# Patient Record
Sex: FEMALE | Race: WHITE | NOT HISPANIC OR LATINO | Employment: OTHER | URBAN - METROPOLITAN AREA
[De-identification: names, ages, dates, MRNs, and addresses within clinical notes are randomized per-mention and may not be internally consistent; named-entity substitution may affect disease eponyms.]

---

## 2018-10-22 ENCOUNTER — EVALUATION (OUTPATIENT)
Dept: PHYSICAL THERAPY | Facility: CLINIC | Age: 73
End: 2018-10-22
Payer: MEDICARE

## 2018-10-22 DIAGNOSIS — R26.9 GAIT ABNORMALITY: Primary | ICD-10-CM

## 2018-10-22 PROCEDURE — 97163 PT EVAL HIGH COMPLEX 45 MIN: CPT | Performed by: PHYSICAL THERAPIST

## 2018-10-22 PROCEDURE — G8979 MOBILITY GOAL STATUS: HCPCS | Performed by: PHYSICAL THERAPIST

## 2018-10-22 PROCEDURE — G8978 MOBILITY CURRENT STATUS: HCPCS | Performed by: PHYSICAL THERAPIST

## 2018-10-22 NOTE — LETTER
2018    Danica Hardy MD  [address] [place] [address]    Patient: Holly Hernandez   YOB: 1945   Date of Visit: 10/22/2018     Encounter Diagnosis     ICD-10-CM    1  Gait abnormality R26 9        Dear Dr Foy Body:    Please review the attached Plan of Care from [facility] recent visit  Please verify that you agree therapy should continue by signing the attached document and sending it back to our office  If you have any questions or concerns, please don't hesitate to call  Sincerely,    Celeste Saenz, PT      Referring Provider:      I certify that I have read the below Plan of Care and certify the need for these services furnished under this plan of treatment while under my care  Danica Hardy MD  [address] [place] [address]: [phone]          PT Evaluation     Today's date: 10/22/2018  Patient name: Holly Hernandez  :   MRN: [id number]  Referring provider: Jemal Duque MD  Dx:   Encounter Diagnosis     ICD-10-CM    1  Gait abnormality R26 9        Start Time: 1400  Stop Time: 1500   Total time in clinic (min): 60 minutes    Assessment  Impairments: abnormal gait, activity intolerance, impaired balance, impaired physical strength and lacks appropriate home exercise program    Assessment details: Pt is 76 y/o female presenting to skilled PT with gait and balance impairments  Pt reports having ongoing issues with gait and balance since C-spine surgery in   Pt has been to inpatient rehab as well as outpatient PT over the past few years and reports having "little hope" that things will improve but wants to give it another try  Pt presents with proximal hip weakness, impaired balance, and decreased endurance  Pt able to perform mCTSIB positions except for FTEC on foam (4 seconds) indicating impairments in vestibular component of balance  Pt scored below age norms for outcome measures   Based on TUG score (20 sec) and gait speed (0 59 m/s) pt is at increased risk for falls  Based on 5x STS (18 sec) and 6 MWT (560 ft) pt scores below age norms and indicates overall decreased endurance  Pt scored 36/56 on Tadeo indicating impaired balance and considered moderate fall risk  Based on above findings pt will benefit from skilled PT to improve strength and balance, reduce fall risk, and maximize function  (Pt to bring in list of complete PMH and medications upon next visit)  Understanding of Dx/Px/POC: good   Prognosis: good    Goals  Goals  STG 30 days    1  Patient will improve static balance with feet together eyes closed on foam surface to 30 seconds indicating reduction in fall risk  2  Patient will achieve 190 feet improvement with overall distance achieved of 750 feet with 6 minute walk test which is Minimal Detectable Change pre current research standards with endurance to demonstrate enhance functional capacity   3  Patient will display 2 3  second improvement with overall score of 17 seconds  with TUG test or lower with noted improvement being Minimal Detectable Change pre current research standards with fall risk     4  Patient will achieve 42/56 TADEO score with minimal improve by 6 points or more demonstrating Minimal Detectable change per current research standards for this objective test which assess fall risk  5  Patient will achieve   59 ft/sec improvement with score of  ft/sec with 10 Meter Walk test, demonstrating Minimal Detectable change per current research standards for this objective test which assess fall risk  6  Patient will perform  5 x sit to stand test to 15 seconds score indicating improvement with functional endurance      LT days   1  Patient will score low risk for falls with 3/4 fall risk measures  2  Patient will be able to ambulate 940 feet with [facility] during 6 minute walk test    3  Patient will be able to ambulate outdoors with [facility] without any loss of balance      Cut off score All date taken from [employer] Neuro Section or Rehab Measures    TADEO test: 46/56                                              5 x STS Test:  MDC: 6 points                                                  MDC: 2 3 seconds   age norms                                                                 Age Norms   61-76 year old = M: 54, F: 55                        62-78 year old: 11 4 seconds   66-77 year old = M 47,  F: 50                       71-76 year old: 12 6 seconds    80-80 year old = M46,   F: 53                       80-80 year old: 14 8 seconds     TUG test:                                                                     10 Meter Walk Test:  MDC: 4 14 seconds       MDC:  59 ft/sec  Cut off score for Falls                                                  Age Norms  > 13 5 seconds community dwelling adults                20-29; M: 4 56 ft/sec F: 4 62 ft/sec  > 32 2 Frail Elderly                                                     30-39: M 4 76 ft/sec  F: 4 68 ft/sec          40-49: M: 4 79 ft/sec  F: 4 62 ft/sec  6 Minute Walk Test      50-59: M: 4 76 ft/sec  F: 4 56 ft/sec  MDC: 190 feet       60-69: M: 4 56 ft/sec  F: 4 26 ft/sec  Age Norms       70-+    M: 4 36 ft/sec  F: 4 16 ft/sec  60-69:    M: 1876 F: 1765  70-79:    M: 1729 F: 1545  80-89 +: M: 0544 F; 1286     Plan  Patient would benefit from: PT eval  Planned modality interventions: biofeedback, thermotherapy: hydrocollator packs and cryotherapy  Planned therapy interventions: activity modification, balance, balance/weight bearing training, community reintegration, flexibility, functional ROM exercises, gait training, graded activity, graded exercise, home exercise program, graded motor, transfer training, therapeutic training, therapeutic exercise, therapeutic activities, stretching, strengthening, postural training, patient education, neuromuscular re-education and manual therapy  Frequency: 2x week  Duration in weeks: 12  Plan of Care beginning date: 10/22/2018  Plan of Care expiration date: 2019  Treatment plan discussed with: patient        Subjective Evaluation    History of Present Illness  Mechanism of injury: In  pt had cervical surgery (C2-C7), [facility] and continued with outpatient PT services  Pt had TKR's a few years ago; got C diff in the hospital and went to rehab after that also  Pt was using SPC but started using RW about 6 months ago  Pt's last fall was 2018 but she is fearful of falling and does not feel confident with her gait and balance  Pt presents to PT to improve her gait     Quality of life: good    Pain  Current pain ratin  At best pain ratin  At worst pain ratin    Social Support  Steps to enter house: yes (1)  Stairs in house: yes (FF stairs)   Lives in: multiple-level home  Lives with: spouse    Hand dominance: ambidextrous    Treatments  Previous treatment: physical therapy  Patient Goals  Patient goals for therapy: increased strength, independence with ADLs/IADLs, return to sport/leisure activities and improved balance  Patient goal: be able to improve her walking and walk with SPC         Objective     Strength/Myotome Testing     Left Hip   Planes of Motion   Flexion: 3+  Abduction: 3+  Adduction: 3+    Right Hip   Planes of Motion   Flexion: 3+  Abduction: 3+  Adduction: 3+    Left Knee   Flexion: 4-  Extension: 4-    Right Knee   Flexion: 4-  Extension: 4-    Left Ankle/Foot   Dorsiflexion: 4-  Plantar flexion: 4-    Right Ankle/Foot   Dorsiflexion: 4-  Plantar flexion: 4-    Ambulation     Comments   TUG= 20 sec with RW  Gait speed= 10m / 17 sec = 0 59 m/sec  6 MWT= 560 ft with RW      Functional Assessment     Comments  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  Coordination intact- finger to nose, heel to shin     5x STS= 18 seconds    Contreras= 36/56     Feet Together Eyes Open= 30 seconds  Feet Together Eyes Closed= 30 seconds  Feet Together Eyes Open (Foam)= 30 seconds  Feet Together Eyes Closed (Foam)= 4 seconds    PT/OT Neuro Exam  Neurologic Exam     Gait, Coordination, and Reflexes TUG= 20 sec with RW  Gait speed= 10m / 17 sec = 0 59 m/sec  6 MWT= 560 ft with RW         Precautions: falls    Daily Treatment Diary     Manual                                                                                   Exercise Diary                                                                                                                                                                                                                                                                                      Modalities                                                               Flowsheet Rows      Most Recent Value   PT/OT G-Codes   Current Score  50   Projected Score  66   FOTO information reviewed  Yes   Assessment Type  Evaluation   G code set  Mobility: Walking & Moving Around   Mobility: Walking and Moving Around Current Status ()  CK   Mobility: Walking and Moving Around Goal Status ()  CI

## 2018-10-22 NOTE — PROGRESS NOTES
PT Evaluation     Today's date: 10/22/2018  Patient name: Magui Avila  :   MRN: [id number]  Referring provider: Sanjay Toro MD  Dx:   Encounter Diagnosis     ICD-10-CM    1  Gait abnormality R26 9        Start Time: 1400  Stop Time: 1500   Total time in clinic (min): 60 minutes    Assessment  Impairments: abnormal gait, activity intolerance, impaired balance, impaired physical strength and lacks appropriate home exercise program    Assessment details: Pt is 74 y/o female presenting to skilled PT with gait and balance impairments  Pt reports having ongoing issues with gait and balance since C-spine surgery in   Pt has been to inpatient rehab as well as outpatient PT over the past few years and reports having "little hope" that things will improve but wants to give it another try  Pt presents with proximal hip weakness, impaired balance, and decreased endurance  Pt able to perform mCTSIB positions except for FTEC on foam (4 seconds) indicating impairments in vestibular component of balance  Pt scored below age norms for outcome measures  Based on TUG score (20 sec) and gait speed (0 59 m/s) pt is at increased risk for falls  Based on 5x STS (18 sec) and 6 MWT (560 ft) pt scores below age norms and indicates overall decreased endurance  Pt scored 36/56 on Contreras indicating impaired balance and considered moderate fall risk  Based on above findings pt will benefit from skilled PT to improve strength and balance, reduce fall risk, and maximize function  (Pt to bring in list of complete PMH and medications upon next visit)  Understanding of Dx/Px/POC: good   Prognosis: good    Goals  Goals  STG 30 days    1  Patient will improve static balance with feet together eyes closed on foam surface to 30 seconds indicating reduction in fall risk     2  Patient will achieve 190 feet improvement with overall distance achieved of 750 feet with 6 minute walk test which is Minimal Detectable Change pre current research standards with endurance to demonstrate enhance functional capacity   3  Patient will display 2 3  second improvement with overall score of 17 seconds  with TUG test or lower with noted improvement being Minimal Detectable Change pre current research standards with fall risk     4  Patient will achieve 42/56 TADEO score with minimal improve by 6 points or more demonstrating Minimal Detectable change per current research standards for this objective test which assess fall risk  5  Patient will achieve   59 ft/sec improvement with score of  ft/sec with 10 Meter Walk test, demonstrating Minimal Detectable change per current research standards for this objective test which assess fall risk  6  Patient will perform  5 x sit to stand test to 15 seconds score indicating improvement with functional endurance      LT days   1  Patient will score low risk for falls with 3/4 fall risk measures  2  Patient will be able to ambulate 940 feet with [facility] during 6 minute walk test    3  Patient will be able to ambulate outdoors with [facility] without any loss of balance      Cut off score   All date taken from [employer] Neuro Section or Rehab Measures    TADEO test: 46/56                                              5 x STS Test:  MDC: 6 points                                                  MDC: 2 3 seconds   age norms                                                                 Age Norms   61-76 year old = M: 54, F: 55                        62-78 year old: 11 4 seconds   66-77 year old = M 47,  F: 50                       71-76 year old: 12 6 seconds    80-80 year old = M46,   F: 53                       80-80 year old: 14 8 seconds     TUG test:                                                                     10 Meter Walk Test:  MDC: 4 14 seconds       MDC:  59 ft/sec  Cut off score for Falls                                                  Age Norms  > 13 5 seconds community dwelling adults                20-29; M: 4 56 ft/sec F: 4 62 ft/sec  > 32 2 Frail Elderly                                                     30-39: M 4 76 ft/sec  F: 4 68 ft/sec          40-49: M: 4 79 ft/sec  F: 4 62 ft/sec  6 Minute Walk Test      50-59: M: 4 76 ft/sec  F: 4 56 ft/sec  MDC: 190 feet       60-69: M: 4 56 ft/sec  F: 4 26 ft/sec  Age Norms       70-+    M: 4 36 ft/sec  F: 4 16 ft/sec  60-69:    M: 1876 F: 3623  39-40:    M: 1729 F: 3519  12-28 +: M: 1044 F; 1286     Plan  Patient would benefit from: PT eval  Planned modality interventions: biofeedback, thermotherapy: hydrocollator packs and cryotherapy  Planned therapy interventions: activity modification, balance, balance/weight bearing training, community reintegration, flexibility, functional ROM exercises, gait training, graded activity, graded exercise, home exercise program, graded motor, transfer training, therapeutic training, therapeutic exercise, therapeutic activities, stretching, strengthening, postural training, patient education, neuromuscular re-education and manual therapy  Frequency: 2x week  Duration in weeks: 12  Plan of Care beginning date: 10/22/2018  Plan of Care expiration date: 2019  Treatment plan discussed with: patient        Subjective Evaluation    History of Present Illness  Mechanism of injury: In  pt had cervical surgery (C2-C7), went to [facility] for rehabilitation for 3 weeks and continued with outpatient PT services  Pt had TKR's a few years ago; got C diff in the hospital and went to rehab after that also  Pt was using SPC but started using RW about 6 months ago  Pt's last fall was 2018 but she is fearful of falling and does not feel confident with her gait and balance  Pt presents to PT to improve her gait     Quality of life: good    Pain  Current pain ratin  At best pain ratin  At worst pain ratin    Social Support  Steps to enter house: yes (1)  Stairs in house: yes (FF stairs)   Lives in: multiple-level home  Lives with: spouse    Hand dominance: ambidextrous    Treatments  Previous treatment: physical therapy  Patient Goals  Patient goals for therapy: increased strength, independence with ADLs/IADLs, return to sport/leisure activities and improved balance  Patient goal: be able to improve her walking and walk with [facility]         Objective     Strength/Myotome Testing     Left Hip   Planes of Motion   Flexion: 3+  Abduction: 3+  Adduction: 3+    Right Hip   Planes of Motion   Flexion: 3+  Abduction: 3+  Adduction: 3+    Left Knee   Flexion: 4-  Extension: 4-    Right Knee   Flexion: 4-  Extension: 4-    Left Ankle/Foot   Dorsiflexion: 4-  Plantar flexion: 4-    Right Ankle/Foot   Dorsiflexion: 4-  Plantar flexion: 4-    Ambulation     Comments   TUG= 20 sec with RW  Gait speed= 10m / 17 sec = 0 59 m/sec  6 MWT= 560 ft with RW      Functional Assessment     Comments  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  Coordination intact- finger to nose, heel to shin     5x STS= 18 seconds    Contreras= 36/56     Feet Together Eyes Open= 30 seconds  Feet Together Eyes Closed= 30 seconds  Feet Together Eyes Open (Foam)= 30 seconds  Feet Together Eyes Closed (Foam)= 4 seconds    PT/OT Neuro Exam  Neurologic Exam     Gait, Coordination, and Reflexes TUG= 20 sec with RW  Gait speed= 10m / 17 sec = 0 59 m/sec  6 MWT= 560 ft with RW         Precautions: falls    Daily Treatment Diary     Manual                                                                                   Exercise Diary                                                                                                                                                                                                                                                                                      Modalities                                                               Flowsheet Rows      Most Recent Value   PT/OT G-Codes   Current Score  50   Projected Score  66   FOTO information reviewed  Yes   Assessment Type  Evaluation   G code set  Mobility: Walking & Moving Around   Mobility: Walking and Moving Around Current Status ()  CK   Mobility: Walking and Moving Around Goal Status ()  CI

## 2018-10-23 ENCOUNTER — TRANSCRIBE ORDERS (OUTPATIENT)
Dept: PHYSICAL THERAPY | Facility: CLINIC | Age: 73
End: 2018-10-23

## 2018-10-23 DIAGNOSIS — R26.9 GAIT ABNORMALITY: Primary | ICD-10-CM

## 2018-10-29 ENCOUNTER — OFFICE VISIT (OUTPATIENT)
Dept: PHYSICAL THERAPY | Facility: CLINIC | Age: 73
End: 2018-10-29
Payer: MEDICARE

## 2018-10-29 DIAGNOSIS — R26.9 GAIT ABNORMALITY: Primary | ICD-10-CM

## 2018-10-29 PROCEDURE — 97112 NEUROMUSCULAR REEDUCATION: CPT

## 2018-10-29 PROCEDURE — 97530 THERAPEUTIC ACTIVITIES: CPT

## 2018-10-29 PROCEDURE — 97110 THERAPEUTIC EXERCISES: CPT

## 2018-10-29 NOTE — PROGRESS NOTES
Daily Note     Today's date: 10/29/2018  Patient name: Tricia Raines  :   MRN: [id number]  Referring provider: Barrett Motta MD  Dx:   Encounter Diagnosis     ICD-10-CM    1  Gait abnormality R26 9        Start Time:   Stop Time:   Total time in clinic (min): 45 minutes    Subjective: Patient reported that she is "feeling good today", stated that she had a 7/10 knee pain today  Presented with hyperextension stability brace today  Objective: See treatment diary below:  Precautions: Fall Risk, No past medical history on file  Daily Treatment Diary     Manual                                                                                   Exercise Diary  10/29            Mini-Squats with Knee flexion hold 20 reps, 3 seconds asecdning and 3 seconds descending            Side Stepping with mini-squat 2 UE, 10 cycles             Hip Flexion with mini-Squat 20 reps, 3 seconds holds            Hip Extension with mini-squat 20 reps, 3 seconds holds            Hip Abduction 20 reps, 3 seconds holds            Seated Adduction ball squeezes 20 reps, 3 second holds            Hurdles- Forwards, Laterally, Backwards 2 UE holds, 3 consecutive hurdles, 10 cycles each direction            Sit to stands No UE, 2 airex pads on seat, 20 reps, ball between knees            FTEO on Foam, no UE 30 seconds, 2 sets                                                                                                                                                               Modalities                                                         Assessment: Patient tolerated session well today, able to initiate her POC well today  Patient noted difficulties with maintaining her ability to keep her L knee from hyper-extending today during treatment, patient asked to engage quads today during activities to improve her quadriceps strength and reduce hyper-extension   Patient shows decreased L LE weight shift today, indicated with R foot underneath seat due to decreased L LE strength  Patient improving with LE strength during session with 2 airex pads on seat  Patient educated on brace management to prevent hyper-extension at home, told to not wear brace during sleep  Patient requires skilled PT to improve fall risk and maximize function  Plan: Continue per plan of care  Progress treatment as tolerated

## 2018-10-31 ENCOUNTER — OFFICE VISIT (OUTPATIENT)
Dept: PHYSICAL THERAPY | Facility: CLINIC | Age: 73
End: 2018-10-31
Payer: MEDICARE

## 2018-10-31 DIAGNOSIS — R26.9 GAIT ABNORMALITY: Primary | ICD-10-CM

## 2018-10-31 PROCEDURE — 97110 THERAPEUTIC EXERCISES: CPT

## 2018-10-31 NOTE — PROGRESS NOTES
Daily Note     Today's date: 10/31/2018  Patient name: Magui Avila  :   MRN: [id number]  Referring provider: Sanjay Toro MD  Dx:   Encounter Diagnosis     ICD-10-CM    1  Gait abnormality R26 9        Start Time:   Stop Time:   Total time in clinic (min): 46 minutes    Subjective: Patient reported that she is "feeling good today", stated that she had a 7/10 knee pain today  Presented with hyperextension stability brace today  Treating therapist suggested speaking to a prosthetist for a brace to prevent her knee hyperextension  Objective: See treatment diary below:  Precautions: Fall Risk, No past medical history on file        Daily Treatment Diary     Manual                                                                                   Exercise Diary  10/29 10/31           Mini-Squats with Knee flexion hold 20 reps, 3 seconds asecdning and 3 seconds descending 20 reps, 3 seconds asecdning and 3 seconds descending           Side Stepping with mini-squat 2 UE, 10 cycles  2 UE, 10 cycles            Hip Flexion with mini-Squat 20 reps, 3 seconds holds 20 reps, 3 seconds holds           Hip Extension with mini-squat 20 reps, 3 seconds holds 20 reps, 3 seconds holds           Hip Abduction 20 reps, 3 seconds holds 20 reps, 3 seconds holds           Seated Adduction ball squeezes 20 reps, 3 second holds 25 reps, 3 second holds           Hurdles- Forwards, Laterally, Backwards 2 UE holds, 3 consecutive hurdles, 10 cycles each direction 2 UE holds, 3 consecutive hurdles, 10 cycles each direction           Sit to stands No UE, 2 airex pads on seat, 20 reps, ball between knees No UE, 2 airex pads on seat, 20 reps, ball between knees           FTEO on Foam, no UE 30 seconds, 2 sets 30 seconds, 2 sets           Standing Hamstring Curls  20 reps, 3 second holds bilaterally Modalities                                                         Assessment: Patient tolerated session well today, able to progress POC well today  Addition of hamstring curls today to provide joint stability in the posterior region of the L knee joint  Patient improving in awareness with her abilities to improve her squatting to engage her quadriceps bilaterally  Patient improving with hurdles today as well, continues to show circumduction over the hurdles in all directions due to instability as well as weakness in the bilateral knees  Inquired on prosthetist preventing L LE hyper-extension  Patient requires skilled PT to improve fall risk and maximize function  Plan: Continue per plan of care  Progress treatment as tolerated

## 2018-11-05 ENCOUNTER — OFFICE VISIT (OUTPATIENT)
Dept: PHYSICAL THERAPY | Facility: CLINIC | Age: 73
End: 2018-11-05
Payer: MEDICARE

## 2018-11-05 DIAGNOSIS — R26.9 GAIT ABNORMALITY: Primary | ICD-10-CM

## 2018-11-05 PROCEDURE — 97110 THERAPEUTIC EXERCISES: CPT

## 2018-11-05 NOTE — PROGRESS NOTES
Daily Note     Today's date: 2018  Patient name: Rosa Cisneros  :   MRN: [id number]  Referring provider: Torrie Richter MD  Dx:   Encounter Diagnosis     ICD-10-CM    1  Gait abnormality R26 9        Start Time: 110  Stop Time:   Total time in clinic (min): 40 minutes    Subjective: Patient wants to be measured for possibility of knee brace to prevent her knees from extending too much  Objective: See treatment diary below    Precautions: Fall Risk, No past medical history on file        Daily Treatment Diary     Manual                                                                                   Exercise Diary  10/29 10/31 11/5          Mini-Squats with Knee flexion hold 20 reps, 3 seconds asecdning and 3 seconds descending 20 reps, 3 seconds asecdning and 3 seconds descending 20 reps, 3 seconds asecdning and 3 seconds descending          Side Stepping with mini-squat 2 UE, 10 cycles  2 UE, 10 cycles  2 UE, 10 cycles          Hip Flexion with mini-Squat 20 reps, 3 seconds holds 20 reps, 3 seconds holds 20 reps, 3 seconds holds          Hip Extension with mini-squat 20 reps, 3 seconds holds 20 reps, 3 seconds holds 20 reps, 3 seconds holds          Hip Abduction 20 reps, 3 seconds holds 20 reps, 3 seconds holds 20 reps, 3 seconds holds          Seated Adduction ball squeezes 20 reps, 3 second holds 25 reps, 3 second holds           Hurdles- Forwards, Laterally, Backwards 2 UE holds, 3 consecutive hurdles, 10 cycles each direction 2 UE holds, 3 consecutive hurdles, 10 cycles each direction 2 UE holds, 3 consecutive hurdles, 10 cycles each direction          Sit to stands No UE, 2 airex pads on seat, 20 reps, ball between knees No UE, 2 airex pads on seat, 20 reps, ball between knees No UE, 2 airex pads on seat, 20 reps, ball between knees          FTEO on Foam, no UE 30 seconds, 2 sets 30 seconds, 2 sets 30 seconds, 2 sets          Standing Hamstring Curls  20 reps, 3 second holds bilaterally                                                                                                                                                  Modalities                                                           Assessment: Tolerated treatment well  She would benefit from being measured for BL knee braces to prevent BL knee hyperextension while standing and with ambulatoin  Patient would benefit from continued PT in order to continue strengthening BL LEs and address balance impairments  Plan: Continue per plan of care

## 2018-11-07 ENCOUNTER — OFFICE VISIT (OUTPATIENT)
Dept: PHYSICAL THERAPY | Facility: CLINIC | Age: 73
End: 2018-11-07
Payer: MEDICARE

## 2018-11-07 DIAGNOSIS — R26.9 GAIT ABNORMALITY: Primary | ICD-10-CM

## 2018-11-07 PROCEDURE — 97112 NEUROMUSCULAR REEDUCATION: CPT

## 2018-11-07 PROCEDURE — 97110 THERAPEUTIC EXERCISES: CPT

## 2018-11-07 NOTE — PROGRESS NOTES
Daily Note     Today's date: 2018  Patient name: Albaro Eduardo  :   MRN: [id number]  Referring provider: Kalpana Chavarria MD  Dx:   Encounter Diagnosis     ICD-10-CM    1  Gait abnormality R26 9        Start Time: 1300  Stop Time: 1340  Total time in clinic (min): 40 minutes    Subjective: Patient reports that her knees continue to feel unstable and would like to be measured for braces, which PT has called to schedule an appointment to have her measured for knee braces  Objective: See treatment diary below    Precautions: Fall Risk, No past medical history on file        Daily Treatment Diary     Manual                                                                                   Exercise Diary  10/29 10/31 11/5 11/7         Mini-Squats with Knee flexion hold 20 reps, 3 seconds asecdning and 3 seconds descending 20 reps, 3 seconds asecdning and 3 seconds descending 20 reps, 3 seconds asecdning and 3 seconds descending 20 reps, 3 seconds asecdning and 3 seconds descending         Side Stepping with mini-squat 2 UE, 10 cycles  2 UE, 10 cycles  2 UE, 10 cycles 2 UE, 10 cycles         Hip Flexion with mini-Squat 20 reps, 3 seconds holds 20 reps, 3 seconds holds 20 reps, 3 seconds holds 20 reps, 3 seconds holds  1# ankle weight         Hip Extension with mini-squat 20 reps, 3 seconds holds 20 reps, 3 seconds holds 20 reps, 3 seconds holds 20 reps, 3 seconds holds  1#         Hip Abduction 20 reps, 3 seconds holds 20 reps, 3 seconds holds 20 reps, 3 seconds holds 20 reps, 3 seconds holds  1# ankle weight         Seated Adduction ball squeezes 20 reps, 3 second holds 25 reps, 3 second holds  25 reps, 3 second holds         Hurdles- Forwards, Laterally, Backwards 2 UE holds, 3 consecutive hurdles, 10 cycles each direction 2 UE holds, 3 consecutive hurdles, 10 cycles each direction 2 UE holds, 3 consecutive hurdles, 10 cycles each direction          Sit to stands No UE, 2 airex pads on seat, 20 reps, ball between knees No UE, 2 airex pads on seat, 20 reps, ball between knees No UE, 2 airex pads on seat, 20 reps, ball between knees No UE, 2 airex pads on seat, 20 reps         FTEO on Foam, no UE 30 seconds, 2 sets 30 seconds, 2 sets 30 seconds, 2 sets 30 seconds, 2         Standing Hamstring Curls  20 reps, 3 second holds bilaterally   20 reps, 3 second holds bilaterally   1#         HR's/TR's    1#  2 x 10                                                                                                                                  Modalities                                                           Assessment: Tolerated treatment well   No complaints of knee pain but required frequent PT cueing verbally to prevent BL knee hyperextension through out session  Patient would benefit from continued PT      Plan: Continue per plan of care  Set up time for knee brace fitting

## 2018-11-12 ENCOUNTER — OFFICE VISIT (OUTPATIENT)
Dept: PHYSICAL THERAPY | Facility: CLINIC | Age: 73
End: 2018-11-12
Payer: MEDICARE

## 2018-11-12 DIAGNOSIS — R26.9 GAIT ABNORMALITY: Primary | ICD-10-CM

## 2018-11-12 PROCEDURE — 97112 NEUROMUSCULAR REEDUCATION: CPT

## 2018-11-12 PROCEDURE — 97110 THERAPEUTIC EXERCISES: CPT

## 2018-11-12 NOTE — PROGRESS NOTES
Daily Note     Today's date: 2018  Patient name: eGni Dockery  :   MRN: [id number]  Referring provider: Melanie Boogie MD  Dx:   Encounter Diagnosis     ICD-10-CM    1  Gait abnormality R26 9        Start Time:   Stop Time:   Total time in clinic (min): 45 minutes    Subjective: Patient reports no changes since last visit but would like to move onto more challenging exercises  Objective: See treatment diary below    Precautions: Fall Risk, No past medical history on file        Daily Treatment Diary     Manual                                                                                   Exercise Diary  10/29 10/31 11/5 11/7 11/12        Mini-Squats with Knee flexion hold 20 reps, 3 seconds asecdning and 3 seconds descending 20 reps, 3 seconds asecdning and 3 seconds descending 20 reps, 3 seconds asecdning and 3 seconds descending 20 reps, 3 seconds asecdning and 3 seconds descending 20 reps, 3 seconds asecdning and 3 seconds descending        Side Stepping with mini-squat 2 UE, 10 cycles  2 UE, 10 cycles  2 UE, 10 cycles 2 UE, 10 cycles         Hip Flexion with mini-Squat 20 reps, 3 seconds holds 20 reps, 3 seconds holds 20 reps, 3 seconds holds 20 reps, 3 seconds holds  1# ankle weight         Hip Extension with mini-squat 20 reps, 3 seconds holds 20 reps, 3 seconds holds 20 reps, 3 seconds holds 20 reps, 3 seconds holds  1#         Hip Abduction 20 reps, 3 seconds holds 20 reps, 3 seconds holds 20 reps, 3 seconds holds 20 reps, 3 seconds holds  1# ankle weight         Seated Adduction ball squeezes 20 reps, 3 second holds 25 reps, 3 second holds  25 reps, 3 second holds         Hurdles- Forwards, Laterally, Backwards 2 UE holds, 3 consecutive hurdles, 10 cycles each direction 2 UE holds, 3 consecutive hurdles, 10 cycles each direction 2 UE holds, 3 consecutive hurdles, 10 cycles each direction          Sit to stands No UE, 2 airex pads on seat, 20 reps, ball between knees No UE, 2 airex pads on seat, 20 reps, ball between knees No UE, 2 airex pads on seat, 20 reps, ball between knees No UE, 2 airex pads on seat, 20 reps No UE, 2 airex pads on seat, 20 reps        FTEO on Foam, no UE 30 seconds, 2 sets 30 seconds, 2 sets 30 seconds, 2 sets 30 seconds, 2         Standing Hamstring Curls  20 reps, 3 second holds bilaterally   20 reps, 3 second holds bilaterally   1#         HR's/TR's    1#  2 x 10         BOSU Lunges     2 x 10    3" hold BL UE support        Toe Taps      2 x 10   8" block        Heel Toe Walking     6 cycles forward and backward         Step Ups             Tandem Stances     3 x 30" Firm BL UE tactile cues                                                                Modalities                                                         Assessment: Tolerated treatment well  She tolerated exercise progressions well she was able to complete all reps and sets with minimal signs of fatigue until end of sets  Required frequent PT verbal cues to prevent knee hyperextension BL during exercises  Patient would benefit from continued PT in order to strengthen BL LEs and prevent knee hyperextension with exercises  Plan: Continue per plan of care

## 2018-11-15 ENCOUNTER — OFFICE VISIT (OUTPATIENT)
Dept: PHYSICAL THERAPY | Facility: CLINIC | Age: 73
End: 2018-11-15
Payer: MEDICARE

## 2018-11-15 DIAGNOSIS — R26.9 GAIT ABNORMALITY: Primary | ICD-10-CM

## 2018-11-15 PROCEDURE — 97112 NEUROMUSCULAR REEDUCATION: CPT | Performed by: PHYSICAL THERAPIST

## 2018-11-15 PROCEDURE — 97110 THERAPEUTIC EXERCISES: CPT | Performed by: PHYSICAL THERAPIST

## 2018-11-15 NOTE — PROGRESS NOTES
Daily Note     Today's date: 11/15/2018  Patient name: Denisse Lima  :   MRN: [id number]  Referring provider: Edison Frederick MD  Dx:   Encounter Diagnosis     ICD-10-CM    1  Gait abnormality R26 9        Start Time: 1200  Stop Time:   Total time in clinic (min): 48 minutes    Subjective: Pt reports L knee pain due to excessive hyperextension; orthotist coming  to assess for swedish knee cage brace  Reports 7/10 knee pain with standing exercises WBing through LLE (medial/posterior knee pain); 0/10 when sitting down resting  Objective: See treatment diary below    Precautions: Fall Risk, No past medical history on file        Daily Treatment Diary     Manual                                                                                   Exercise Diary  10/29 10/31 11/5 11/7 11/12 11/15       Mini-Squats with Knee flexion hold 20 reps, 3 seconds asecdning and 3 seconds descending 20 reps, 3 seconds asecdning and 3 seconds descending 20 reps, 3 seconds asecdning and 3 seconds descending 20 reps, 3 seconds asecdning and 3 seconds descending 20 reps, 3 seconds asecdning and 3 seconds descending 20 reps, 3 seconds asecdning and 3 seconds descending       Side Stepping with mini-squat 2 UE, 10 cycles  2 UE, 10 cycles  2 UE, 10 cycles 2 UE, 10 cycles  Side-stepping with green TB       Hip Flexion with mini-Squat 20 reps, 3 seconds holds 20 reps, 3 seconds holds 20 reps, 3 seconds holds 20 reps, 3 seconds holds  1# ankle weight         Hip Extension with mini-squat 20 reps, 3 seconds holds 20 reps, 3 seconds holds 20 reps, 3 seconds holds 20 reps, 3 seconds holds  1#         Hip Abduction 20 reps, 3 seconds holds 20 reps, 3 seconds holds 20 reps, 3 seconds holds 20 reps, 3 seconds holds  1# ankle weight  20 reps, 3 seconds holds  2# ankle weight       Seated Adduction ball squeezes 20 reps, 3 second holds 25 reps, 3 second holds  25 reps, 3 second holds         Hurdles- Forwards, Laterally, Backwards 2 UE holds, 3 consecutive hurdles, 10 cycles each direction 2 UE holds, 3 consecutive hurdles, 10 cycles each direction 2 UE holds, 3 consecutive hurdles, 10 cycles each direction          Sit to stands No UE, 2 airex pads on seat, 20 reps, ball between knees No UE, 2 airex pads on seat, 20 reps, ball between knees No UE, 2 airex pads on seat, 20 reps, ball between knees No UE, 2 airex pads on seat, 20 reps No UE, 2 airex pads on seat, 20 reps No UE, 2 airex pads on seat, 20 reps       FTEO on Foam, no UE 30 seconds, 2 sets 30 seconds, 2 sets 30 seconds, 2 sets 30 seconds, 2         Standing Hamstring Curls  20 reps, 3 second holds bilaterally   20 reps, 3 second holds bilaterally   1#  20 reps     2# ankle weights       HR's/TR's    1#  2 x 10         BOSU Lunges     2 x 10    3" hold BL UE support 2 x 10    3" hold BL UE support       Toe Taps      2 x 10   8" block        Heel Toe Walking     6 cycles forward and backward         Step Ups      6" step  BUE's  20 reps       Tandem Stances     3 x 30" Firm BL UE tactile cues        TKE's      20 reps  Green TB       SLR      2# ankle weights    2x10                                     Modalities                                                       11/15/18 given HEP for SLR and seated hip abd with TB    Assessment:   Pt tolerated treatment session well  Pt continues to present with L knee hyperextension and dec L knee stability  Pt c/o medial L knee pain and presents with increased valgus  Focused on hip abduction strengthening exercises as well as maintaining L knee in neutral position during standing TE with cues from therapist  Pt given HEP today  Patient would benefit from continued PT in order to strengthen BLE's, improve balance, and maximize function  Plan: Continue per plan of care

## 2018-11-19 ENCOUNTER — OFFICE VISIT (OUTPATIENT)
Dept: PHYSICAL THERAPY | Facility: CLINIC | Age: 73
End: 2018-11-19
Payer: MEDICARE

## 2018-11-19 DIAGNOSIS — R26.9 GAIT ABNORMALITY: Primary | ICD-10-CM

## 2018-11-19 PROCEDURE — 97110 THERAPEUTIC EXERCISES: CPT | Performed by: PHYSICAL THERAPIST

## 2018-11-19 PROCEDURE — 97112 NEUROMUSCULAR REEDUCATION: CPT | Performed by: PHYSICAL THERAPIST

## 2018-11-19 NOTE — PROGRESS NOTES
Daily Note     Today's date: 2018  Patient name: Rosa Cisneros  :   MRN: [id number]  Referring provider: Torrie Richter MD  Dx:   Encounter Diagnosis     ICD-10-CM    1  Gait abnormality R26 9        Start Time: 1300  Stop Time: 8371  Total time in clinic (min): 45 minutes    Subjective: Pt reports she did a lot of cleaning over the weekend and had increased R knee pain afterwards  However reports today it "doesn't feel too bad " Mario from [address] [place] [address] is coming Wednesday to assess for swedish knee cage brace  Objective: See treatment diary below    Precautions: Fall Risk, No past medical history on file        Daily Treatment Diary     Manual                                                                                   Exercise Diary  10/29 10/31 11/5 11/7 11/12 11/15 11/19      Mini-Squats with Knee flexion hold 20 reps, 3 seconds asecdning and 3 seconds descending 20 reps, 3 seconds asecdning and 3 seconds descending 20 reps, 3 seconds asecdning and 3 seconds descending 20 reps, 3 seconds asecdning and 3 seconds descending 20 reps, 3 seconds asecdning and 3 seconds descending 20 reps, 3 seconds asecdning and 3 seconds descending 20 reps, 3 seconds asecdning and 3 seconds descending     With green TB       Side Stepping with mini-squat 2 UE, 10 cycles  2 UE, 10 cycles  2 UE, 10 cycles 2 UE, 10 cycles  Side-stepping with green TB Side-steping with green TB      Hip Flexion with mini-Squat 20 reps, 3 seconds holds 20 reps, 3 seconds holds 20 reps, 3 seconds holds 20 reps, 3 seconds holds  1# ankle weight         Hip Extension with mini-squat 20 reps, 3 seconds holds 20 reps, 3 seconds holds 20 reps, 3 seconds holds 20 reps, 3 seconds holds  1#         Hip Abduction 20 reps, 3 seconds holds 20 reps, 3 seconds holds 20 reps, 3 seconds holds 20 reps, 3 seconds holds  1# ankle weight  20 reps, 3 seconds holds  2# ankle weight 20 reps, 3 second holds  2# ankle weight      Seated Adduction ball squeezes 20 reps, 3 second holds 25 reps, 3 second holds  25 reps, 3 second holds         Hurdles- Forwards, Laterally, Backwards 2 UE holds, 3 consecutive hurdles, 10 cycles each direction 2 UE holds, 3 consecutive hurdles, 10 cycles each direction 2 UE holds, 3 consecutive hurdles, 10 cycles each direction          Sit to stands No UE, 2 airex pads on seat, 20 reps, ball between knees No UE, 2 airex pads on seat, 20 reps, ball between knees No UE, 2 airex pads on seat, 20 reps, ball between knees No UE, 2 airex pads on seat, 20 reps No UE, 2 airex pads on seat, 20 reps No UE, 2 airex pads on seat, 20 reps No UE, 2 airex pads on seat, 20 reps    Green TB       FTEO on Foam, no UE 30 seconds, 2 sets 30 seconds, 2 sets 30 seconds, 2 sets 30 seconds, 2         Standing Hamstring Curls  20 reps, 3 second holds bilaterally   20 reps, 3 second holds bilaterally   1#  20 reps     2# ankle weights 20 reps     2# ankle weights      HR's/TR's    1#  2 x 10         BOSU Lunges     2 x 10    3" hold BL UE support 2 x 10    3" hold BL UE support 2 x 10    3" hold  BUE support    therapist maintaining L knee in neutral      Toe Taps      2 x 10   8" block        Heel Toe Walking     6 cycles forward and backward         Step Ups      6" step  BUE's  20 reps       Tandem Stances     3 x 30" Firm BL UE tactile cues        TKE's      20 reps  Green TB 20 reps  GreenTB      SLR      2# ankle weights    2x10 2# ankle weights    2x10                                    Modalities                                                       11/15/18 given HEP for SLR and seated hip abd with TB    Assessment:   Pt tolerated treatment session well  Pt continues to present with L knee hyperextension and dec L knee stability  Added green TB around thighs for mini squats and sit>stands for further hip abduction strengthening  Provided tc's to keep L knee in neutral during any SLS and pt reported increased comfort, decreased pain   Patient would benefit from continued PT in order to strengthen BLE's, improve balance, and maximize function  Plan: Continue per plan of care

## 2018-11-21 ENCOUNTER — OFFICE VISIT (OUTPATIENT)
Dept: PHYSICAL THERAPY | Facility: CLINIC | Age: 73
End: 2018-11-21
Payer: MEDICARE

## 2018-11-21 DIAGNOSIS — R26.9 GAIT ABNORMALITY: Primary | ICD-10-CM

## 2018-11-21 PROCEDURE — G8979 MOBILITY GOAL STATUS: HCPCS

## 2018-11-21 PROCEDURE — 97112 NEUROMUSCULAR REEDUCATION: CPT

## 2018-11-21 PROCEDURE — G8978 MOBILITY CURRENT STATUS: HCPCS

## 2018-11-21 NOTE — PROGRESS NOTES
PT Re-Evaluation     Today's date: 2018  Patient name: Maryellen Prader  : 2097  MRN: [id number]  Referring provider: Dede Santos MD  Dx:   Encounter Diagnosis     ICD-10-CM    1  Gait abnormality R26 9        Start Time: 1300  Stop Time: 4104   Total time in clinic (min): 45 minutes    Assessment  Assessment details: Pt is 76 y/o female presenting to skilled PT with gait and balance impairments  Pt reports having ongoing issues with gait and balance since C-spine surgery in   Pt has been to inpatient rehab as well as outpatient PT over the past few years and reports having "little hope" that things will improve but wants to give it another try  Pt presents with proximal hip weakness, impaired balance, and decreased endurance  Pt able to perform mCTSIB positions except for FTEC on foam (4 seconds) indicating impairments in vestibular component of balance  Pt scored below age norms for outcome measures  Based on TUG score (20 sec) and gait speed (0 59 m/s) pt is at increased risk for falls  Based on 5x STS (18 sec) and 6 MWT (560 ft) pt scores below age norms and indicates overall decreased endurance  Pt scored 36/56 on Contreras indicating impaired balance and considered moderate fall risk  Based on above findings pt will benefit from skilled PT to improve strength and balance, reduce fall risk, and maximize function  18  Since beginning PT patient continues to have L knee pain which she feels limits her balancing abilities  Improvements made with strength testing, but scores still indicating significant weakness present in BL LEs  Improvements made in 5x STS indicating slight reduction in falls risk  Improvement in 6 MW test by 140 ft indicating improvements in strength and endurance  No changes made with TUG time and only 2 second improvement with FTEC on foam, indicating she is still at high falls risk   Patient would benefit from skilled PT in order to reduce L knee pain, improve BL LE strength, improve gait and balance abnormalities, and have patient fitted for knee braces so she can ambulate and stand safely at home and in the community, complete ADLs and household chores requiring standing and walking and to exercise at the gym to maintain her health safely and without risk of falls  (Pt to bring in list of complete PMH and medications upon next visit)    11/21/18  Pt is 76 y/o female presenting to skilled PT with gait and balance impairments  Pt reports having ongoing issues with gait and balance since C-spine surgery in 2011  Pt has been to inpatient rehab as well as outpatient PT over the past few years and reports having "little hope" that things will improve but wants to give it another try  Pt presents with proximal hip weakness, impaired balance, and decreased endurance  Pt able to perform mCTSIB positions except for FTEC on foam (4 seconds) indicating impairments in vestibular component of balance  Pt scored below age norms for outcome measures  Based on TUG score (20 sec) and gait speed (0 59 m/s) pt is at increased risk for falls  Based on 5x STS (18 sec) and 6 MWT (560 ft) pt scores below age norms and indicates overall decreased endurance  Pt scored 36/56 on Contreras indicating impaired balance and considered moderate fall risk  Based on above findings pt will benefit from skilled PT to improve strength and balance, reduce fall risk, and maximize function  Impairments: abnormal gait, activity intolerance, impaired balance, impaired physical strength and lacks appropriate home exercise program  Understanding of Dx/Px/POC: good   Prognosis: good    Goals  Goals  STG 30 days    1  Patient will improve static balance with feet together eyes closed on foam surface to 30 seconds indicating reduction in fall risk  - NOT MET  2   Patient will achieve 190 feet improvement with overall distance achieved of 750 feet with 6 minute walk test which is Minimal Detectable Change pre current research standards with endurance to demonstrate enhance functional capacity -NOT MET  3  Patient will display 2 3  second improvement with overall score of 17 seconds  with TUG test or lower with noted improvement being Minimal Detectable Change pre current research standards with fall risk  -NOT MET    4  Patient will achieve 42/56 TADEO score with minimal improve by 6 points or more demonstrating Minimal Detectable change per current research standards for this objective test which assess fall risk  -NOT MET   5  Patient will achieve   59 ft/sec improvement with score of  ft/sec with 10 Meter Walk test, demonstrating Minimal Detectable change per current research standards for this objective test which assess fall risk  -NOT MET  6  Patient will perform  5 x sit to stand test to 15 seconds score indicating improvement with functional endurance- NOT MET      LT days   1  Patient will score low risk for falls with 3/4 fall risk measures  -NOT MET  2  Patient will be able to ambulate 940 feet with [facility] during 6 minute walk test  -NOT MET   3  Patient will be able to ambulate outdoors with [facility] without any loss of balance  -NOT MET    Cut off score   All date taken from [facility] Neuro Section or Rehab Measures    TADEO test: 46/56                                              5 x STS Test:  MDC: 6 points                                                  MDC: 2 3 seconds   age norms                                                                 Age Norms   61-76 year old = M: 54, F: 55                        62-78 year old: 11 4 seconds   66-77 year old = M 47,  F: 50                       71-76 year old: 12 6 seconds    80-80 year old = M46,   F: 48                       80-80 year old: 14 8 seconds     TUG test:                                                                     10 Meter Walk Test:  MDC: 4 14 seconds       MDC:  59 ft/sec  Cut off score for Falls                                                  Age Norms  > 13 5 seconds community dwelling adults                20-29; M: 4 56 ft/sec F: 4 62 ft/sec  > 32 2 [facility]                                                     30-39: M 4 76 ft/sec  F: 4 68 ft/sec          40-49: M: 4 79 ft/sec  F: 4 62 ft/sec  6 Minute Walk Test      50-59: M: 4 76 ft/sec  F: 4 56 ft/sec  MDC: 190 feet       60-69: M: 4 56 ft/sec  F: 4 26 ft/sec  Age Norms       70-+    M: 4 36 ft/sec  F: 4 16 ft/sec  60-69:    M: 1876 F: 3497  37-40:    M: 1729 F: 0013  85-31 +: M: 3854 F; 1286     Plan  Patient would benefit from: PT eval  Planned modality interventions: biofeedback, thermotherapy: hydrocollator packs and cryotherapy  Planned therapy interventions: activity modification, balance, balance/weight bearing training, community reintegration, flexibility, functional ROM exercises, gait training, graded activity, graded exercise, home exercise program, graded motor, transfer training, therapeutic training, therapeutic exercise, therapeutic activities, stretching, strengthening, postural training, patient education, neuromuscular re-education and manual therapy  Frequency: 2x week  Duration in weeks: 12  Plan of Care beginning date: 10/22/2018  Plan of Care expiration date: 1/14/2019  Treatment plan discussed with: patient        Subjective Evaluation    History of Present Illness  Mechanism of injury: In 2011 pt had cervical surgery (C2-C7), went to [facility] for rehabilitation for 3 weeks and continued with outpatient PT services  Pt had TKR's a few years ago; got C diff in the hospital and went to rehab after that also  Pt was using SPC but started using RW about 6 months ago  Pt's last fall was January 2018 but she is fearful of falling and does not feel confident with her gait and balance  Pt presents to PT to improve her gait  11/21/18  Patient reports that she feels she has only improved by 10% and has not made much improvements   Feels she is limited due to knee pain on the L when trying to keep knee bent and while doing exercises or activities requiring standing or walking     Quality of life: good    Pain  Current pain ratin  At best pain ratin  At worst pain ratin  Location: L knee on the inside of the knee  Quality: sharp    Social Support  Steps to enter house: yes (1)  Stairs in house: yes (FF stairs)   Lives in: multiple-level home  Lives with: spouse    Hand dominance: ambidextrous    Treatments  Previous treatment: physical therapy  Patient Goals  Patient goals for therapy: increased strength, independence with ADLs/IADLs, return to sport/leisure activities and improved balance  Patient goal: be able to improve her walking and walk with SPC         Objective     Strength/Myotome Testing     Left Hip   Planes of Motion   Flexion: 4-  Abduction: 3+  Adduction: 4-    Right Hip   Planes of Motion   Flexion: 4-  Abduction: 3+  Adduction: 4-    Left Knee   Flexion: 4  Extension: 4    Right Knee   Flexion: 4  Extension: 4    Left Ankle/Foot   Dorsiflexion: 4  Plantar flexion: 4-    Right Ankle/Foot   Dorsiflexion: 4  Plantar flexion: 4-    Ambulation     Comments   TUG= 20 sec with RW  Gait speed= 10m / 17 sec = 0 59 m/sec  6 MWT= 560 ft with RW      18  TUG= 20 55 sec with RW  Gait speed= 0 62 m/sec  6 MWT= 700 ft with RW          Functional Assessment     Comments  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  Coordination intact- finger to nose, heel to shin     5x STS= 18 seconds    Contreras= 39/56     Feet Together Eyes Open= 30 seconds  Feet Together Eyes Closed= 30 seconds    18  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  Coordination intact- finger to nose, heel to shin     5x STS= 16 seconds  30 sec STS: 9 times  Contreras= 36/56     Feet Together Eyes Open= 30 seconds  Feet Together Eyes Closed= 30 seconds  Feet Together Eyes Open (Foam)= 30 seconds  Feet Together Eyes Closed (Foam)=6 seconds      PT/OT Neuro Exam  Neurologic Exam     Gait, Coordination, and Reflexes TUG= 20 sec with RW  Gait speed= 10m / 17 sec = 0 59 m/sec  6 MWT= 560 ft with RW      11/21/18  TUG= 20 55 sec with RW  Gait speed= 0 62 m/sec  6 MWT= 700 ft with RW             Precautions: falls    Daily Treatment Diary     Manual                                                                                   Exercise Diary  11/21            Neuro Testing 40 min                                                                                                                                                                                                                                                                        Modalities                                                               Flowsheet Rows      Most Recent Value   PT/OT G-Codes   Current Score  45   Projected Score  66   Assessment Type  Re-evaluation   G code set  Mobility: Walking & Moving Around   Mobility: Walking and Moving Around Current Status ()  CK   Mobility: Walking and Moving Around Goal Status ()  CJ

## 2018-11-26 ENCOUNTER — OFFICE VISIT (OUTPATIENT)
Dept: PHYSICAL THERAPY | Facility: CLINIC | Age: 73
End: 2018-11-26
Payer: MEDICARE

## 2018-11-26 DIAGNOSIS — R26.9 GAIT ABNORMALITY: Primary | ICD-10-CM

## 2018-11-26 PROCEDURE — 97112 NEUROMUSCULAR REEDUCATION: CPT

## 2018-11-26 PROCEDURE — 97110 THERAPEUTIC EXERCISES: CPT

## 2018-11-26 NOTE — PROGRESS NOTES
Daily Note     Today's date: 2018  Patient name: Jeanne Miguel  :   MRN: [id number]  Referring provider: Shyam Don MD  Dx:   Encounter Diagnosis     ICD-10-CM    1  Gait abnormality R26 9        Start Time: 1300  Stop Time: 0659  Total time in clinic (min): 45 minutes    Subjective: Patient reports that her knee pain BL has improved since last visit  She states that having PT support for her L knee to prevent hyperextension helps reduce her pain  Patient reports that she wants to begin using a 3 wheel rollator and states she already has a 4 wheel rollator at home  But she feels it will be easier to move around in stores and at home with a 3 wheel rollator  Objective: See treatment diary below    Precautions: Fall Risk, No past medical history on file        Daily Treatment Diary     Manual                                                                                   Exercise Diary  10/29 10/31 11/5 11/7 11/12 11/15 11/19 11/26/18     Mini-Squats with Knee flexion hold 20 reps, 3 seconds asecdning and 3 seconds descending 20 reps, 3 seconds asecdning and 3 seconds descending 20 reps, 3 seconds asecdning and 3 seconds descending 20 reps, 3 seconds asecdning and 3 seconds descending 20 reps, 3 seconds asecdning and 3 seconds descending 20 reps, 3 seconds asecdning and 3 seconds descending 20 reps, 3 seconds asecdning and 3 seconds descending     With green TB  20 reps, 3 seconds asecdning and 3 seconds descending     With green TB      Side Stepping with mini-squat 2 UE, 10 cycles  2 UE, 10 cycles  2 UE, 10 cycles 2 UE, 10 cycles  Side-stepping with green TB Side-steping with green TB Side-steping      Hip Flexion with mini-Squat 20 reps, 3 seconds holds 20 reps, 3 seconds holds 20 reps, 3 seconds holds 20 reps, 3 seconds holds  1# ankle weight    20 reps, 3 seconds holds  2# ankle weigh     Hip Extension with mini-squat 20 reps, 3 seconds holds 20 reps, 3 seconds holds 20 reps, 3 seconds holds 20 reps, 3 seconds holds  1#         Hip Abduction 20 reps, 3 seconds holds 20 reps, 3 seconds holds 20 reps, 3 seconds holds 20 reps, 3 seconds holds  1# ankle weight  20 reps, 3 seconds holds  2# ankle weight 20 reps, 3 second holds  2# ankle weight      Seated Adduction ball squeezes 20 reps, 3 second holds 25 reps, 3 second holds  25 reps, 3 second holds         Hurdles- Forwards, Laterally, Backwards 2 UE holds, 3 consecutive hurdles, 10 cycles each direction 2 UE holds, 3 consecutive hurdles, 10 cycles each direction 2 UE holds, 3 consecutive hurdles, 10 cycles each direction          Sit to stands No UE, 2 airex pads on seat, 20 reps, ball between knees No UE, 2 airex pads on seat, 20 reps, ball between knees No UE, 2 airex pads on seat, 20 reps, ball between knees No UE, 2 airex pads on seat, 20 reps No UE, 2 airex pads on seat, 20 reps No UE, 2 airex pads on seat, 20 reps No UE, 2 airex pads on seat, 20 reps    Green TB       FTEO on Foam, no UE 30 seconds, 2 sets 30 seconds, 2 sets 30 seconds, 2 sets 30 seconds, 2         Standing Hamstring Curls  20 reps, 3 second holds bilaterally   20 reps, 3 second holds bilaterally   1#  20 reps     2# ankle weights 20 reps     2# ankle weights      HR's/TR's    1#  2 x 10         BOSU Lunges     2 x 10    3" hold BL UE support 2 x 10    3" hold BL UE support 2 x 10    3" hold  BUE support    therapist maintaining L knee in neutral 2 x 10    3" hold  BUE support    therapist maintaining L knee in neutral     Toe Taps      2 x 10   8" block        Heel Toe Walking     6 cycles forward and backward         Step Ups      6" step  BUE's  20 reps  laterall 9"  2 x 10 3" hold each   PT tactile cue for hyperextension on the L      Tandem Stances     3 x 30" Firm BL UE tactile cues        TKE's      20 reps  Green TB 20 reps  GreenTB      SLR      2# ankle weights    2x10 2# ankle weights    2x10      Rollator ambulation        6'                      Modalities Assessment: Tolerated treatment well  Unable to complete side stepping with GTB due to medial L knee pain which reduced after removing TB  Continued to complete mini squats, lunges,  and side stepping with PT tactile cues to prevent hyperextension of L knee  Began training with rollator, as she wants to learn how to with a 3 wheel rollator for home and in the community  Plan to have patient fitted for brace next visit  Plan: Continue per plan of care

## 2018-11-29 ENCOUNTER — APPOINTMENT (OUTPATIENT)
Dept: PHYSICAL THERAPY | Facility: CLINIC | Age: 73
End: 2018-11-29
Payer: MEDICARE

## 2018-12-03 NOTE — PROGRESS NOTES
Self-discharge    Pt called facility on Friday 11/30 and spoke with PT requesting to cancel all future appts  Pt reports she has decided to get a swedish knee cage brace from her orthopedist instead of through [facility]  Educated pt that she will still benefit from continuing PT once she receives the brace but pt reports she still wishes to cancel appts at this time and she will call if she decides to start up again in the future  Pt is considered self-discharge at this time

## 2019-06-17 ENCOUNTER — EVALUATION (OUTPATIENT)
Dept: PHYSICAL THERAPY | Facility: CLINIC | Age: 74
End: 2019-06-17
Payer: MEDICARE

## 2019-06-17 ENCOUNTER — TRANSCRIBE ORDERS (OUTPATIENT)
Dept: PHYSICAL THERAPY | Facility: CLINIC | Age: 74
End: 2019-06-17

## 2019-06-17 DIAGNOSIS — Z96.652 STATUS POST TOTAL LEFT KNEE REPLACEMENT: Primary | ICD-10-CM

## 2019-06-17 PROCEDURE — 97162 PT EVAL MOD COMPLEX 30 MIN: CPT | Performed by: PHYSICAL THERAPIST

## 2019-06-17 RX ORDER — LOPERAMIDE HYDROCHLORIDE 2 MG/1
2 CAPSULE ORAL DAILY PRN
COMMUNITY
End: 2022-02-02 | Stop reason: SDUPTHER

## 2019-06-17 RX ORDER — DULOXETIN HYDROCHLORIDE 60 MG/1
20 CAPSULE, DELAYED RELEASE ORAL DAILY
COMMUNITY
End: 2022-02-02 | Stop reason: SDUPTHER

## 2019-06-17 RX ORDER — ATORVASTATIN CALCIUM 20 MG/1
20 TABLET, FILM COATED ORAL DAILY
COMMUNITY
End: 2022-02-02 | Stop reason: ALTCHOICE

## 2019-06-17 RX ORDER — TRAMADOL HYDROCHLORIDE 50 MG/1
50 TABLET ORAL EVERY 6 HOURS PRN
COMMUNITY
End: 2022-03-22 | Stop reason: SDUPTHER

## 2019-06-19 ENCOUNTER — OFFICE VISIT (OUTPATIENT)
Dept: PHYSICAL THERAPY | Facility: CLINIC | Age: 74
End: 2019-06-19
Payer: MEDICARE

## 2019-06-19 DIAGNOSIS — Z96.652 STATUS POST TOTAL LEFT KNEE REPLACEMENT: Primary | ICD-10-CM

## 2019-06-19 PROCEDURE — 97140 MANUAL THERAPY 1/> REGIONS: CPT

## 2019-06-19 PROCEDURE — 97112 NEUROMUSCULAR REEDUCATION: CPT

## 2019-06-21 ENCOUNTER — OFFICE VISIT (OUTPATIENT)
Dept: PHYSICAL THERAPY | Facility: CLINIC | Age: 74
End: 2019-06-21
Payer: MEDICARE

## 2019-06-21 DIAGNOSIS — Z96.652 STATUS POST TOTAL LEFT KNEE REPLACEMENT: Primary | ICD-10-CM

## 2019-06-21 PROCEDURE — 97112 NEUROMUSCULAR REEDUCATION: CPT

## 2019-06-21 PROCEDURE — 97140 MANUAL THERAPY 1/> REGIONS: CPT

## 2019-06-24 ENCOUNTER — OFFICE VISIT (OUTPATIENT)
Dept: PHYSICAL THERAPY | Facility: CLINIC | Age: 74
End: 2019-06-24
Payer: MEDICARE

## 2019-06-24 DIAGNOSIS — Z96.652 STATUS POST TOTAL LEFT KNEE REPLACEMENT: Primary | ICD-10-CM

## 2019-06-24 PROCEDURE — 97112 NEUROMUSCULAR REEDUCATION: CPT

## 2019-06-24 PROCEDURE — 97140 MANUAL THERAPY 1/> REGIONS: CPT

## 2019-06-26 ENCOUNTER — OFFICE VISIT (OUTPATIENT)
Dept: PHYSICAL THERAPY | Facility: CLINIC | Age: 74
End: 2019-06-26
Payer: MEDICARE

## 2019-06-26 DIAGNOSIS — Z96.652 STATUS POST TOTAL LEFT KNEE REPLACEMENT: Primary | ICD-10-CM

## 2019-06-26 PROCEDURE — 97140 MANUAL THERAPY 1/> REGIONS: CPT

## 2019-06-26 PROCEDURE — 97112 NEUROMUSCULAR REEDUCATION: CPT

## 2019-06-28 ENCOUNTER — APPOINTMENT (OUTPATIENT)
Dept: PHYSICAL THERAPY | Facility: CLINIC | Age: 74
End: 2019-06-28
Payer: MEDICARE

## 2019-07-01 ENCOUNTER — OFFICE VISIT (OUTPATIENT)
Dept: PHYSICAL THERAPY | Facility: CLINIC | Age: 74
End: 2019-07-01
Payer: MEDICARE

## 2019-07-01 DIAGNOSIS — Z96.652 STATUS POST TOTAL LEFT KNEE REPLACEMENT: Primary | ICD-10-CM

## 2019-07-01 PROCEDURE — 97140 MANUAL THERAPY 1/> REGIONS: CPT

## 2019-07-01 PROCEDURE — 97112 NEUROMUSCULAR REEDUCATION: CPT

## 2019-07-01 NOTE — PROGRESS NOTES
Daily Note     Today's date: 2019  Patient name: Santiago Leija  :   MRN: [id number]  Referring provider: Magda Oconnor MD  Dx:   Encounter Diagnosis     ICD-10-CM    1  Status post total left knee replacement X64 019                   Subjective: Patient states she has been ambulating with her cane around the house as she is starting to feel more comfortable  She has taken a few steps with out her cane, but is still nervous  She states she was able to do more yesterday, grocery shop, run the sweeper and make dinner as this is more than she has done in one day in a while  Objective: See treatment diary below  Diagnosis: Revision L TKA   Precautions: -   Manuals    PROM - no stretching for ROM KLS   TJH  KLS KLS                                          Exercise Diary            Bike    5 min 5 min     Alter-G 5 min 77% 1 2 mph      5 min 75% 1 0 mph   TM            Gait with  feet CGA   40 feet x 2   feet  feet CGA                 SLR  0# 3x12   0# 2x12 0# 2x12 0# 3x10   SLR abd 0# 2x12   0# 2x10 0# 2x12 0# 2x12    SL ER RTB 2x10   RTB 2x10 RTB 3x10 RTB    Step-ups 6in 2x10 one hand    6 in 2x10 CGA 6 in 2x10 6 in 2x10   Heel raises 3x10   2x10 2x10 2x10   CKC TKE 10# 3x10    10# 2x10     Bridges  :03x20    :30x20 :03x20  :03x20    Side stepping GTB 15'x4    RTB 15'x4 RTB 15'x4                                                       Modalities            CP PRN                                              Assessment: Continued with outlined program  Increased gait speed on Alter-G with cues for proper gait sequence  She is progressing well with gait, just remains fearful and requires encouragement  Patient demonstrates hyperextension with gait using SPC, difficulty initiating stride, but able to maintain  She was able to perform step ups with use of one UE  Plan: Progress treatment as tolerated

## 2019-07-03 ENCOUNTER — OFFICE VISIT (OUTPATIENT)
Dept: PHYSICAL THERAPY | Facility: CLINIC | Age: 74
End: 2019-07-03
Payer: MEDICARE

## 2019-07-03 DIAGNOSIS — Z96.652 STATUS POST TOTAL LEFT KNEE REPLACEMENT: Primary | ICD-10-CM

## 2019-07-03 PROCEDURE — 97112 NEUROMUSCULAR REEDUCATION: CPT

## 2019-07-03 PROCEDURE — 97140 MANUAL THERAPY 1/> REGIONS: CPT

## 2019-07-03 NOTE — PROGRESS NOTES
Daily Note     Today's date: 7/3/2019  Patient name: Maurice May  :   MRN: [id number]  Referring provider: Juliette Darden MD  Dx:   Encounter Diagnosis     ICD-10-CM    1  Status post total left knee replacement [id number]                   Subjective:  Patient states she feels pretty good today  She was sore following last visit  Objective: See treatment diary below  Diagnosis: Revision L TKA   Precautions: -   Manuals 7/1 7/3  6/24 6/26   PROM - no stretching for ROM KLS KLS   KLS KLS                                       Exercise Diary           Bike    5 min     Alter-G 5 min 77% 1 2 mph 5 min 90% 1 3 mph     5 min 75% 1 0 mph   TM           Gait with  feet  feet CGA  120 feet  feet CGA                SLR  0# 3x12 0# 3x12  0# 2x12 0# 3x10   SLR abd 0# 2x12 0# 2x12  0# 2x12 0# 2x12    SL ER RTB 2x10 GTB 2x10  RTB 3x10 RTB    Step-ups 6in 2x10 one hand  8in one hand 2x10  6 in 2x10 6 in 2x10   Heel raises 3x10 3x10  2x10 2x10   CKC TKE 10# 3x10 11# 3x10  10# 2x10     Bridges  :03x20  :53G20  :03x20  :03x20    Side stepping GTB 15'x4 GTB 15'x4  RTB 15'x4 RTB 15'x4                                                   Modalities           CP PRN                                           Assessment: Continued with outlined program  Increased gait speed on Alter-G with cues for proper gait sequence  She was able to increase distance with SPC, hyperextension on RLE limiting stability  She was able to increase step height with step ups, using one hand (occasionally two hands)  Plan: Progress treatment as tolerated

## 2019-07-05 ENCOUNTER — APPOINTMENT (OUTPATIENT)
Dept: PHYSICAL THERAPY | Facility: CLINIC | Age: 74
End: 2019-07-05
Payer: MEDICARE

## 2019-07-08 ENCOUNTER — OFFICE VISIT (OUTPATIENT)
Dept: PHYSICAL THERAPY | Facility: CLINIC | Age: 74
End: 2019-07-08
Payer: MEDICARE

## 2019-07-08 DIAGNOSIS — Z96.652 STATUS POST TOTAL LEFT KNEE REPLACEMENT: Primary | ICD-10-CM

## 2019-07-08 PROCEDURE — 97140 MANUAL THERAPY 1/> REGIONS: CPT

## 2019-07-08 PROCEDURE — 97116 GAIT TRAINING THERAPY: CPT

## 2019-07-08 PROCEDURE — 97112 NEUROMUSCULAR REEDUCATION: CPT

## 2019-07-08 NOTE — PROGRESS NOTES
Daily Note     Today's date: 2019  Patient name: Maurice May  :   MRN: [id number]  Referring provider: Juliette Darden MD  Dx:   Encounter Diagnosis     ICD-10-CM    1  Status post total left knee replacement [id number] [id number]                   Subjective:  Patient states she feels a little sore today following a lot of walking yesterday  She states she was walking with her RW  Objective: See treatment diary below  Diagnosis: Revision L TKA   Precautions: -   Manuals 7/1 7/3 7/8  6/26   PROM - no stretching for ROM KLS KLS KLS  KLS                                    Exercise Diary          Bike         Alter-G 5 min 77% 1 2 mph 5 min 90% 1 3 mph  5 min 91% 1 3 mph   5 min 75% 1 0 mph   TM          Gait with  feet  feet  feet CGA   120 feet CGA               SLR  0# 3x12 0# 3x12 0# 3x12  0# 3x10   SLR abd 0# 2x12 0# 2x12 0# 2x12   0# 2x12    SL ER RTB 2x10 GTB 2x10 GTB 2x10   RTB    Step-ups 6in 2x10 one hand  8in one hand 2x10 8in one hand 2x10  6 in 2x10   Heel raises 3x10 3x10 3x10  2x10   CKC TKE 10# 3x10 11# 3x10 11# 3x10       Bridges  :03x20  :03x20 :03x30   :03x20    Side stepping GTB 15'x4 GTB 15'x4 GTB 15'x4   RTB 15'x4                                               Modalities           CP PRN                                           Assessment: Continued with outlined program  Patient demonstrates improved gait pattern while on Alter-G, cued for proper sequence  She remains unstable with SPC gait, CGA  LE strength slowly improving  Plan: Progress treatment as tolerated

## 2019-07-10 ENCOUNTER — OFFICE VISIT (OUTPATIENT)
Dept: PHYSICAL THERAPY | Facility: CLINIC | Age: 74
End: 2019-07-10
Payer: MEDICARE

## 2019-07-10 DIAGNOSIS — Z96.652 STATUS POST TOTAL LEFT KNEE REPLACEMENT: Primary | ICD-10-CM

## 2019-07-10 PROCEDURE — 97112 NEUROMUSCULAR REEDUCATION: CPT

## 2019-07-10 PROCEDURE — 97140 MANUAL THERAPY 1/> REGIONS: CPT

## 2019-07-10 PROCEDURE — 97116 GAIT TRAINING THERAPY: CPT

## 2019-07-10 NOTE — PROGRESS NOTES
Daily Note     Today's date: 7/10/2019  Patient name: Amna Henley  : [birth year]  MRN: [id number]  Referring provider: Roseann Miranda MD  Dx:   Encounter Diagnosis     ICD-10-CM    1  Status post total left knee replacement [id number]                   Subjective: Patient states she was weeding her garden with a lot of up and down motion and has soreness in her quad muscles  Objective: See treatment diary below  Diagnosis: Revision L TKA   Precautions: -   Manuals 7/1 7/3 7/8 7/10    PROM - no stretching for ROM KLS KLS KLS KLS                                           Exercise Diary            Bike            Alter-G 5 min 77% 1 2 mph 5 min 90% 1 3 mph  5 min 91% 1 3 mph  5 min 92% 1 3 mph for gait    TM            Gait with  feet  feet  feet CGA  200 ft CGA                 SLR  0# 3x12 0# 3x12 0# 3x12 1# 3x12    SLR abd 0# 2x12 0# 2x12 0# 2x12  0# 2x10    SL ER RTB 2x10 GTB 2x10 GTB 2x10  GTB 2x10    Step-ups 6in 2x10 one hand  8in one hand 2x10 8in one hand 2x10 8in one hand 3x10    Heel raises 3x10 3x10 3x10 3x10    CKC TKE 10# 3x10 11# 3x10 11# 3x10  12# 3x10    Bridges  :03x20  :03x20 :03x30  :03x30    Side stepping GTB 15'x4 GTB 15'x4 GTB 15'x4  GTB 15'x4    Sit to stands       One hand 15x                                           Modalities            CP PRN                                          Assessment: Continued with outlined program  Trialed sit to stands; able to perform with one UE with moderate muscle fatigue  Cued for proper technique with sidelying abduction  Plan: Progress treatment as tolerated

## 2019-07-12 ENCOUNTER — APPOINTMENT (OUTPATIENT)
Dept: PHYSICAL THERAPY | Facility: CLINIC | Age: 74
End: 2019-07-12
Payer: MEDICARE

## 2019-07-15 ENCOUNTER — EVALUATION (OUTPATIENT)
Dept: PHYSICAL THERAPY | Facility: CLINIC | Age: 74
End: 2019-07-15
Payer: MEDICARE

## 2019-07-15 ENCOUNTER — TRANSCRIBE ORDERS (OUTPATIENT)
Dept: PHYSICAL THERAPY | Facility: CLINIC | Age: 74
End: 2019-07-15

## 2019-07-15 DIAGNOSIS — Z96.652 STATUS POST TOTAL LEFT KNEE REPLACEMENT: Primary | ICD-10-CM

## 2019-07-15 PROCEDURE — 97140 MANUAL THERAPY 1/> REGIONS: CPT | Performed by: PHYSICAL THERAPIST

## 2019-07-15 PROCEDURE — 97110 THERAPEUTIC EXERCISES: CPT | Performed by: PHYSICAL THERAPIST

## 2019-07-15 PROCEDURE — 97112 NEUROMUSCULAR REEDUCATION: CPT | Performed by: PHYSICAL THERAPIST

## 2019-07-15 NOTE — PROGRESS NOTES
PT Re-evaluation    Today's date: 7/15/2019  Patient name: France Starr  :   MRN: [id number]  Referring provider: Leo Delaney MD  Dx:   Encounter Diagnosis     ICD-10-CM    1  Status post total left knee replacement Z96 652                   Assessment  Assessment details: Patient is a 75 y o  female who presents with the above listed impairments  Patient would continue to benefit from skilled PT services to address these impairments and to maximize function  Thank you for the referral     Impairments: abnormal coordination, abnormal gait, abnormal muscle firing, abnormal movement, activity intolerance, impaired balance, impaired physical strength and pain with function  Understanding of Dx/Px/POC: good   Prognosis: good    Goals  Impairment Goals  - Decrease pain by 50% in 4 weeks  - progressing  - Increase left lower extremity strength [employer]antrotter to 4+/5 in 6 weeks  - progressing    Functional Goals  - Will be able to ambulate without assistive device in 6 weeks  - progressing  - will be able to swing a golf club in 8 weeks  - progressing  - Patient will be independent with HEP in 8 weeks  - progressing    Plan  Patient would benefit from: skilled PT  Planned modality interventions: cryotherapy  Planned therapy interventions: joint mobilization, manual therapy, neuromuscular re-education, patient education, strengthening, stretching, therapeutic activities, therapeutic exercise, home exercise program, functional ROM exercises, Talavera taping and postural training  Frequency: 3x week (2-3x week)  Duration in weeks: 6  Treatment plan discussed with: patient, PTA and referring physician        Subjective Evaluation    History of Present Illness  Mechanism of injury: Pt notes a 70% improvement to date  She notes an overall improvement with gait, pain, ROM, and strength  She does however state she feels very uncomfortable with walking with a SPC    She states she is using a RW outside of PT   Pain  Current pain ratin  At best pain ratin  At worst pain ratin  Location: L knee    Patient Goals  Patient goal: play golf and walk without SPC        Objective     Tenderness     Additional Tenderness Details  Medial and lateral knee grossly on the L  Active Range of Motion   Left Knee   Flexion: 120 degrees   Extension: 0 degrees     Strength/Myotome Testing     Additional Strength Details  4/5 strength noted grossly LLE  A quad lag noted with SLR  Tests     Additional Tests Details        Swelling     Left Knee Girth Measurement (cm)   10 cm above joint line: 38 cm    Right Knee Girth Measurement (cm)   10 cm above joint line: 35 cm    General Comments:      Knee Comments  Neurovascular intact  Gait was antalgic in nature  Pt does have a hx of c/s surgery with possible LE residual deficits as per patient  This may affect gait and balance during her progression of PT  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 and does affect her progression of gait  Possible use ot functional knee brace discussed and to be considered  Will discuss further with referring physician        Flowsheet Rows      Most Recent Value   PT/OT G-Codes   Current Score  55   Projected Score  49   FOTO information reviewed  Yes             Diagnosis: Revision L TKA   Precautions: -   Manuals 7/1 7/3 7/8 7/10 7/15   PROM - no stretching for ROM KLS KLS KLS KLS CMF                              Re-eval         CMF   Exercise Diary            Bike            Alter-G 5 min 77% 1 2 mph 5 min 90% 1 3 mph  5 min 91% 1 3 mph  5 min 92% 1 3 mph for gait 5 min 92% 1 3 mph for gait   TM            Gait with  feet  feet  feet CGA  200 ft  ft CGA                SLR  0# 3x12 0# 3x12 0# 3x12 1# 3x12 1# 3x12   SLR abd 0# 2x12 0# 2x12 0# 2x12  0# 2x10 0# 2x10   SL ER RTB 2x10 GTB 2x10 GTB 2x10  GTB 2x10 GTB 2x10   Step-ups 6in 2x10 one hand  8in one hand 2x10 8in one hand 2x10 8in one hand 3x10 8'' 3x10 Heel raises 3x10 3x10 3x10 3x10 3x10   CKC TKE 10# 3x10 11# 3x10 11# 3x10  12# 3x10 12# 3x10   Bridges  :03x20  :03x20 :03x30  :03x30 :03 x30   Side stepping GTB 15'x4 GTB 15'x4 GTB 15'x4  GTB 15'x4 GTB 15'x4   Sit to stands       One hand 15x                                           Modalities            CP PRN

## 2019-07-15 NOTE — LETTER
July 15, 2019    Farhad Leon MD  [address] [place], [place] [address] [address] [address]    Patient: Rafael Kan   YOB: 1945   Date of Visit: 7/15/2019     Encounter Diagnosis     ICD-10-CM    1  Status post total left knee replacement [id number] 652        Dear Dr Julianna Olivier:    Please review the attached Plan of Care from [facility] recent visit  Please verify that you agree therapy should continue by signing the attached document and sending it back to our office  If you have any questions or concerns, please don't hesitate to call  Sincerely,    Luisa Post, PT      Referring Provider:      I certify that I have read the below Plan of Care and certify the need for these services furnished under this plan of treatment while under my care  Farhad Leon MD  [address] [place], [place] [address] [address] [place] [address] [place]: [phone]          PT Re-evaluation    Today's date: 7/15/2019  Patient name: Rafael Kan  :   MRN: [id number]  Referring provider: Army Oppenheim , MD  Dx:   Encounter Diagnosis     ICD-10-CM    1  Status post total left knee replacement Z96 652                   Assessment  Assessment details: Patient is a 75 y o  female who presents with the above listed impairments  Patient would continue to benefit from skilled PT services to address these impairments and to maximize function  Thank you for the referral     Impairments: abnormal coordination, abnormal gait, abnormal muscle firing, abnormal movement, activity intolerance, impaired balance, impaired physical strength and pain with function  Understanding of Dx/Px/POC: good   Prognosis: good    Goals  Impairment Goals  - Decrease pain by 50% in 4 weeks  - progressing  - Increase left lower extremity strength [employer] to 4+/5 in 6 weeks  - progressing    Functional Goals  - Will be able to ambulate without assistive device in 6 weeks   - progressing  - will be able to swing a golf club in 8 weeks  - progressing  - Patient will be independent with HEP in 8 weeks  - progressing    Plan  Patient would benefit from: skilled PT  Planned modality interventions: cryotherapy  Planned therapy interventions: joint mobilization, manual therapy, neuromuscular re-education, patient education, strengthening, stretching, therapeutic activities, therapeutic exercise, home exercise program, functional ROM exercises, Talavera taping and postural training  Frequency: 3x week (2-3x week)  Duration in weeks: 6  Treatment plan discussed with: patient, PTA and referring physician        Subjective Evaluation    History of Present Illness  Mechanism of injury: Pt notes a 70% improvement to date  She notes an overall improvement with gait, pain, ROM, and strength  She does however state she feels very uncomfortable with walking with a SPC  She states she is using a RW outside of PT  Pain  Current pain ratin  At best pain ratin  At worst pain ratin  Location: L knee    Patient Goals  Patient goal: play golf and walk without SPC        Objective     Tenderness     Additional Tenderness Details  Medial and lateral knee grossly on the L  Active Range of Motion   Left Knee   Flexion: 120 degrees   Extension: 0 degrees     Strength/Myotome Testing     Additional Strength Details  4/5 strength noted grossly LLE  A quad lag noted with SLR  Tests     Additional Tests Details        Swelling     Left Knee Girth Measurement (cm)   10 cm above joint line: 38 cm    Right Knee Girth Measurement (cm)   10 cm above joint line: 35 cm    General Comments:      Knee Comments  Neurovascular intact  Gait was antalgic in nature  Pt does have a hx of c/s surgery with possible LE residual deficits as per patient  This may affect gait and balance during her progression of PT  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 and does affect her progression of gait    Possible use ot functional knee brace discussed and to be considered  Will discuss further with referring physician        Flowsheet Rows      Most Recent Value   PT/OT G-Codes   Current Score  55   Projected Score  49   FOTO information reviewed  Yes             Diagnosis: Revision L TKA   Precautions: -   Manuals 7/1 7/3 7/8 7/10 7/15   PROM - no stretching for ROM KLS KLS KLS KLS CMF                              Re-eval         CMF   Exercise Diary            Bike            Alter-G 5 min 77% 1 2 mph 5 min 90% 1 3 mph  5 min 91% 1 3 mph  5 min 92% 1 3 mph for gait 5 min 92% 1 3 mph for gait   TM            Gait with  feet  feet  feet CGA  200 ft  ft CGA                SLR  0# 3x12 0# 3x12 0# 3x12 1# 3x12 1# 3x12   SLR abd 0# 2x12 0# 2x12 0# 2x12  0# 2x10 0# 2x10   SL ER RTB 2x10 GTB 2x10 GTB 2x10  GTB 2x10 GTB 2x10   Step-ups 6in 2x10 one hand  8in one hand 2x10 8in one hand 2x10 8in one hand 3x10 8'' 3x10   Heel raises 3x10 3x10 3x10 3x10 3x10   CKC TKE 10# 3x10 11# 3x10 11# 3x10  12# 3x10 12# 3x10   Bridges  :03x20  :03x20 :03x30  :03x30 :03 x30   Side stepping GTB 15'x4 GTB 15'x4 GTB 15'x4  GTB 15'x4 GTB 15'x4   Sit to stands       One hand 15x                                           Modalities            CP PRN

## 2019-07-17 ENCOUNTER — OFFICE VISIT (OUTPATIENT)
Dept: PHYSICAL THERAPY | Facility: CLINIC | Age: 74
End: 2019-07-17
Payer: MEDICARE

## 2019-07-17 DIAGNOSIS — Z96.652 STATUS POST TOTAL LEFT KNEE REPLACEMENT: Primary | ICD-10-CM

## 2019-07-17 PROCEDURE — 97112 NEUROMUSCULAR REEDUCATION: CPT

## 2019-07-17 PROCEDURE — 97110 THERAPEUTIC EXERCISES: CPT

## 2019-07-17 NOTE — PROGRESS NOTES
Daily Note     Today's date: 2019  Patient name: Darek Healy  : [id number]  MRN: [id number]  Referring provider: Eleanor Babb MD  Dx:   Encounter Diagnosis     ICD-10-CM    1  Status post total left knee replacement [id number] [id number]                   Subjective: Patient states she continues to notice an improvement in her strength  She remains challenged with balance and stability while ambulating  Objective: See treatment diary below      Assessment: Continued with outlined program  Trialed balance and proprioception; requires UE support intermittently due to decrease stability  She was able to progress with overall strengthening with moderate muscle fatigue  Plan: Progress treatment as tolerated         Diagnosis: Revision L TKA   Precautions: -   Manuals 7/17  7/8 7/10 7/15   PROM - no stretching for ROM KLS  KLS KLS CMF                            Re-eval        CMF   Exercise Diary           Bike           Alter-G 5 min 94% 1 4 mph  5 min 91% 1 3 mph  5 min 92% 1 3 mph for gait 5 min 92% 1 3 mph for gait   TM           Gait with  feet CGA  200 feet CGA  200 ft  ft CGA               SLR  1# 3x12  0# 3x12 1# 3x12 1# 3x12   SLR abd 0# 2x10   0# 2x12  0# 2x10 0# 2x10   SL ER GTB 3x10  GTB 2x10  GTB 2x10 GTB 2x10   Step-ups 8in 3x10   8in one hand 2x10 8in one hand 3x10 8'' 3x10   Heel raises 3x10   3x10 3x10 3x10   CKC TKE   11# 3x10  12# 3x10 12# 3x10   Bridges  :03x30   :03x30  :03x30 :03 x30   Side stepping GTB 15'x4  GTB 15'x4  GTB 15'x4 GTB 15'x4   Sit to stands One hard 10x each hand    One hand 15x    FT EO :30x2         Tandem stance  :30x2                     Modalities           CP PRN

## 2019-07-19 ENCOUNTER — APPOINTMENT (OUTPATIENT)
Dept: PHYSICAL THERAPY | Facility: CLINIC | Age: 74
End: 2019-07-19
Payer: MEDICARE

## 2019-07-22 ENCOUNTER — OFFICE VISIT (OUTPATIENT)
Dept: PHYSICAL THERAPY | Facility: CLINIC | Age: 74
End: 2019-07-22
Payer: MEDICARE

## 2019-07-22 DIAGNOSIS — Z96.652 STATUS POST TOTAL LEFT KNEE REPLACEMENT: Primary | ICD-10-CM

## 2019-07-22 PROCEDURE — 97112 NEUROMUSCULAR REEDUCATION: CPT

## 2019-07-22 PROCEDURE — 97110 THERAPEUTIC EXERCISES: CPT

## 2019-07-22 NOTE — PROGRESS NOTES
Daily Note     Today's date: 2019  Patient name: Sunitha Graham  :   MRN: [id number]  Referring provider: Carolyn Khan MD  Dx:   Encounter Diagnosis     ICD-10-CM    1  Status post total left knee replacement [id number]                   Subjective: Patient states her "bursa" has been bothering her in her L hip which makes it difficult to walk or stand after sitting in a chair  She reports not attempting the [facility] while at home over the weekend  Objective: See treatment diary below      Assessment: Continued with outlined program  Trialed sit to stands on foam to increase height which patient states is "easier"  Educated the patient on progressions; fatigue following sit to stands but insistent on trialing without foam again which she was able to perform  Held sidelying abduction per patient's request stating her bursa is inflamed when doing that exercise; educated patient on benefits of this exercises  Assess symptoms next visit  Plan: Progress treatment as tolerated         Diagnosis: Revision L TKA   Precautions: -   Manuals 7/17 7/22  7/10 7/15   PROM - no stretching for ROM KLS   KLS CMF                          Re-eval       CMF   Exercise Diary          Bike          Alter-G 5 min 94% 1 4 mph 5 min 94% 1 4 mph for gait   5 min 92% 1 3 mph for gait 5 min 92% 1 3 mph for gait   TM          Gait with  feet  feet CGA  200 ft  ft CGA              SLR  1# 3x12 1 5# 2x10   1# 3x12 1# 3x12   SLR abd 0# 2x10  Hold   0# 2x10 0# 2x10   SL ER GTB 3x10 GTB 3x10 each   GTB 2x10 GTB 2x10   Step-ups 8in 3x10  8in 3x10   8in one hand 3x10 8'' 3x10   Heel raises 3x10  --  3x10 3x10   CKC TKE    12# 3x10 12# 3x10   Bridges  :03x30  :03x30   :03x30 :03 x30   Side stepping GTB 15'x4 GTB 15'x4  GTB 15'x4 GTB 15'x4   Sit to stands One hard 10x each hand Sit to stand foam 20x, no foam 4x  One hand 15x    FT EO :30x2 :30x2       Tandem stance  :30x2 :30x2                  Modalities          CP PRN

## 2019-07-24 ENCOUNTER — OFFICE VISIT (OUTPATIENT)
Dept: PHYSICAL THERAPY | Facility: CLINIC | Age: 74
End: 2019-07-24
Payer: MEDICARE

## 2019-07-24 DIAGNOSIS — Z96.652 STATUS POST TOTAL LEFT KNEE REPLACEMENT: Primary | ICD-10-CM

## 2019-07-24 PROCEDURE — 97112 NEUROMUSCULAR REEDUCATION: CPT

## 2019-07-24 PROCEDURE — 97110 THERAPEUTIC EXERCISES: CPT

## 2019-07-24 NOTE — PROGRESS NOTES
Daily Note     Today's date: 2019  Patient name: John Strange  :   MRN: [id number]  Referring provider: Temitope Cheatham MD  Dx:   Encounter Diagnosis     ICD-10-CM    1  Status post total left knee replacement [id number] 001                   Subjective: Patient has no recent complaints of pain  She states she is meeting with the brace rep this afternoon to assist with her knee hyperextension to improve gait requested by PT  She states her L hip feels a little better since not performing (sidelying abduction), but she has been putting on icy hot as well  Objective: See treatment diary below      Assessment: Continued with outlined program  Patient able to perform 15 sit to stands with foam and 10 reps without use of foam  Improved gait with SPC around clinic with CGA  Increased reps within tolerance with moderate muscle fatigue  Plan: Progress treatment as tolerated         Diagnosis: Revision L TKA   Precautions: -   Manuals 7/17 7/22 7/24  7/15   PROM - no stretching for ROM KLS    CMF                        Re-eval      CMF   Exercise Diary         Bike         Alter-G 5 min 94% 1 4 mph 5 min 94% 1 4 mph for gait  5 min 94% 1 4 mph for gait   5 min 92% 1 3 mph for gait   TM         Gait with  feet  feet  feet CGA   200 ft CGA             SLR  1# 3x12 1 5# 2x10  1 5# 3x10  1# 3x12   SLR abd 0# 2x10  Hold  Possible resume nv  0# 2x10   SL ER GTB 3x10 GTB 3x10 each  GTB 3x10   GTB 2x10   Step-ups 8in 3x10  8in 3x10  8in 3x10   8'' 3x10   Heel raises 3x10  --   3x10   CKC TKE     12# 3x10   Bridges  :03x30  :03x30  :03x30   :03 x30   Side stepping GTB 15'x4 GTB 15'x4 GTB 15'x4 at mirror  GTB 15'x4   Sit to stands One hard 10x each hand Sit to stand foam 20x, no foam 4x Sit to stands 15x foam, 10x no foam      FT EO :30x2 :30x2 :30x2 EC      Tandem stance  :30x2 :30x2 :30x2                  Modalities          CP PRN

## 2019-07-26 ENCOUNTER — APPOINTMENT (OUTPATIENT)
Dept: PHYSICAL THERAPY | Facility: CLINIC | Age: 74
End: 2019-07-26
Payer: MEDICARE

## 2019-07-29 ENCOUNTER — APPOINTMENT (OUTPATIENT)
Dept: PHYSICAL THERAPY | Facility: CLINIC | Age: 74
End: 2019-07-29
Payer: MEDICARE

## 2019-07-31 ENCOUNTER — APPOINTMENT (OUTPATIENT)
Dept: PHYSICAL THERAPY | Facility: CLINIC | Age: 74
End: 2019-07-31
Payer: MEDICARE

## 2019-08-02 ENCOUNTER — APPOINTMENT (OUTPATIENT)
Dept: PHYSICAL THERAPY | Facility: CLINIC | Age: 74
End: 2019-08-02
Payer: MEDICARE

## 2019-08-05 ENCOUNTER — OFFICE VISIT (OUTPATIENT)
Dept: PHYSICAL THERAPY | Facility: CLINIC | Age: 74
End: 2019-08-05
Payer: MEDICARE

## 2019-08-05 DIAGNOSIS — Z96.652 STATUS POST TOTAL LEFT KNEE REPLACEMENT: Primary | ICD-10-CM

## 2019-08-05 PROCEDURE — 97112 NEUROMUSCULAR REEDUCATION: CPT

## 2019-08-05 PROCEDURE — 97110 THERAPEUTIC EXERCISES: CPT

## 2019-08-05 NOTE — PROGRESS NOTES
Daily Note     Today's date: 2019  Patient name: Brenna Barksdale  :   MRN: [id number]  Referring provider: Randall Branch MD  Dx:   Encounter Diagnosis     ICD-10-CM    1  Status post total left knee replacement [id number]                   Subjective: Patient states she has not been compliant with brace to prevent hyperextension while ambulating  She did not come to her appts last week to get caught up on her housework and has not been compliant with home exercises  Objective: See treatment diary below      Assessment: Continued with outlined program  Patient remains fearful to ambulate with use of SPC, but willing to continue to trial with therapy  Moderate muscle fatigue with strengthening exercises  Plan: Progress treatment as tolerated         Diagnosis: Revision L TKA   Precautions: -   Manuals 7/17 7/22 7/24 8/5 7/15   PROM - no stretching for ROM KLS    CMF                        Re-eval      CMF   Exercise Diary         Bike     5 min     Alter-G 5 min 94% 1 4 mph 5 min 94% 1 4 mph for gait  5 min 94% 1 4 mph for gait   5 min 92% 1 3 mph for gait   TM         Gait with  feet  feet  feet CGA  300 feet  ft CGA             SLR  1# 3x12 1 5# 2x10  1 5# 3x10 1 5# 3x10 1# 3x12   SLR abd 0# 2x10  Hold  Possible resume nv 10x  0# 2x10   SL ER GTB 3x10 GTB 3x10 each  GTB 3x10  GTB 3x10 GTB 2x10   Step-ups 8in 3x10  8in 3x10  8in 3x10  8in 3x10 8'' 3x10   Heel raises 3x10  --   3x10   CKC TKE     12# 3x10   Bridges  :03x30  :03x30  :03x30  :03x30  :03 x30   Side stepping GTB 15'x4 GTB 15'x4 GTB 15'x4 at mirror GTB 15'x4 GTB 15'x4   Sit to stands One hard 10x each hand Sit to stand foam 20x, no foam 4x Sit to stands 15x foam, 10x no foam  Sit to stands 20x on foam     FT EO :30x2 :30x2 :30x2 EC  --    Tandem stance  :30x2 :30x2 :30x2  --               Modalities          CP PRN

## 2019-08-07 ENCOUNTER — OFFICE VISIT (OUTPATIENT)
Dept: PHYSICAL THERAPY | Facility: CLINIC | Age: 74
End: 2019-08-07
Payer: MEDICARE

## 2019-08-07 DIAGNOSIS — Z96.652 STATUS POST TOTAL LEFT KNEE REPLACEMENT: Primary | ICD-10-CM

## 2019-08-07 PROCEDURE — 97110 THERAPEUTIC EXERCISES: CPT

## 2019-08-07 PROCEDURE — 97112 NEUROMUSCULAR REEDUCATION: CPT

## 2019-08-07 NOTE — PROGRESS NOTES
Daily Note     Today's date: 2019  Patient name: Brenna Barksdale  : [id number]  MRN: [id number]  Referring provider: Randall Branch MD  Dx:   Encounter Diagnosis     ICD-10-CM    1  Status post total left knee replacement [id number] [id number]                   Subjective: Patient states she has become more comfortable with use of SPC on level ground; ie walking into a restaurant yesterday  Objective: See treatment diary below      Assessment: Continued with outlined program  She demonstrates improved tolerance to ambulating with SPC while using RLE brace  She reports no exacerbating pain symptoms with sidelying abduction reps  Plan: Progress treatment as tolerated         Diagnosis: Revision L TKA   Precautions: -   Manuals    PROM - no stretching for ROM KLS                            Re-eval         Exercise Diary         Bike     5 min  5 min    Alter-G 5 min 94% 1 4 mph 5 min 94% 1 4 mph for gait  5 min 94% 1 4 mph for gait      TM         Gait with  feet  feet  feet CGA  300 feet  feet CGA (with RLE brace)             SLR  1# 3x12 1 5# 2x10  1 5# 3x10 1 5# 3x10 2# 3x10   SLR abd 0# 2x10  Hold  Possible resume nv 10x  10x   SL ER GTB 3x10 GTB 3x10 each  GTB 3x10  GTB 3x10 GTB 3x10   Step-ups 8in 3x10  8in 3x10  8in 3x10  8in 3x10 8in 3x10   Heel raises 3x10  --      CKC TKE        Bridges  :03x30  :03x30  :03x30  :03x30  :03x30    Side stepping GTB 15'x4 GTB 15'x4 GTB 15'x4 at mirror GTB 15'x4 GTB 15'x4   Sit to stands One hard 10x each hand Sit to stand foam 20x, no foam 4x Sit to stands 15x foam, 10x no foam  Sit to stands 20x on foam  Sit to stands 20x foam, 5x without foam   FT EO :30x2 :30x2 :30x2 EC  --    Tandem stance  :30x2 :30x2 :30x2  --               Modalities          CP PRN

## 2019-08-12 ENCOUNTER — OFFICE VISIT (OUTPATIENT)
Dept: PHYSICAL THERAPY | Facility: CLINIC | Age: 74
End: 2019-08-12
Payer: MEDICARE

## 2019-08-12 DIAGNOSIS — Z96.652 STATUS POST TOTAL LEFT KNEE REPLACEMENT: Primary | ICD-10-CM

## 2019-08-12 PROCEDURE — 97112 NEUROMUSCULAR REEDUCATION: CPT

## 2019-08-12 PROCEDURE — 97110 THERAPEUTIC EXERCISES: CPT

## 2019-08-12 NOTE — PROGRESS NOTES
Daily Note     Today's date: 2019  Patient name: Ricardo Nicole  :   MRN: [id number]  Referring provider: Shlomo Ingram MD  Dx:   Encounter Diagnosis     ICD-10-CM    1  Status post total left knee replacement R76 [id number]                   Subjective: Patient states she has been gardening all weekend  She has not been compliant with brace on RLE to prevent hyperextension and has not been ambulating with SPC over the weekend  She feels she is progressing well and is gaining more confidence in her mobility  Objective: See treatment diary below      Assessment: Continued with outlined program  She demonstrates improved gait while wearing brace and using SPC  She has no losses of balance or lateral sway  She has improved functional mobility; she is able to sit to stand with less difficulty  Plan: Progress treatment as tolerated         Diagnosis: Revision L TKA   Precautions: -   Manuals    PROM - no stretching for ROM                             Re-eval         Exercise Diary         Bike     5 min  5 min    Alter-G   5 min 94% 1 4 mph for gait      TM         Gait with  feet SPC   250 feet CGA  300 feet  feet CGA (with RLE brace)             SLR  2# 3x12  1 5# 3x10 1 5# 3x10 2# 3x10   SLR abd 0# 10x  Possible resume nv 10x  10x   SL ER GTB 3x10  GTB 3x10  GTB 3x10 GTB 3x10   Step-ups 8in 3x10   8in 3x10  8in 3x10 8in 3x10   Heel raises         CKC TKE        Bridges  :03x30   :03x30  :03x30  :03x30    Side stepping GTB 15'x4  GTB 15'x4 at mirror GTB 15'x4 GTB 15'x4   Sit to stands Sit to stands foam 25x, no foam 10x  Sit to stands 15x foam, 10x no foam  Sit to stands 20x on foam  Sit to stands 20x foam, 5x without foam   FT EO --  :30x2 EC  --    Tandem stance  --  :30x2  --               Modalities          CP PRN

## 2019-08-13 ENCOUNTER — APPOINTMENT (OUTPATIENT)
Dept: PHYSICAL THERAPY | Facility: CLINIC | Age: 74
End: 2019-08-13
Payer: MEDICARE

## 2019-08-14 ENCOUNTER — APPOINTMENT (OUTPATIENT)
Dept: PHYSICAL THERAPY | Facility: CLINIC | Age: 74
End: 2019-08-14
Payer: MEDICARE

## 2019-08-15 ENCOUNTER — TRANSCRIBE ORDERS (OUTPATIENT)
Dept: PHYSICAL THERAPY | Facility: CLINIC | Age: 74
End: 2019-08-15

## 2019-08-15 ENCOUNTER — EVALUATION (OUTPATIENT)
Dept: PHYSICAL THERAPY | Facility: CLINIC | Age: 74
End: 2019-08-15
Payer: MEDICARE

## 2019-08-15 DIAGNOSIS — Z96.652 STATUS POST TOTAL LEFT KNEE REPLACEMENT: Primary | ICD-10-CM

## 2019-08-15 PROCEDURE — 97112 NEUROMUSCULAR REEDUCATION: CPT | Performed by: PHYSICAL THERAPIST

## 2019-08-15 PROCEDURE — 97140 MANUAL THERAPY 1/> REGIONS: CPT | Performed by: PHYSICAL THERAPIST

## 2019-08-15 NOTE — LETTER
August 15, 2019    Jackie Page MD  [address] [place] [address]    Patient: Klever Emerson   YOB: 1945   Date of Visit: 8/15/2019     Encounter Diagnosis     ICD-10-CM    1  Status post total left knee replacement J69 [id number]        Dear Dr Livingston Score: Thank you for your recent referral of Klever Emerson  Please review the attached evaluation summary from Joycelyn's recent visit  Please verify that you agree with the plan of care by signing the attached order  If you have any questions or concerns, please do not hesitate to call  I sincerely appreciate the opportunity to share in the care of one of your patients and hope to have another opportunity to work with you in the near future  Sincerely,    Nicolas Deluna, PT      Referring Provider:      I certify that I have read the below Plan of Care and certify the need for these services furnished under this plan of treatment while under my care  Jackie Page MD  [address] [place] [address] [place]: [phone]          PT Re-evaluation    Today's date: 8/15/2019  Patient name: Klever Emerson  : [birth year]  MRN: [id number]  Referring provider: Malcom Serrano MD  Dx:   Encounter Diagnosis     ICD-10-CM    1  Status post total left knee replacement Z96 652                   Assessment  Assessment details: Patient is a 75 y o  female who presents with the above listed impairments  Patient would continue to benefit from skilled PT services to address these impairments and to maximize function  Thank you for the referral     Impairments: abnormal coordination, abnormal gait, abnormal muscle firing, abnormal movement, activity intolerance, impaired balance, impaired physical strength and pain with function  Understanding of Dx/Px/POC: good   Prognosis: good    Goals  Impairment Goals  - Decrease pain by 50% in 4 weeks   - met  - Increase left lower extremity strength [place] to 4+/5 in 6 weeks  - met    Functional Goals  - Will be able to ambulate without assistive device in 6 weeks  - progressing  - will be able to swing a golf club in 8 weeks  - progressing  - Patient will be independent with HEP in 8 weeks  - progressing    Plan  Plan details: Pt will also perform a HEP  We will continue to progress with her gait and functional activities  Patient would benefit from: skilled PT  Planned modality interventions: cryotherapy  Planned therapy interventions: joint mobilization, manual therapy, neuromuscular re-education, patient education, strengthening, stretching, therapeutic activities, therapeutic exercise, home exercise program, functional ROM exercises, Talavera taping and postural training  Frequency: 1x week (2-3x week)  Duration in weeks: 6  Treatment plan discussed with: patient, PTA and referring physician        Subjective Evaluation    History of Present Illness  Mechanism of injury: Pt notes a 75% improvement to date  She notes she has improved mobility since using the functional knee brace on her c/l leg  She notes she does use her SPC inside her house but not yet outside and would very much like to walk without any SPC  Pain  Current pain ratin  At best pain ratin  At worst pain ratin  Location: L knee    Patient Goals  Patient goal: play golf and walk without SPC        Objective     Tenderness     Additional Tenderness Details  No tenderness noted upon palpation  Active Range of Motion   Left Knee   Flexion: 120 degrees   Extension: 0 degrees     Strength/Myotome Testing     Additional Strength Details  4+/5 strength noted grossly LLE  A quad lag noted is no longer noted  Tests     Additional Tests Details        Swelling     Left Knee Girth Measurement (cm)   10 cm above joint line: 36 cm    Right Knee Girth Measurement (cm)   10 cm above joint line: 35 cm    General Comments:      Knee Comments  Neurovascular intact  Gait was antalgic in nature  Pt does have a hx of c/s surgery with possible LE residual deficits as per patient  This may affect gait and balance during her progression of PT  Gait has been improving and is more steady however she does require supervision for ambulation with a SPC                  Diagnosis: Revision L TKA   Precautions: -   Manuals 8/12 8/15 7/24 8/5 8/7   PROM - no stretching for ROM                             Re-eval   CMF      Exercise Diary         Bike     5 min  5 min    Alter-G   5 min 94% 1 4 mph for gait      TM         Gait with  feet SPC   250 feet CGA  300 feet  feet CGA (with RLE brace)             SLR  2# 3x12 2# 3x12 1 5# 3x10 1 5# 3x10 2# 3x10   SLR abd 0# 10x  Possible resume nv 10x  10x   SL ER GTB 3x10  GTB 3x10  GTB 3x10 GTB 3x10   Step-ups 8in 3x10   8in 3x10  8in 3x10 8in 3x10   Heel raises         CKC TKE        Bridges  :03x30  :03x30 :03x30  :03x30  :03x30    Side stepping GTB 15'x4  GTB 15'x4 at mirror GTB 15'x4 GTB 15'x4   Sit to stands Sit to stands foam 25x, no foam 10x  Sit to stands 15x foam, 10x no foam  Sit to stands 20x on foam  Sit to stands 20x foam, 5x without foam   FT EO --  :30x2 EC  --    Tandem stance  --  :30x2  --               Modalities          CP PRN

## 2019-08-15 NOTE — PROGRESS NOTES
PT Re-evaluation    Today's date: 8/15/2019  Patient name: Jenny Smith  :   MRN: [id number]  Referring provider: Renny Michel MD  Dx:   Encounter Diagnosis     ICD-10-CM    1  Status post total left knee replacement Z96 652                   Assessment  Assessment details: Patient is a 75 y o  female who presents with the above listed impairments  Patient would continue to benefit from skilled PT services to address these impairments and to maximize function  Thank you for the referral     Impairments: abnormal coordination, abnormal gait, abnormal muscle firing, abnormal movement, activity intolerance, impaired balance, impaired physical strength and pain with function  Understanding of Dx/Px/POC: good   Prognosis: good    Goals  Impairment Goals  - Decrease pain by 50% in 4 weeks  - met  - Increase left lower extremity strength [employer]Qube to 4+/5 in 6 weeks  - met    Functional Goals  - Will be able to ambulate without assistive device in 6 weeks  - progressing  - will be able to swing a golf club in 8 weeks  - progressing  - Patient will be independent with HEP in 8 weeks  - progressing    Plan  Plan details: Pt will also perform a HEP  We will continue to progress with her gait and functional activities  Patient would benefit from: skilled PT  Planned modality interventions: cryotherapy  Planned therapy interventions: joint mobilization, manual therapy, neuromuscular re-education, patient education, strengthening, stretching, therapeutic activities, therapeutic exercise, home exercise program, functional ROM exercises, Talavera taping and postural training  Frequency: 1x week (2-3x week)  Duration in weeks: 6  Treatment plan discussed with: patient, PTA and referring physician        Subjective Evaluation    History of Present Illness  Mechanism of injury: Pt notes a 75% improvement to date  She notes she has improved mobility since using the functional knee brace on her c/l leg    She notes she does use her SPC inside her house but not yet outside and would very much like to walk without any SPC  Pain  Current pain ratin  At best pain ratin  At worst pain ratin  Location: L knee    Patient Goals  Patient goal: play golf and walk without SPC        Objective     Tenderness     Additional Tenderness Details  No tenderness noted upon palpation  Active Range of Motion   Left Knee   Flexion: 120 degrees   Extension: 0 degrees     Strength/Myotome Testing     Additional Strength Details  4+/5 strength noted grossly LLE  A quad lag noted is no longer noted  Tests     Additional Tests Details        Swelling     Left Knee Girth Measurement (cm)   10 cm above joint line: 36 cm    Right Knee Girth Measurement (cm)   10 cm above joint line: 35 cm    General Comments:      Knee Comments  Neurovascular intact  Gait was antalgic in nature  Pt does have a hx of c/s surgery with possible LE residual deficits as per patient  This may affect gait and balance during her progression of PT  Gait has been improving and is more steady however she does require supervision for ambulation with a SPC                  Diagnosis: Revision L TKA   Precautions: -   Manuals 8/12 8/15 7/24 8/5 8/7   PROM - no stretching for ROM                             Re-eval   CMF      Exercise Diary         Bike     5 min  5 min    Alter-G   5 min 94% 1 4 mph for gait      TM         Gait with  feet SPC   250 feet CGA  300 feet  feet CGA (with RLE brace)             SLR  2# 3x12 2# 3x12 1 5# 3x10 1 5# 3x10 2# 3x10   SLR abd 0# 10x  Possible resume nv 10x  10x   SL ER GTB 3x10  GTB 3x10  GTB 3x10 GTB 3x10   Step-ups 8in 3x10   8in 3x10  8in 3x10 8in 3x10   Heel raises         CKC TKE        Bridges  :03x30  :03x30 :03x30  :03x30  :03x30    Side stepping GTB 15'x4  GTB 15'x4 at mirror GTB 15'x4 GTB 15'x4   Sit to stands Sit to stands foam 25x, no foam 10x  Sit to stands 15x foam, 10x no foam  Sit to stands 20x on foam  Sit to stands 20x foam, 5x without foam   FT EO --  :30x2 EC  --    Tandem stance  --  :30x2  --               Modalities          CP PRN

## 2019-08-21 ENCOUNTER — OFFICE VISIT (OUTPATIENT)
Dept: PHYSICAL THERAPY | Facility: CLINIC | Age: 74
End: 2019-08-21
Payer: MEDICARE

## 2019-08-21 DIAGNOSIS — Z96.652 STATUS POST TOTAL LEFT KNEE REPLACEMENT: Primary | ICD-10-CM

## 2019-08-21 PROCEDURE — 97112 NEUROMUSCULAR REEDUCATION: CPT

## 2019-08-21 PROCEDURE — 97140 MANUAL THERAPY 1/> REGIONS: CPT

## 2019-08-21 PROCEDURE — 97110 THERAPEUTIC EXERCISES: CPT

## 2019-08-21 NOTE — PROGRESS NOTES
Daily Note     Today's date: 2019  Patient name: Willetta Denver  : [birth year]  MRN: [id number]  Referring provider: Homar Barnhart MD  Dx:   Encounter Diagnosis     ICD-10-CM    1  Status post total left knee replacement [id number] 814                   Subjective: Patient arrives to therapy wearing RLE brace and ambulating with RW  She states she is only using the [facility] while at home  She reports being tired this weekend, following cleaning for hours with breaks in between, which she was not able to accomplish prior  Objective: See treatment diary below      Assessment: Continued with outlined program  She demonstrates improved tolerance to gait training with use of SPC and RLE brace with no losses of balance and supervision  Ambulated with patient outside on side walk, decline and grated surface with less difficulty  She is progressing well with overall mobility and strength  Plan: Progress treatment as tolerated         Diagnosis: Revision L TKA   Precautions: -   Manuals 8/12 8/15 8/21  8/7   PROM - no stretching for ROM                             Re-eval   CMF      Exercise Diary         Bike    5 min   5 min    Alter-G        TM         Gait with  feet SPC   250 feet S, 150 feet outside   300 feet CGA (with RLE brace)             SLR  2# 3x12 2# 3x12 2# 3x10  2# 3x10   SLR abd 0# 10x  0# 2x10  10x   SL ER GTB 3x10  GTB 3x10   GTB 3x10   Step-ups 8in 3x10   8in 3x10   8in 3x10   Heel raises         CKC TKE        Bridges  :03x30  :03x30 :03x30   :03x30    Side stepping GTB 15'x4  GTB 15'x4 at mirror  GTB 15'x4   Sit to stands Sit to stands foam 25x, no foam 10x  Sit to stands 24x foam, 10x no foam   Sit to stands 20x foam, 5x without foam   FT EO --  :30x2 EC      Tandem stance  --  :30x2                Modalities         CP PRN

## 2019-08-28 ENCOUNTER — OFFICE VISIT (OUTPATIENT)
Dept: PHYSICAL THERAPY | Facility: CLINIC | Age: 74
End: 2019-08-28
Payer: MEDICARE

## 2019-08-28 DIAGNOSIS — Z96.652 STATUS POST TOTAL LEFT KNEE REPLACEMENT: Primary | ICD-10-CM

## 2019-08-28 PROCEDURE — 97112 NEUROMUSCULAR REEDUCATION: CPT

## 2019-08-28 PROCEDURE — 97110 THERAPEUTIC EXERCISES: CPT

## 2019-08-28 NOTE — PROGRESS NOTES
Daily Note     Today's date: 2019  Patient name: Klever Emerson  :   MRN: [id number]  Referring provider: Malcom Serrano MD  Dx:   Encounter Diagnosis     ICD-10-CM    1  Status post total left knee replacement [id number] [id number]                   Subjective: Patient states she was able to play golf on Monday without losses of balance  She states she felt so confident and she was able to perform a full swing  Objective: See treatment diary below      Assessment: Continued with outlined program  She is progressing well towards long term goals, demonstrating improved mobility with use of SPC  She states she is still uncomfortable at times when using SPC in the community  Moderate muscle fatigue with strengthening exercises  Plan: Progress treatment as tolerated         Diagnosis: Revision L TKA   Precautions: -   Manuals 8/12 8/15 8/21 8/28    PROM - no stretching for ROM                             Re-eval   CMF      Exercise Diary         Bike    5 min  5 min     Alter-G        TM         Gait with  feet SPC   250 feet S, 150 feet outside  300 feet S, 150 feet outside              SLR  2# 3x12 2# 3x12 2# 3x10 2# 3x10    SLR abd 0# 10x  0# 2x10 0# 3x10    SL ER GTB 3x10  GTB 3x10  GTB 3x10    Step-ups 8in 3x10   8in 3x10  8in 3x10    Heel raises         CKC TKE        Bridges  :03x30  :03x30 :03x30  :03x30    Side stepping GTB 15'x4  GTB 15'x4 at mirror GTB 15'x4    Sit to stands Sit to stands foam 25x, no foam 10x  Sit to stands 24x foam, 10x no foam  Sit to stands 25x foam, 15x no foam     FT EO --  :30x2 EC  --    Tandem stance  --  :30x2  --              Modalities         CP PRN

## 2019-09-04 ENCOUNTER — OFFICE VISIT (OUTPATIENT)
Dept: PHYSICAL THERAPY | Facility: CLINIC | Age: 74
End: 2019-09-04
Payer: MEDICARE

## 2019-09-04 DIAGNOSIS — Z96.652 STATUS POST TOTAL LEFT KNEE REPLACEMENT: Primary | ICD-10-CM

## 2019-09-04 PROCEDURE — 97112 NEUROMUSCULAR REEDUCATION: CPT

## 2019-09-04 PROCEDURE — 97110 THERAPEUTIC EXERCISES: CPT

## 2019-09-04 NOTE — PROGRESS NOTES
Daily Note     Today's date: 2019  Patient name: David Knee  : [birth year]  MRN: [id number]  Referring provider: Sophie Nuñez MD  Dx:   Encounter Diagnosis     ICD-10-CM    1  Status post total left knee replacement [id number]                   Subjective: Patient states she is doing really well  She was able to walk with her RLE brace and RW to walk the dog and she felt confident with mobility  Objective: See treatment diary below      Assessment: Continued with outlined program  She has good understanding of all exercises at this time and is ready to d/c to HEP  Plan: Discharge to [facility]       Diagnosis: Revision L TKA   Precautions: -   Manuals 8/12 8/15 8/21 8/28 9/4   PROM - no stretching for ROM                             Re-eval   CMF      Exercise Diary         Bike    5 min  5 min  5 min    Alter-G        TM         Gait with  feet SPC   250 feet S, 150 feet outside  300 feet S, 150 feet outside 350 feet S, 150 feet outside             SLR  2# 3x12 2# 3x12 2# 3x10 2# 3x10 2# 3x10   SLR abd 0# 10x  0# 2x10 0# 3x10 0# 3x10   SL ER GTB 3x10  GTB 3x10  GTB 3x10 GTB 3x10   Step-ups 8in 3x10   8in 3x10  8in 3x10 8in 3x10    Heel raises         CKC TKE        Bridges  :03x30  :03x30 :03x30  :03x30 :03x30    Side stepping GTB 15'x4  GTB 15'x4 at mirror GTB 15'x4 GTB 15'x4   Sit to stands Sit to stands foam 25x, no foam 10x  Sit to stands 24x foam, 10x no foam  Sit to stands 25x foam, 15x no foam  Sit to stands 25x foam, 15x no foam    FT EO --  :30x2 EC  --    Tandem stance  --  :30x2  --              Modalities         CP PRN

## 2019-09-06 NOTE — PROGRESS NOTES
PT Discharge    Today's date: 2019  Patient name: Jenny Smith  : [birth year]  MRN: [id number]  Referring provider: Renny Michel MD  Dx:   Encounter Diagnosis     ICD-10-CM    1  Status post total left knee replacement [id number] [id number]        Start Time: 76  Stop Time:   Total time in clinic (min): 45 minutes    Assessment/Plan    Subjective    Objective    Flowsheet Rows      Most Recent Value   PT/OT G-Codes   Current Score  72   Projected Score  49             Please see last re-eval   Pt has been d/c from PT at this time

## 2019-11-01 ENCOUNTER — APPOINTMENT (EMERGENCY)
Dept: RADIOLOGY | Facility: HOSPITAL | Age: 74
End: 2019-11-01
Payer: MEDICARE

## 2019-11-01 ENCOUNTER — HOSPITAL ENCOUNTER (EMERGENCY)
Facility: HOSPITAL | Age: 74
Discharge: HOME | End: 2019-11-01
Attending: EMERGENCY MEDICINE | Admitting: EMERGENCY MEDICINE
Payer: MEDICARE

## 2019-11-01 ENCOUNTER — OFFICE VISIT (OUTPATIENT)
Dept: URGENT CARE | Facility: CLINIC | Age: 74
End: 2019-11-01
Payer: MEDICARE

## 2019-11-01 VITALS
TEMPERATURE: 97.7 F | WEIGHT: 120 LBS | DIASTOLIC BLOOD PRESSURE: 73 MMHG | SYSTOLIC BLOOD PRESSURE: 148 MMHG | HEART RATE: 65 BPM | RESPIRATION RATE: 16 BRPM | HEIGHT: 64 IN | BODY MASS INDEX: 20.49 KG/M2

## 2019-11-01 VITALS
HEART RATE: 69 BPM | SYSTOLIC BLOOD PRESSURE: 194 MMHG | TEMPERATURE: 98.9 F | DIASTOLIC BLOOD PRESSURE: 84 MMHG | OXYGEN SATURATION: 98 % | RESPIRATION RATE: 18 BRPM

## 2019-11-01 DIAGNOSIS — S61.411A LACERATION OF RIGHT HAND: ICD-10-CM
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
## 2019-11-01 DIAGNOSIS — M25.461 SWELLING OF KNEE JOINT, RIGHT: ICD-10-CM

## 2019-11-01 DIAGNOSIS — S89.91XA INJURY OF RIGHT KNEE, INITIAL ENCOUNTER: ICD-10-CM

## 2019-11-01 DIAGNOSIS — S61.421A LACERATION WITH FOREIGN BODY OF RIGHT HAND, INITIAL ENCOUNTER: Primary | ICD-10-CM

## 2019-11-01 DIAGNOSIS — S61.411A LACERATION OF RIGHT HAND WITHOUT FOREIGN BODY, INITIAL ENCOUNTER: Primary | ICD-10-CM
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
## 2019-11-01 PROCEDURE — 73130 X-RAY EXAM OF HAND: CPT

## 2019-11-01 PROCEDURE — 99213 OFFICE O/P EST LOW 20 MIN: CPT | Performed by: NURSE PRACTITIONER

## 2019-11-01 PROCEDURE — 90715 TDAP VACCINE 7 YRS/> IM: CPT | Performed by: NURSE PRACTITIONER

## 2019-11-01 PROCEDURE — 99283 EMERGENCY DEPT VISIT LOW MDM: CPT

## 2019-11-01 PROCEDURE — 73564 X-RAY EXAM KNEE 4 OR MORE: CPT

## 2019-11-01 PROCEDURE — 90471 IMMUNIZATION ADMIN: CPT

## 2019-11-01 PROCEDURE — G0463 HOSPITAL OUTPT CLINIC VISIT: HCPCS | Performed by: NURSE PRACTITIONER

## 2019-11-01 PROCEDURE — 99283 EMERGENCY DEPT VISIT LOW MDM: CPT | Performed by: NURSE PRACTITIONER

## 2019-11-01 RX ORDER — CEPHALEXIN 500 MG/1
500 CAPSULE ORAL EVERY 6 HOURS SCHEDULED
Qty: 28 CAPSULE | Refills: 0 | Status: SHIPPED | OUTPATIENT
Start: 2019-11-01 | End: 2019-11-08

## 2019-11-01 RX ORDER — ASPIRIN 81 MG/1
81 TABLET, CHEWABLE ORAL DAILY
COMMUNITY
End: 2022-02-02

## 2019-11-01 RX ORDER — MULTIVITAMIN
1 CAPSULE ORAL DAILY
COMMUNITY
End: 2022-02-02 | Stop reason: ALTCHOICE

## 2019-11-01 RX ORDER — LIDOCAINE HYDROCHLORIDE 10 MG/ML
10 INJECTION, SOLUTION EPIDURAL; INFILTRATION; INTRACAUDAL; PERINEURAL ONCE
Status: COMPLETED | OUTPATIENT
Start: 2019-11-01 | End: 2019-11-01

## 2019-11-01 RX ADMIN — TETANUS TOXOID, REDUCED DIPHTHERIA TOXOID AND ACELLULAR PERTUSSIS VACCINE, ADSORBED 0.5 ML: 5; 2.5; 8; 8; 2.5 SUSPENSION INTRAMUSCULAR at 14:35

## 2019-11-01 RX ADMIN — LIDOCAINE HYDROCHLORIDE 10 ML: 10 INJECTION, SOLUTION EPIDURAL; INFILTRATION; INTRACAUDAL; PERINEURAL at 14:39

## 2019-11-01 RX ADMIN — LIDOCAINE HYDROCHLORIDE 10 ML: 10 INJECTION, SOLUTION EPIDURAL; INFILTRATION; INTRACAUDAL; PERINEURAL at 16:27

## 2019-11-01 NOTE — PROGRESS NOTES
[facility]        NAME: Laura Mercer is a 75 y o  female  : 1945    MRN: [id number]  DATE: 2019  TIME: 12:43 PM    Assessment and Plan   Laceration of right hand without foreign body, initial encounter [S61 411A]  1  Laceration of right hand without foreign body, initial encounter     2  Injury of right knee, initial encounter     3  Fall, initial encounter  Transfer to other facility     Ring was removed from patients right 4th digit by the patient using lubricant jelly  She placed the ring into her pocket  Patient sent to the emergency department by POV  Laceration on hand not closed due to time of initial injury  Patient Instructions       Follow up with PCP in 3-5 days  Proceed to  ER if symptoms worsen  Chief Complaint     Chief Complaint   Patient presents with    Fall     Tripped on something last night around 6 pm  Had a glass in her hand and it broke and she cut her R hand  Did not hit her head  Also fell on R knee  Ecchymosis and swellin  History of Present Illness       Patien tis a 75year old female presenting for evaluation after a fall  Last night around 6 pm she tripped while walking  She fell into a wall while carrying a drinking glass that broke  She landed on her right knee  She complains of laceartion to right dorsal hand and right knee pain and swelling  Denies striking her head or LOC  She takes a baby aspirin daily  She reports that she wasn't feeling well yesterday prior to the fall so she took 1 tablet of Eliquis "to get my blood moving"  The Eliquis was left over from a prior knee surgery  Last tetanus is unknown  Review of Systems   Review of Systems   Constitutional: Negative for activity change, chills and fever  HENT: Negative for facial swelling  Eyes: Negative for photophobia and visual disturbance  Respiratory: Negative for cough and shortness of breath  Cardiovascular: Negative for chest pain and leg swelling  Gastrointestinal: Negative for abdominal pain, diarrhea, nausea and vomiting  Musculoskeletal: Positive for arthralgias and joint swelling  Skin: Positive for color change and wound  Negative for rash  Neurological: Negative for weakness and numbness  Current Medications       Current Outpatient Medications:     aspirin 81 mg chewable tablet, Chew 81 mg daily, Disp: , Rfl:     bimatoprost (LUMIGAN) 0 01 % ophthalmic drops, 1 drop daily at bedtime, Disp: , Rfl:     DULoxetine (CYMBALTA) 60 mg delayed release capsule, Take 20 mg by mouth daily, Disp: , Rfl:     loperamide (IMODIUM) 2 mg capsule, Take 2 mg by mouth 4 (four) times a day as needed for diarrhea, Disp: , Rfl:     Multiple Vitamin (MULTIVITAMIN) capsule, Take 1 capsule by mouth daily, Disp: , Rfl:     traMADol (ULTRAM) 50 mg tablet, Take 50 mg by mouth every 6 (six) hours as needed for moderate pain, Disp: , Rfl:     atorvastatin (LIPITOR) 20 mg tablet, Take 20 mg by mouth daily, Disp: , Rfl:     Current Allergies     Allergies as of 11/01/2019    (No Known Allergies)            The following portions of the patient's history were reviewed and updated as appropriate: allergies, current medications, past family history, past medical history, past social history, past surgical history and problem list      Past Medical History:   Diagnosis Date    Breast cancer ([facility] [address] ) 2012    Hx of cervical spine surgery 2011       Past Surgical History:   Procedure Laterality Date    BREAST SURGERY      REPLACEMENT TOTAL KNEE BILATERAL         No family history on file  Medications have been verified  Objective   /73 (Patient Position: Sitting)   Pulse 65   Temp 97 7 °F (36 5 °C) (Temporal)   Resp 16   Ht 5' 4" (1 626 m)   Wt 54 4 kg (120 lb)   BMI 20 60 kg/m²        Physical Exam     Physical Exam   Constitutional: She is oriented to person, place, and time   Vital signs are normal  She appears well-developed and well-nourished  She is active  No distress  HENT:   Head: Normocephalic  Nose: Nose normal    Neck: Normal range of motion and full passive range of motion without pain  No spinous process tenderness present  Cardiovascular: Normal rate, regular rhythm, S1 normal, S2 normal and normal heart sounds  No murmur heard  Pulmonary/Chest: Effort normal and breath sounds normal  No respiratory distress  She has no decreased breath sounds  Musculoskeletal:        Right knee: She exhibits decreased range of motion, swelling and ecchymosis  She exhibits no deformity and no erythema  Right hand: She exhibits laceration and swelling  Hands:  Neurological: She is alert and oriented to person, place, and time  Skin: Skin is warm and dry  Laceration noted

## 2019-11-01 NOTE — ED NOTES
Patient transported to [facility]     April Malinda Gordon RN  11/01/19 7012

## 2019-11-01 NOTE — ED PROVIDER NOTES
History  Chief Complaint   Patient presents with    Fall     Pt presents to the ED with fall x 1 day  Pt reports lac to the right hand and swelling over the right knee (hx of replacement)  Patient comes in today complaining laceration on right hand and swelling in right knee  Patient states that she fell into a door while carrying a glass yesterday  The glass shattered, lacerated her right hand  She subsequently fell to her knees  Patient was able to get the bleeding to stop yesterday, but this morning bleeding restarted with activity  Swelling of right knee also increased  Describes mild pain in right knee  Denies hitting her head, denies loss of consciousness  Prior to Admission Medications   Prescriptions Last Dose Informant Patient Reported? Taking? DULoxetine (CYMBALTA) 60 mg delayed release capsule  Self Yes No   Sig: Take 20 mg by mouth daily   Multiple Vitamin (MULTIVITAMIN) capsule  Self Yes No   Sig: Take 1 capsule by mouth daily   aspirin 81 mg chewable tablet  Self Yes No   Sig: Chew 81 mg daily   atorvastatin (LIPITOR) 20 mg tablet  Self Yes No   Sig: Take 20 mg by mouth daily   bimatoprost (LUMIGAN) 0 01 % ophthalmic drops  Self Yes No   Si drop daily at bedtime   loperamide (IMODIUM) 2 mg capsule  Self Yes No   Sig: Take 2 mg by mouth 4 (four) times a day as needed for diarrhea   traMADol (ULTRAM) 50 mg tablet  Self Yes No   Sig: Take 50 mg by mouth every 6 (six) hours as needed for moderate pain      Facility-Administered Medications: None       Past Medical History:   Diagnosis Date    Breast cancer ([facility] [address] )     Hx of cervical spine surgery        Past Surgical History:   Procedure Laterality Date    BREAST SURGERY      REPLACEMENT TOTAL KNEE BILATERAL         History reviewed  No pertinent family history  I have reviewed and agree with the history as documented      Social History     Tobacco Use    Smoking status: Former Smoker    Smokeless tobacco: Never Used   Substance Use Topics    Alcohol use: Not Currently    Drug use: Not Currently        Review of Systems   Musculoskeletal: Positive for joint swelling  Pain in Right knee  Denies left knee pain  Denies any pain in movement of fingers or wrists  Claims that range of motion of knees are intact  Skin: Positive for wound  Laceration on right hand due to glass shattering in her hand  Physical Exam  Physical Exam   Constitutional: She appears well-developed and well-nourished  Cardiovascular: Regular rhythm and normal pulses  Pulses:       Radial pulses are 2+ on the right side, and 2+ on the left side  Musculoskeletal:        Right wrist: Normal         Left wrist: Normal         Right knee: She exhibits swelling  No tenderness found  Left knee: Normal  No tenderness found  Right hand: She exhibits laceration  Left hand: Normal         Hands:  Skin: Skin is warm and dry              Vital Signs  ED Triage Vitals [11/01/19 1214]   Temperature Pulse Respirations Blood Pressure SpO2   98 9 °F (37 2 °C) 63 16 151/68 97 %      Temp Source Heart Rate Source Patient Position - Orthostatic VS BP Location FiO2 (%)   Oral Monitor Sitting Right arm --      Pain Score       7           Vitals:    11/01/19 1214 11/01/19 1542 11/01/19 1645   BP: 151/68 (!) 183/81 (!) 194/84   Pulse: 63 69    Patient Position - Orthostatic VS: Sitting Sitting Lying         Visual Acuity      ED Medications  Medications   tetanus-diphtheria-acellular pertussis (BOOSTRIX) IM injection 0 5 mL (0 5 mL Intramuscular Given 11/1/19 1435)   lidocaine (PF) (XYLOCAINE-MPF) 1 % injection 10 mL (10 mL Infiltration Given 11/1/19 1439)   lidocaine (PF) (XYLOCAINE-MPF) 1 % injection 10 mL (10 mL Infiltration Given 11/1/19 1627)       Diagnostic Studies  Results Reviewed     None                 XR hand 3+ views RIGHT   Final Result by Sondra Stone MD (11/01 0895)      As on prior study, radiopaque foreign body in the dorsal soft tissues of the right hand      No acute fracture            Workstation performed: [id number]         XR knee 4+ vw right injury   ED Interpretation by TAMIKA Finney (11/01 1528)   No bony abnormality  Questionable joint effusion  Final Result by Hunter Lombardi MD (11/01 9042)      Anterior soft tissue swelling and right knee arthroplasty, which remains in anatomic position      No acute fracture            Workstation performed: [id number]         XR hand 3+ views RIGHT   ED Interpretation by TAMIKA Finney (11/01 1545)   Abnormal   Foreign body noted on the dorsal side of 5th metacarpal      Final Result by Hunter Lombardi MD (11/01 6218)      No evidence of fracture or dislocation      6 x 4 mm radiopaque foreign body in the dorsal soft tissues of the hand overlying the 5th metacarpal       Findings concur with the referring clinician's preliminary interpretation already in the patient's electronic health record  Workstation performed: [id number]                    Procedures  Procedures       ED Course  ED Course as of Nov 01 1728 Fri Nov 01, 2019   1528 XR hand 3+ views RIGHT     1500- laceration was locally anesthetized with 1% lidocaine and cleaned with saline solution  Laceration closed with Steri-Strips  1600:  Foreign body found on x-ray  Steri-Strips were removed and foreign body was attempted to be removed, unsuccessfully    Patient agrees with the plan to be referred to hand specialist                           MDM  Number of Diagnoses or Management Options  Fall: new and requires workup  Laceration of right hand: new and requires workup  Laceration with foreign body of right hand, initial encounter: new and requires workup  Swelling of knee joint, right: new and requires workup     Amount and/or Complexity of Data Reviewed  Tests in the radiology section of CPT®: ordered and reviewed    Risk of Complications, Morbidity, and/or Mortality  Presenting problems: low  Diagnostic procedures: low  Management options: low  General comments: Patient came in today for swelling of right knee and laceration on right hand that she sustained yesterday during a fall  Denies hitting her head or loss of consciousness  X-rays are normal   Foreign body was found on x-ray but was unable to be found  Wound was irrigated and closed with Steri-Strips  Patient placed on antibiotic  Denies needing pain medication  States that she can ambulate fine with her walker that she has  Follow up with hand specialist regarding foreign body  Follow up with Ortho for right knee swelling  Discussed signs of infection and need for return to ER if any signs or symptoms occur  Patient follow-up with PCP regarding wound care  Patient states that she understands  Patient was hypertensive on discharge, likely due to attempt at foreign body removal   Patient was advised to monitor her blood pressure and follow up with PCP as needed  Patient Progress  Patient progress: improved      Disposition  Final diagnoses:   Laceration of right hand   Fall   Swelling of knee joint, right   Laceration with foreign body of right hand, initial encounter     Time reflects when diagnosis was documented in both MDM as applicable and the Disposition within this note     Time User Action Codes Description Comment    11/1/2019  3:14 PM Roise Holualoa Add [N56 865U] Laceration of right hand     11/1/2019  3:15 PM Jewel Avelino [address]  XXXA] Fall     11/1/2019  3:30 PM Roise Holualoa Add [M25 461] Swelling of knee joint, right     11/1/2019  4:12 PM Roise Holualoa Add [K60 596Q] Laceration with foreign body of right hand, initial encounter     11/1/2019  4:12 PM [address] Laceration of right hand     11/1/2019  4:12 PM Roise Holualoa Modify [P11 265Y] Laceration with foreign body of right hand, initial encounter       ED Disposition     ED Disposition Condition Date/Time Comment    Discharge Stable Fri Nov 1, 2019  4:55 PM Eileen Thompson discharge to home/self care  Follow-up Information     Follow up With Specialties Details Why Contact Info Additional Pedro Luis Keen [address] Internal Medicine In 1 week For wound re-check [address] Emergency Department Emergency Medicine  If symptoms worsen [address] [facility] ER [address]  [phone] AN ED, [address], [place], [place], [address] [facility] [place] [facility] Schedule an appointment as soon as possible for a visit in 3 days  [address] [facility] [address] [facility], [facility] [address], [facility] [address], [place], [place], [facility]    Jennifer Gallagher MD Orthopedic Surgery, Hand Surgery Schedule an appointment as soon as possible for a visit in 3 days Follow up with hand specialist regarding foreign body in right hand   [address]             Discharge Medication List as of 11/1/2019  5:02 PM      START taking these medications    Details   cephalexin (KEFLEX) 500 mg capsule Take 1 capsule (500 mg total) by mouth every 6 (six) hours for 7 days, Starting Fri 11/1/2019, Until Fri 11/8/2019, Normal         CONTINUE these medications which have NOT CHANGED    Details   aspirin 81 mg chewable tablet Chew 81 mg daily, Historical Med      atorvastatin (LIPITOR) 20 mg tablet Take 20 mg by mouth daily, Historical Med      bimatoprost (LUMIGAN) 0 01 % ophthalmic drops 1 drop daily at bedtime, Historical Med      DULoxetine (CYMBALTA) 60 mg delayed release capsule Take 20 mg by mouth daily, Historical Med      loperamide (IMODIUM) 2 mg capsule Take 2 mg by mouth 4 (four) times a day as needed for diarrhea, Historical Med      Multiple Vitamin (MULTIVITAMIN) capsule Take 1 capsule by mouth daily, Historical Med      traMADol (ULTRAM) 50 mg tablet Take 50 mg by mouth every 6 (six) hours as needed for moderate pain, Historical Med           No discharge procedures on file      ED Provider  Electronically Signed by           Michelle Adams  11/01/19 8153

## 2019-11-01 NOTE — ED NOTES
Upon providing D/C instructions ALAYNA came to room to advise another xray was needed and not to leave yet         April Axel Barton RN  11/01/19 9181

## 2019-11-02 NOTE — ED ATTENDING ATTESTATION
11/1/2019  IGwen DO, saw and evaluated the patient  I have discussed the patient with the resident/non-physician practitioner and agree with the resident's/non-physician practitioner's findings, Plan of Care, and MDM as documented in the resident's/non-physician practitioner's note, except where noted  All available labs and Radiology studies were reviewed  I was present for key portions of any procedure(s) performed by the resident/non-physician practitioner and I was immediately available to provide assistance  At this point I agree with the current assessment done in the Emergency Department  I have conducted an independent evaluation of this patient a history and physical is as follows:    ED Course    67-year-old female presents to the emergency department from urgent care for evaluation right hand laceration and right knee pain  Patient states that she was carrying a glass in her hand from her son room into her living room when she tripped and fell  The glass broke and she has a laceration to the dorsum of the right hand  The injury occurred well over 12 hours ago  Patient has noticed increased pain and swelling along the dorsum of the hand  She has a history of a previous knee replacement  She does have swelling and pain with weight-bearing in the right knee  She denies head injury or neck pain  She had no loss of consciousness  Patient does have tramadol at home that she did take with some pain relief  Patient's ring was removed from her right ring finger at the urgent care does not suspect that she has a foreign body in her laceration  He denies associated numbness or weakness of the hand  Past medical history:  Osteoarthritis    Physical exam:  Patient is hypertensive  She is alert and in no acute distress  There is a approximately 5 cm linear laceration to the dorsum of the right hand between the 4th and 5th metacarpals  No active bleeding    No foreign body noted on my initial assessment  Patient has some surrounding erythema warmth and tenderness  She is able to flex and extend the hand without difficulty  Sensation is intact  There is moderate swelling of the right knee  No gross deformity noted  Patient knee is stable no ligamentous laxity noted on exam     Plan: Will check x-ray hand and knee  Will irrigate wound  Evaluate for possible foreign body  Patient offered pain medication but declines  Patient's wound will be closed but not sutured surgically due to delay in treatment    Patient's x-ray does demonstrate foreign body at the base of the 5th metacarpal   This is not able to be localized with exploration of the wound  Will refer to hand surgery  Patient will be restarted on oral antibiotics  Patient will also need to follow up with her PCP for recheck of her blood pressure        Critical Care Time  Procedures

## 2019-11-04 ENCOUNTER — TELEPHONE (OUTPATIENT)
Dept: OBGYN CLINIC | Facility: MEDICAL CENTER | Age: 74
End: 2019-11-04

## 2020-03-04 ENCOUNTER — TELEPHONE (OUTPATIENT)
Dept: NEUROLOGY | Facility: CLINIC | Age: 75
End: 2020-03-04

## 2020-03-04 NOTE — TELEPHONE ENCOUNTER
Best contact number for VRBTW[facility]:[phone]    Emergency Contact name and number:    Referring provider and telephone Sheri Chase    Primary Care Provider Name and if affiliated with [facility]: Keren Galeano    Reason for Appointment/Dx:Gait    Neurology Location patient would like to be seen:    Order received? Yes                                                 Records Received? No    Have you ever seen another Neurologist?       No    Insurance [address]      ID/Policy #:    Secondary Insurance:    ID/Policy#: Workman's Comp/ Accident/ School  Information      Workman's Comp/Accident/School related?        No    If yes name of Insurance company:    Date of Injury:    Type of Injury:    [address] Name and Telephone Number:    Notes:Dr, Depadua/new pt pack sent /Ok to schedule per Jim Taylor                     Appointment date: 05/19/20 10:30am

## 2020-03-05 NOTE — TELEPHONE ENCOUNTER
Pt called to schedule appt with Bebeto Taylor as new patient pt stated that provider ok the appt I spoke with MA and she recalled the conversation in between the provider and the pt unfortunately provider can not see her because she needs to be seen by Movement team  I called pt lm message advising that the appt made for 05/19/20 with Bebeto Taylor had to be cancelled to please call back and we will be able to assist her in scheduling appt

## 2020-03-05 NOTE — TELEPHONE ENCOUNTER
Patient contacted us today asking if we can schedule her with Dr Michelle Bob I did see she had an appointment that was cancelled with Dr Michelle Bob so I  advised to her as per previous notes that Dr Michelle Bob unfortunately is not taking new patients at the moment and was not able to see her but I'd be more than happy to schedule her with one of our other Neurologist that will be glad to see her    She advised no that she spoke to Dr Michelle Bob herself at her husbands appointment yesterday and Dr Benita Verde her she can schedule with her on her way out even though she wasn't taking any new patients  I did advise other options  [facility] appointments for next month    Or our movement team [employer]    She denied said she wanted Dr Surendra Bautista and that she would have to do something to fix this because she had her referral sent to us to be able to see her only

## 2020-04-16 ENCOUNTER — TELEPHONE (OUTPATIENT)
Dept: NEUROLOGY | Facility: CLINIC | Age: 75
End: 2020-04-16

## 2020-04-20 ENCOUNTER — TELEPHONE (OUTPATIENT)
Dept: NEUROLOGY | Facility: CLINIC | Age: 75
End: 2020-04-20

## 2020-06-04 ENCOUNTER — TELEPHONE (OUTPATIENT)
Dept: NEUROLOGY | Facility: CLINIC | Age: 75
End: 2020-06-04

## 2020-06-25 ENCOUNTER — TELEPHONE (OUTPATIENT)
Dept: NEUROLOGY | Facility: CLINIC | Age: 75
End: 2020-06-25

## 2021-01-22 ENCOUNTER — TELEPHONE (OUTPATIENT)
Dept: NEUROLOGY | Facility: CLINIC | Age: 76
End: 2021-01-22

## 2021-02-14 ENCOUNTER — IMMUNIZATIONS (OUTPATIENT)
Dept: FAMILY MEDICINE CLINIC | Facility: HOSPITAL | Age: 76
End: 2021-02-14

## 2021-02-14 DIAGNOSIS — Z23 ENCOUNTER FOR IMMUNIZATION: Primary | ICD-10-CM

## 2021-02-14 PROCEDURE — 0001A SARS-COV-2 / COVID-19 MRNA VACCINE (PFIZER-BIONTECH) 30 MCG: CPT

## 2021-02-14 PROCEDURE — 91300 SARS-COV-2 / COVID-19 MRNA VACCINE (PFIZER-BIONTECH) 30 MCG: CPT

## 2021-02-16 ENCOUNTER — CONSULT (OUTPATIENT)
Dept: NEUROLOGY | Facility: CLINIC | Age: 76
End: 2021-02-16
Payer: MEDICARE

## 2021-02-16 VITALS
HEART RATE: 65 BPM | HEIGHT: 64 IN | DIASTOLIC BLOOD PRESSURE: 82 MMHG | BODY MASS INDEX: 21.68 KG/M2 | SYSTOLIC BLOOD PRESSURE: 138 MMHG | WEIGHT: 127 LBS

## 2021-02-16 DIAGNOSIS — R26.9 UNSPECIFIED ABNORMALITIES OF GAIT AND MOBILITY: Primary | ICD-10-CM

## 2021-02-16 PROCEDURE — 99204 OFFICE O/P NEW MOD 45 MIN: CPT | Performed by: PSYCHIATRY & NEUROLOGY

## 2021-02-16 RX ORDER — OXYBUTYNIN CHLORIDE 5 MG/5ML
5 SYRUP ORAL 2 TIMES DAILY
COMMUNITY
End: 2022-02-02 | Stop reason: ALTCHOICE

## 2021-02-16 NOTE — ASSESSMENT & PLAN NOTE
Rashi Escobar is a 67year-old woman with C T and L spine pathology and prior breast CA with chemotherapy induced neuropathy who presents for evaluation of a chronic multifactorial gait disorder which began around the time of her C-spine surgery in 2011  On examination the patient has mild bilateral weakness of FE, IP, TA and intrinsic hand muscles  Her reflexes are brisk at the knees  She has known myelomalacia which likely accounts for much of her gait dysfunction  She has been worked up for this in the past by Dr Denice Slaughter (prior notes reviewed) including serological workup for myelitis  She has no evidence of parkinsonism on examination  We discussed options regarding her follow up and agreed on an evaluation with PM&R

## 2021-02-16 NOTE — PROGRESS NOTES
Assessment/Plan:    Unspecified abnormalities of gait and mobility  Mikael Busch is a 67year-old woman with C T and L spine pathology and prior breast CA with chemotherapy induced neuropathy who presents for evaluation of a chronic multifactorial gait disorder which began around the time of her C-spine surgery in 2011  On examination the patient has mild bilateral weakness of FE, IP, TA and intrinsic hand muscles  Her reflexes are brisk at the knees  She has known myelomalacia which likely accounts for much of her gait dysfunction  She has been worked up for this in the past by Dr Prasanth Rivera (prior notes reviewed) including serological workup for myelitis  She has no evidence of parkinsonism on examination  We discussed options regarding her follow up and agreed on an evaluation with PM&R  Diagnoses and all orders for this visit:    Unspecified abnormalities of gait and mobility  -     Ambulatory referral to Physical Medicine Rehab; Future    Other orders  -     oxybutynin (DITROPAN) 5 MG/5ML syrup; Take 5 mg by mouth 2 (two) times a day        Subjective:     Patient ID: Moses Duggan 67 y o  female    I had the pleasure of seeing your patient, Moses Duggan in the Movement Disorders Clinic at the [facility]  The patient is a 67 y o  female who presents for evaluation of chronic gait dysfunction  The patient reports that her gait dysfunction began about 10 years ago prior to her cervical spine surgery  At that time she was developing progressive bilateral upper extremity weakness, which prompted her surgery  Follow-up imaging of her spine demonstrated multilevel degenerative changes and chronic myelomalacia of the cervical spine  The patient feels that she made significant progress following her C-spine surgery however much of that progress was lost after she was diagnosed with breast cancer in 2012 and developed neuropathy reportedly related to her chemotherapy    She reports that her gait declined significantly around this time but she again regained her ability to walk but has been dependent on her walker or cane since this time  I reviewed prior neurology notes from Dr Rachel Fontana  I reviewed imaging reports from [facility] most recent of which was in 2017 documenting her multilevel degenerative changes and myelomalacia likely secondary to compressive spondylosis  It appears that she underwent an EMG in 2019 but I do not have access to that report  Patient also notes she had complications from bilateral knee replacements  She notes that prior to the pandemic she was going to the gym regularly but has been much less active since the pandemic and feels that she has lost some strength because of her recent inactivity    Family History: no neurlogic disease  Worked in [employer] in the past      The following portions of the patient's history were reviewed and updated as appropriate: allergies, current medications, past family history, past medical history, past social history, past surgical history and problem list     Objective:  /82 (BP Location: Left arm, Patient Position: Sitting, Cuff Size: Standard)   Pulse 65   Ht 5' 4" (1 626 m)   Wt 57 6 kg (127 lb)   BMI 21 80 kg/m²     Physical Exam    Neurological Exam    GENERAL MEDICAL EXAMINATION:  General appearance: alert, in no apparent distress  Appropriately dressed and groomed  Conversing and interacting appropriately  Eyes: Sclera are non-injected  Ears, nose, Mouth, Throat: Mucous membranes are moist    Resp: Breathing comfortably on RA   Musculoskeletal: No evidence of deformities  No contractures  No Edema  Skin: No visible rashes  Warm and well perfused  Psych: normal and appropriate affect     Mental Status:  Alert and oriented to person place and time  Able to relate history without difficulty  Attentive to conversation   Language is fluent and appropriate with normal prosody  There were no paraphasic errors  Speech was not dysarthric  Able to follow both midline and appendicular commands  General Neurology examination:   PERRL, EOMI, Face activates symmetrically  Normal tone throughout  Patient moves all 4 extremities equally and antigravity  No pronator drift  There were no adventitious movements noted  Normal sensation to light touch and temperature in all 4 extremities  Finger to nose without dysmetria bilaterally  No intention tremor  MICHAEL were accurate and symmetric with regard to francisco javier and speed  Fine bl action tremor in the arms  Thenar atrophy bilaterally  Strength:   Delt Bi Tri We FE FF IO    C5 C6 C7 C6 C7 C8/T1 T1   R 5 5 5 5 4 5 4   L 5 5 5 5 4 5 4      IP Quad Hamst TA    L2 L3 L4-S1 L4   R 4 5 5 4   L 4 5 5 4     Reflexes:  Reduced in the arms, brisk at the knees with spread  Gait:   Gait is stable with her walker  msk appearing abnormalities bl  ? Leg length discrepancy     Review of Systems   Constitutional: Negative  Negative for appetite change and fever  HENT: Negative  Negative for hearing loss, tinnitus, trouble swallowing and voice change  Eyes: Negative  Negative for photophobia and pain  Respiratory: Negative  Negative for shortness of breath  Cardiovascular: Negative  Negative for palpitations  Gastrointestinal: Negative  Negative for nausea and vomiting  Endocrine: Negative  Negative for cold intolerance  Genitourinary: Positive for frequency  Negative for dysuria and urgency  Musculoskeletal: Positive for gait problem  Negative for myalgias and neck pain  Immobility or loss of function   Skin: Negative  Negative for rash  Neurological: Positive for numbness  Negative for dizziness, tremors, seizures, syncope, facial asymmetry, speech difficulty, weakness, light-headedness and headaches  Tingling, twiching and cramping   Hematological: Bruises/bleeds easily  Psychiatric/Behavioral: Negative  Negative for confusion, hallucinations and sleep disturbance  All other systems reviewed and are negative  The above ROS was reviewed and updated  Current Outpatient Medications on File Prior to Visit   Medication Sig Dispense Refill    aspirin 81 mg chewable tablet Chew 81 mg daily      atorvastatin (LIPITOR) 20 mg tablet Take 20 mg by mouth daily      DULoxetine (CYMBALTA) 60 mg delayed release capsule Take 20 mg by mouth daily      loperamide (IMODIUM) 2 mg capsule Take 2 mg by mouth 4 (four) times a day as needed for diarrhea      Multiple Vitamin (MULTIVITAMIN) capsule Take 1 capsule by mouth daily      oxybutynin (DITROPAN) 5 MG/5ML syrup Take 5 mg by mouth 2 (two) times a day      traMADol (ULTRAM) 50 mg tablet Take 50 mg by mouth every 6 (six) hours as needed for moderate pain      bimatoprost (LUMIGAN) 0 01 % ophthalmic drops 1 drop daily at bedtime       No current facility-administered medications on file prior to visit          Christelle Jade MD  Medical Director   Movement [address]  [facility]

## 2021-03-07 ENCOUNTER — IMMUNIZATIONS (OUTPATIENT)
Dept: FAMILY MEDICINE CLINIC | Facility: HOSPITAL | Age: 76
End: 2021-03-07

## 2021-03-07 DIAGNOSIS — Z23 ENCOUNTER FOR IMMUNIZATION: Primary | ICD-10-CM

## 2021-03-07 PROCEDURE — 0002A SARS-COV-2 / COVID-19 MRNA VACCINE (PFIZER-BIONTECH) 30 MCG: CPT

## 2021-03-07 PROCEDURE — 91300 SARS-COV-2 / COVID-19 MRNA VACCINE (PFIZER-BIONTECH) 30 MCG: CPT

## 2021-03-16 ENCOUNTER — OFFICE VISIT (OUTPATIENT)
Dept: NEUROLOGY | Facility: CLINIC | Age: 76
End: 2021-03-16
Payer: MEDICARE

## 2021-03-16 VITALS
HEIGHT: 64 IN | BODY MASS INDEX: 21.51 KG/M2 | SYSTOLIC BLOOD PRESSURE: 163 MMHG | HEART RATE: 60 BPM | WEIGHT: 126 LBS | DIASTOLIC BLOOD PRESSURE: 78 MMHG

## 2021-03-16 DIAGNOSIS — Z98.1 HX OF FUSION OF CERVICAL SPINE: ICD-10-CM

## 2021-03-16 DIAGNOSIS — R53.81 PHYSICAL DECONDITIONING: ICD-10-CM

## 2021-03-16 DIAGNOSIS — G62.9 PERIPHERAL POLYNEUROPATHY: Primary | ICD-10-CM

## 2021-03-16 DIAGNOSIS — R26.9 UNSPECIFIED ABNORMALITIES OF GAIT AND MOBILITY: ICD-10-CM

## 2021-03-16 PROCEDURE — 99203 OFFICE O/P NEW LOW 30 MIN: CPT | Performed by: PHYSICAL MEDICINE & REHABILITATION

## 2021-03-16 RX ORDER — SIMVASTATIN 20 MG
20 TABLET ORAL DAILY
COMMUNITY
Start: 2020-12-22

## 2021-03-16 NOTE — PATIENT INSTRUCTIONS
1  Physical Therapy to work on higher level balance activities, improving core strength, strengthening of quads, glutes, knee stabilizers, etc    2  Follow-up with me in 3-6 months

## 2021-03-16 NOTE — PROGRESS NOTES
Review of Systems   Constitutional: Negative  Negative for appetite change and fever  HENT: Negative  Negative for hearing loss, tinnitus, trouble swallowing and voice change  Eyes: Negative  Negative for photophobia and pain  Respiratory: Negative  Negative for shortness of breath  Cardiovascular: Negative  Negative for palpitations  Gastrointestinal: Negative  Negative for nausea and vomiting  Endocrine: Negative  Negative for cold intolerance  Genitourinary: Negative  Negative for dysuria, frequency and urgency  Musculoskeletal: Positive for gait problem  Negative for myalgias and neck pain  Skin: Negative  Negative for rash  Neurological: Positive for weakness and numbness  Negative for dizziness, tremors, seizures, syncope, facial asymmetry, speech difficulty, light-headedness and headaches  Patient stated that she has bilateral feet  numbness and weakness  Hematological: Negative  Does not bruise/bleed easily  Psychiatric/Behavioral: Negative  Negative for confusion, hallucinations and sleep disturbance

## 2021-03-16 NOTE — PROGRESS NOTES
Physical Medicine & Rehabilitation New Patient Evaluation  Mary Sears 67 y o  female      ASSESSMENT/PLAN:     Ms Jaylen See is a pleasant 67year old woman with a PMH of cervical myelopathy s/p significant fusion 10 years ago, Breast cancer s/p chemotherapy, and peripheral neuropathy likely 2/2 chemo  She develop gait instability and balance issues following the development of her peripheral neuropathy (had been functioning normally after recovering from her cervical surgery)  Since then, she developed a rigorous exercise program at the gym (all strengthening, no stretching), that she performed on a regular basis until [address] occurred  She only recently started exercising again and notes that she feels more deconditioned, off balance, quicker to fatigue  I reviewed her labs with her that Neurology at [facility] had previously ordered to evaluate her neuropathy (SPEP, B12, Folate, A1C, Copper) which were negative  She takes a daily B12 supplement  I also reviewed her previous MRI imaging with her  On exam she has primarily distal weakness in her hands, and minimally in her LE  Her sensory deficits in vibration and pinprick are more significant  Her proprioception is ok, and she actually did well with tandem gait, but required her walker  We talked about doing another EMG - but I'm not sure that would   If her symptoms/exam progresses could consider to better categorize neuropathy  Also if she develops concerning symptoms could consider paraneoplastic panel       We discussed that she is likely just deconditioned, and that her baseline gait impairment due to peripheral neuropathy that has been going on for many years is likely not to change, however, given her medical history and current reported change in function, and recent falls it is not unreasonable to go to physical therapy to work on higher level balance activities, improve core strength, strengthen her quads, glutes, and knee stabilizers, and most importantly develop a strong stretching program she can perform at home  She has some ITB tightness on the L > R, and definitely hamstring tightness  I do not think that her current issues are related to a new/progressive myelopathy based on her exam  However, I did review with her, given her medical history, red flag symptoms to monitor for (increased neck pain, radiating pain, fevers, sweats, night chills, increased weakness, increased bladder dysfunction beyond baseline, etc ) I said at that point either go to the ER or call me or her physician  She expressed understanding  She can follow-up with me in 3-6 months  Chang Molina was seen today for neurologic problem  Diagnoses and all orders for this visit:    Peripheral polyneuropathy  -     Cancel: Ambulatory referral to Physical Therapy; Future  -     Ambulatory referral to Physical Therapy; Future    Unspecified abnormalities of gait and mobility    Hx of fusion of cervical spine        *I have spent 45 minutes with Patient  today in which greater than 50% of this time was spent in counseling/coordination of care regarding Prognosis, Intructions for management, Patient and family education, Importance of tx compliance, Risk factor reductions and Impressions  HPI/SUBJECTIVE: Keven Scott 67 y o  female with a past medical history of Breast cancer, CVA, GERD, Cervical Spine surgery presenting for chronic gait dysfunction as a referral from Dr Marky Burroughs  10 years prior, she had cervical surgery prompted by bilateral upper extremity weakness  Follow-up imaging of her spine demonstrated multilevel degenerative changes and chronic myelomalacia of her cervical spine  She made significant progress to almost baseline, but then developed breast cancer in 2012 and subsequently neuropathy related to chemotherapy with significant decline in her gait  Since then, she has been using her walker and has had issues with sensation and balance   She has been fairly good exercising every other day in the gym  Since March of last year/lock down with COVID she hasn't been as consistent  She did the treadmill for a period of time, but slowly that went by the wayside  She is started up again the past couple of days with her workout  She has had maybe 2 falls in the past 6 months - usually due to moving too quickly and losing her balance  This is not typical for her  She denies fevers, chills, unexpected weight loss  Denies any increase in pain  She has a lot of urinary frequency, which is at its baseline - she takes oxybutynin for this  She denies confusion, changes in vision, hearing, swallowing, new numbness/tingling/weakness  She has an ileostomy which is functioning well  At this point, she denies any new neck pain or radiation down her arms  Her  was diagnosed with Alzheimers in the past 2 years  They still live together  Expanded Social History/Living Situation:   Patient lives with spouse in a single family home with 2 steps to enter  Driving: +     Function:   Current Level of Function: Independent with bathing, dressing, ambulation with RW, toileting  Prior to level of function: 6[address]  Functional Goals: Improved balance and endurance, prevention of falls  Review of Systems  A 10 point review of systems was negative except for what is noted in the HPI  OBJECTIVE:   /78 (BP Location: Right arm, Patient Position: Sitting, Cuff Size: Adult)   Pulse 60   Ht 5' 4" (1 626 m)   Wt 57 2 kg (126 lb)   BMI 21 63 kg/m²        Gen: No acute distress, Well-nourished, well-appearing  HEENT: Moist mucus membranes, Normocephalic/Atraumatic  Cardiovascular: Regular r  Heme/Extr: No edema  Pulmonary: Non-labored breathing  GI: Soft, non-tender, non-distended  MSK: Observed ambulating, adequate step length and clearance  Tandem gait fine with walker, difficult without  Diffuse arthritic changes   Muscle wasting noticed in her bileral hands in ulnar/median distribution  Integumentary: Skin is warm, dry  Neuro: AAOx3, CN 2-12 intact  Impaired vibratory sensation bilaterally, questionable impairment in proprioception bilaterally  Impaired pinprick in a stocking glove distribution  Speech is intact  Appropriate to questioning  Tone is normal     DTRs: 2+ in her patella, but elsewhere she is hyporeflexic  No clonus, no kinjal's  Bilateral babinski is present  Coord: No issues with FTN       MMT:   Strength:   Right  Left  Site  Right  Left  Site    5 5  S Ab: Shoulder Abductors  5  5  HF: Hip Flexors    5 5  EF: Elbow Flexors  5  5 KF: Knee Flexors    5  5  EE: Elbow Extensors  5  5  KE: Knee Extensors    5  5  WE: Wrist Extensors  5  5  DR: Dorsi Flexors    4  4  FF: Finger Flexors  5  5  PF: Plantar Flexors    3  3  HI: Hand Intrinsics  4  4  EHL: Extensor Hallucis Longus   Psych: Normal mood and affect       Imaging: I personally reviewed pertinent imaging    Labs: Personally reviewed  No results found for: WBC, HGB, HCT, MCV, PLT  No results found for: GLUCOSE, CALCIUM, NA, K, CO2, CL, BUN, CREATININE      The following portions of the patient's history were reviewed and updated as appropriate: allergies, current medications, past family history, past medical history, past social history, past surgical history and problem list     Past Medical History:   Diagnosis Date    Back pain     Breast cancer ([facility] [address] ) 2012    C  difficile diarrhea     CVA [facility]cerebral vascular accident) ([facility] [address] )     GERD (gastroesophageal reflux disease)     High blood pressure     Hx of cervical spine surgery 2011    Hypertension     Spondylosis     TIA (transient ischemic attack) 01/01/2011    Urinary incontinence        Patient Active Problem List    Diagnosis Date Noted    Unspecified abnormalities of gait and mobility 02/16/2021    Laceration with foreign body of right hand, initial encounter 11/01/2019       Past Surgical History:   Procedure Laterality Date    BLADDER SUSPENSION      BREAST SURGERY      BREAST SURGERY Left 01/01/2012    CERVICAL SPINE SURGERY  05/01/2011    2 rods 13 screws    ILEOSTOMY  01/01/2013    REPLACEMENT TOTAL KNEE Left 01/01/2016    REPLACEMENT TOTAL KNEE BILATERAL      TOTAL KNEE ARTHROPLASTY Right 01/01/2013    TOTAL KNEE ARTHROPLASTY REVISION Right 01/01/2015       Family History   Family history unknown: Yes       Social History     No Known Allergies      Current Outpatient Medications:     aspirin 81 mg chewable tablet, Chew 81 mg daily, Disp: , Rfl:     atorvastatin (LIPITOR) 20 mg tablet, Take 20 mg by mouth daily, Disp: , Rfl:     bimatoprost (LUMIGAN) 0 01 % ophthalmic drops, 1 drop daily at bedtime, Disp: , Rfl:     DULoxetine (CYMBALTA) 60 mg delayed release capsule, Take 20 mg by mouth daily, Disp: , Rfl:     loperamide (IMODIUM) 2 mg capsule, Take 2 mg by mouth 4 (four) times a day as needed for diarrhea, Disp: , Rfl:     Multiple Vitamin (MULTIVITAMIN) capsule, Take 1 capsule by mouth daily, Disp: , Rfl:     oxybutynin (DITROPAN) 5 MG/5ML syrup, Take 5 mg by mouth 2 (two) times a day, Disp: , Rfl:     traMADol (ULTRAM) 50 mg tablet, Take 50 mg by mouth every 6 (six) hours as needed for moderate pain, Disp: , Rfl:

## 2021-07-09 ENCOUNTER — TELEPHONE (OUTPATIENT)
Dept: NEUROLOGY | Facility: CLINIC | Age: 76
End: 2021-07-09

## 2021-07-09 NOTE — TELEPHONE ENCOUNTER
Patient of Dr Yobani Duran    She called to get script mailed for PT; she said she misplaced it  Mailed as requested to address in Epic

## 2021-08-12 ENCOUNTER — TELEPHONE (OUTPATIENT)
Dept: NEUROLOGY | Facility: CLINIC | Age: 76
End: 2021-08-12

## 2021-08-12 NOTE — TELEPHONE ENCOUNTER
Called and left patient a message  Advised to callback to schedule her 6 month follow up w/Dr Amy Cerna

## 2021-09-13 ENCOUNTER — EVALUATION (OUTPATIENT)
Dept: PHYSICAL THERAPY | Facility: CLINIC | Age: 76
End: 2021-09-13
Payer: MEDICARE

## 2021-09-13 DIAGNOSIS — R26.89 ABNORMALITY OF GAIT DUE TO IMPAIRMENT OF BALANCE: ICD-10-CM

## 2021-09-13 DIAGNOSIS — G62.9 PERIPHERAL POLYNEUROPATHY: Primary | ICD-10-CM

## 2021-09-13 PROCEDURE — 97162 PT EVAL MOD COMPLEX 30 MIN: CPT | Performed by: PHYSICAL THERAPIST

## 2021-09-13 NOTE — PROGRESS NOTES
PT Evaluation     Today's date: 2021  Patient name: Loulou Simeon  :   MRN: [id number]  Referring provider: Karen Lang MD  Dx:   Encounter Diagnosis     ICD-10-CM    1  Peripheral polyneuropathy  G62 9 Ambulatory referral to Physical Therapy     PT plan of care cert/re-cert   2  Abnormality of gait due to impairment of balance  R26 89 PT plan of care cert/re-cert       Start Time: 1530  Stop Time: 1610  Total time in clinic (min): 40 minutes    Assessment  Assessment details: Loulou Simeon is a alfred 67 y o  female who presents with strength and balance deficits with overall deconditioning  Patient has polyneuropathy which impacts her balance at baseline, and she uses a FWW for all mobility  The patient's greatest concerns are fear of not being able to keep active  No further referral appears necessary at this time based upon examination results  Primary movement impairment diagnosis of weakness in BLE resulting in pathoanatomical symptoms of Peripheral polyneuropathy  (primary encounter diagnosis)  Abnormality of gait due to impairment of balance and limiting her ability to exercise or recreation, get out of a chair, lift, perform household chores, perform yard work, stand, walk and get up and down the stairs and get off of the floor  Impairments include:  1) Weakness in scarlet LE  2) Impaired static and dynamic balance  3) Impaired functional mobility    Etiologic factors include poor lower extremity strength and poor balance  Discussed risks, benefits, and alternatives to treatment, and answered all patient questions to patient satisfaction    Impairments: abnormal gait, abnormal muscle firing, abnormal muscle tone, abnormal or restricted ROM, abnormal movement, activity intolerance, impaired balance, impaired physical strength, lacks appropriate home exercise program, pain with function, safety issue and poor posture   Understanding of Dx/Px/POC: good   Prognosis: good  Prognosis details: Positive prognostic indicators include positive attitude toward recovery, good understanding of diagnosis and treatment plan options and absence of observed red flags  Negative prognostic indicators include chronicity of symptoms, multiple concurrent orthopedic problems and multiple prior failed treatments  Goals  Impairment Goals 4-6 weeks  - Improve knee AROM to equal to the unaffected lower extremity  - Increase knee strength to 4+/5 throughout  - Increase hip strength to 4/5 throughout  - Improve TUG to < 13 seconds to demonstrate low risk for falls  - Improve 30 sec STS to within age related norms    Functional Goals 6-12 weeks  - Return to Prior Level of Function  - Increase Functional Status Measure (FOTO) to: 64  - Patient will be independent with HEP  - Patient will be able to get on and off the floor without increased pain/compensation/difficulty  - Patient will be able to perform sit to stand without increased pain/compensation/difficulty   - Patient will be able to ascend and descend stairs without increased pain/compensation/difficulty   - Patient will be able to walk with least restrictive AD      Plan  Plan details: Prognosis above is given PT services 2x/week tapering to 1x/week over the next 3 months and home program adherence    Patient would benefit from: skilled physical therapy  Planned modality interventions: cryotherapy and thermotherapy: hydrocollator packs  Planned therapy interventions: abdominal trunk stabilization, behavior modification, body mechanics training, breathing training, flexibility, functional ROM exercises, home exercise program, joint mobilization, manual therapy, massage, Talavera taping, muscle pump exercises, neuromuscular re-education, patient education, postural training, strengthening, stretching, therapeutic activities, therapeutic exercise, therapeutic training, balance, gait training and transfer training  Frequency: 2x week  Duration in weeks: 12  Treatment plan discussed with: patient        Subjective Evaluation    History of Present Illness  Mechanism of injury: HOME LIFE:  passed, lives alone  Hired someone for cleaning and lawncare  Has stairs in her home  Uses a golf cart to get the mail and get around the neighborhood  HOBBIES/EXERCISE: Gardening, uses rollator  PLOF:  C2-C7 cervical fusion  2 knee replacements each knee  Breast CA  Has a brace for R knee to prevent hyperextension, does not wear it  HISTORY OF CURRENT INJURY:  Patient has peripheral polyneuropathy, which may have come from the chromotherapy  She has had issues walking for years  She uses a FWW all the time in the house and outside the house  She notes that her balance and strength are both getting worse since COVID  She has not been to the gym since last March  She has not been exercising and feels deconditioned  She is concerned mostly about the legs and her balance  At baseline, she uses FWW and cane  She does not feel very confident with the [facility]  PAIN LOCATION/DESCRIPTORS: occasional sciatic pain with prolonged standing and bending over  AGGRAVATING FACTORS:  Getting out of a low chair, walking with SPC, walking without AD, stairs, walking  EASES: FWW  DAY PATTERN: worst with fatigue, end of the day  IMAGING:  None recent  SPECIAL QUESTIONS:    Jefferson Jackson denies a new onset of Bladder incontinence, Bowel dysfunction, Recent unexplained weight loss, Clumsy or unsteadiness and Constant night pain  PATIENT GOALS: Patient wishes to work on her balance and strength in the lower body       Pain  Current pain ratin  At best pain ratin  At worst pain rating: 3  Location: low back  Quality: dull ache and discomfort  Progression: worsening    Patient Goals  Patient goals for therapy: improved balance, increased motion, increased strength, independence with ADLs/IADLs and return to sport/leisure activities          Objective     Strength/Myotome Testing     Left Hip Planes of Motion   Flexion: 4-  Abduction: 3-  External rotation: 3  Internal rotation: 3    Right Hip   Planes of Motion   Flexion: 4-  Abduction: 3-  External rotation: 3  Internal rotation: 3    Left Knee   Flexion: 4-  Extension: 4-    Right Knee   Flexion: 4-  Extension: 4-    Left Ankle/Foot   Dorsiflexion: 4  Plantar flexion: 4  Great toe extension: 4    Right Ankle/Foot   Dorsiflexion: 4  Plantar flexion: 4  Great toe extension: 4    General Comments:      Lumbar Comments  Ambulation: scarlet knee hyperextension, L hip and knee toe in, uses FWW  Sit to stand: valgus collapse  TU seconds using FWW and hands to stand up  30 sec STS: 9 reps using scarlet hands on arm rests  4 step balance test:              Diagnosis: Balance and strength   Precautions: hx of CA, 2 scarlet knee replacements, cervical fusion   Primary Goals: 1) Weakness in scarlet LE  2) Impaired static and dynamic balance  3) Impaired functional mobility   *asterisks by exercise = given for HEP   Manuals                                                There Ex        Bike        Standing marches        Standing HS curls        3 way SLR standing        HR/TR        Bridge        Clamshell                        Neuro Re-Ed        Biodex        Tandem stance        Standing on foam        Gait over obstacles        Forward and backward walking at [employer] squats                                                         Re-evaluation              Ther Act              Fall recovery  NV!            Step ups        Step downs                 Sit to stand             Modalities

## 2021-09-15 ENCOUNTER — OFFICE VISIT (OUTPATIENT)
Dept: PHYSICAL THERAPY | Facility: CLINIC | Age: 76
End: 2021-09-15
Payer: MEDICARE

## 2021-09-15 DIAGNOSIS — R26.89 ABNORMALITY OF GAIT DUE TO IMPAIRMENT OF BALANCE: Primary | ICD-10-CM

## 2021-09-15 DIAGNOSIS — G62.9 PERIPHERAL POLYNEUROPATHY: ICD-10-CM

## 2021-09-15 PROCEDURE — 97110 THERAPEUTIC EXERCISES: CPT | Performed by: PHYSICAL THERAPIST

## 2021-09-15 PROCEDURE — 97530 THERAPEUTIC ACTIVITIES: CPT | Performed by: PHYSICAL THERAPIST

## 2021-09-15 NOTE — PROGRESS NOTES
Daily Note     Today's date: 9/15/2021  Patient name: Gabriela Hutchison  :   MRN: [id number]  Referring provider: Jagdish Prado MD  Dx:   Encounter Diagnosis     ICD-10-CM    1  Abnormality of gait due to impairment of balance  R26 89    2  Peripheral polyneuropathy  G62 9        Start Time: 1100  Stop Time: 1153  Total time in clinic (min): 53 minutes    Subjective: Patient reports she feels okay today  Objective: See treatment diary below    Assessment: All exercises performed today without resistance, with minimal muscle fatigue  Plan to add weights NV  Educated about DOMS  Education provided to avoid allowing scarlet knees to hyperextend during exercises and engaging her muscles instead  HEP printed for strengthening exercises to perform at home to improve carryover  Trialed fall recovery, with mod assist to get on the floor and only CGA required to return to standing  Plan to perform each visit  Plan: Progress treatment as tolerated  Diagnosis: Balance and strength   Precautions: hx of CA, 2 scarlet knee replacements, cervical fusion   Primary Goals: 1) Weakness in scarlet LE  2) Impaired static and dynamic balance  3) Impaired functional mobility   *asterisks by exercise = given for HEP   Manuals 9/13 9/15                                              There Ex        Bike  5 min      Standing marches*  2x10 ea      Standing HS curls*  2x10 ea      3 way SLR standing*  2x10 ea      HR/TR*  2x10 ea      Bridge        Clamshell  2x10 scarlet      SLR  2x10 scarlet              Neuro Re-Ed        Biodex        Tandem stance        Standing on foam        Gait over obstacles        Forward and backward walking at barre        Mini squats                                                         Re-evaluation              Ther Act              Fall recovery  NV!   Performed between 2 chairs and using table, foam under knees, tall kneel to half kneel to stand CGA  Perform each visit       Step ups        Step downs Sit to stand    x 10, cuing to avoid valgus         Modalities

## 2021-09-22 ENCOUNTER — OFFICE VISIT (OUTPATIENT)
Dept: PHYSICAL THERAPY | Facility: CLINIC | Age: 76
End: 2021-09-22
Payer: MEDICARE

## 2021-09-22 DIAGNOSIS — R26.89 ABNORMALITY OF GAIT DUE TO IMPAIRMENT OF BALANCE: Primary | ICD-10-CM

## 2021-09-22 DIAGNOSIS — G62.9 PERIPHERAL POLYNEUROPATHY: ICD-10-CM

## 2021-09-22 PROCEDURE — 97110 THERAPEUTIC EXERCISES: CPT

## 2021-09-22 PROCEDURE — 97112 NEUROMUSCULAR REEDUCATION: CPT

## 2021-09-22 NOTE — PROGRESS NOTES
Daily Note     Today's date: 2021  Patient name: Loulou Simeon  :   MRN: [id number]  Referring provider: Karen Lang MD  Dx:   Encounter Diagnosis     ICD-10-CM    1  Abnormality of gait due to impairment of balance  R26 89    2  Peripheral polyneuropathy  G62 9        Start Time: 1206  Stop Time: 1300  Total time in clinic (min): 54 minutes    Subjective: Patient states she was a little sore following last session  She states her hamstrings are sore because she has been gardening  She states she knows how to do do it to avoid this discomfort, but she needs to get a lot done  Objective: See treatment diary below      Assessment: Continued with outlined program  Progressed patient with standing balance activities on static and dynamic surfaces  She shows improvement with decrease lateral sway throughout trials  Patient was able to increase reps for hip strengthening exercises this visit  Will add weights at next visit  Patient able to avoid hyperextension of scarlet knees with standing exercises which is good carryover from previous session  She has increase weakness with LLE exercises compared to RLE  Will continue to progress as she is able to tolerate  Plan: Progress treatment as tolerated         Diagnosis: Balance and strength   Precautions: hx of CA, 2 scarlet knee replacements, cervical fusion   Primary Goals: 1) Weakness in scarlet LE  2) Impaired static and dynamic balance  3) Impaired functional mobility   *asterisks by exercise = given for HEP   Manuals 9/13 9/15 9/22                                             There Ex        Bike  5 min 5 min      Standing marches*  2x10 ea 3x10 ea      Standing HS curls*  2x10 ea 3x10 ea      3 way SLR standing*  2x10 ea 3x10 ea      HR/TR*  2x10 ea 3x10 ea      Bridge        Clamshell  2x10 scarlet      SLR  2x10 scarlet 3x10 scarlet              Neuro Re-Ed        Biodex        Tandem stance   2x30 sec ea     Standing on foam   FT EO 2x30 sec ea     Gait over obstacles        Forward and backward walking at barre        Mini squats                                                         Re-evaluation              Ther Act              Fall recovery - perform each visit   NV!   Performed between 2 chairs and using table, foam under knees, tall kneel to half kneel to stand CGA 2x 2 chairs/foam  CGA down and up       Step ups        Step downs                 Sit to stand    x 10, cuing to avoid valgus         Modalities

## 2021-09-23 ENCOUNTER — OFFICE VISIT (OUTPATIENT)
Dept: PHYSICAL THERAPY | Facility: CLINIC | Age: 76
End: 2021-09-23
Payer: MEDICARE

## 2021-09-23 DIAGNOSIS — G62.9 PERIPHERAL POLYNEUROPATHY: ICD-10-CM

## 2021-09-23 DIAGNOSIS — R26.89 ABNORMALITY OF GAIT DUE TO IMPAIRMENT OF BALANCE: Primary | ICD-10-CM

## 2021-09-23 PROCEDURE — 97110 THERAPEUTIC EXERCISES: CPT

## 2021-09-23 PROCEDURE — 97530 THERAPEUTIC ACTIVITIES: CPT

## 2021-09-23 NOTE — PROGRESS NOTES
Daily Note     Today's date: 2021  Patient name: Darek Healy  :   MRN: [id number]  Referring provider: Baron Deniz MD  Dx:   Encounter Diagnosis     ICD-10-CM    1  Abnormality of gait due to impairment of balance  R26 89    2  Peripheral polyneuropathy  G62 9        Start Time: 1130  Stop Time: 1215  Total time in clinic (min): 45 minutes    Subjective: Patient states she is sore today following her PT yesterday  Objective: See treatment diary below      Assessment: Continued with outlined program  Patient was able to increase weight with standing hip exercises this visit  Patient continues to require cueing to maintain proper technique and avoid genu valgus with transfers  She was able to perform floor recovery with improvement and less assistant  Patient has overall improved confidence with this exercise  Patient instructed to perform exercise one time over the week and we will resume and progress next visit  Plan: Progress treatment as tolerated         Diagnosis: Balance and strength   Precautions: hx of CA, 2 scarlet knee replacements, cervical fusion   Primary Goals: 1) Weakness in scarlet LE  2) Impaired static and dynamic balance  3) Impaired functional mobility   *asterisks by exercise = given for HEP   Manuals 9/13 9/15 9/22 9/23                                            There Ex        Bike  5 min 5 min  5 min     Standing marches*  2x10 ea 3x10 ea  1# 2x10    Standing HS curls*  2x10 ea 3x10 ea  1# 2x10    3 way SLR standing*  2x10 ea 3x10 ea  1# 2x10    HR/TR*  2x10 ea 3x10 ea  3x10ea     Bridge        Clamshell  2x10 scarlet      SLR  2x10 scarlet 3x10 scarlet              Neuro Re-Ed        Bi[employer]        Tandem stance   2x30 sec ea 2x30 sec ea     Standing on foam   FT EO 2x30 sec ea FT EO 2x30 sec ea    Gait over obstacles        Forward and backward walking at [employer]        [employer]        [employer] board    1 min ea                                              Re-evaluation Ther Act              Fall recovery - perform each visit   NV!   Performed between 2 chairs and using table, foam under knees, tall kneel to half kneel to stand CGA 2x 2 chairs/foam  CGA down and up 2x 2 chairs/foam CGA down and up      Step ups        Step downs                 Sit to stand    x 10, cuing to avoid valgus         Modalities

## 2021-09-27 ENCOUNTER — OFFICE VISIT (OUTPATIENT)
Dept: PHYSICAL THERAPY | Facility: CLINIC | Age: 76
End: 2021-09-27
Payer: MEDICARE

## 2021-09-27 DIAGNOSIS — G62.9 PERIPHERAL POLYNEUROPATHY: ICD-10-CM

## 2021-09-27 DIAGNOSIS — R26.89 ABNORMALITY OF GAIT DUE TO IMPAIRMENT OF BALANCE: Primary | ICD-10-CM

## 2021-09-27 PROCEDURE — 97112 NEUROMUSCULAR REEDUCATION: CPT

## 2021-09-27 PROCEDURE — 97110 THERAPEUTIC EXERCISES: CPT

## 2021-09-27 NOTE — PROGRESS NOTES
Daily Note     Today's date: 2021  Patient name: Jarrett Rowley  :   MRN: [id number]  Referring provider: Makenna Encinas MD  Dx:   Encounter Diagnosis     ICD-10-CM    1  Abnormality of gait due to impairment of balance  R26 89    2  Peripheral polyneuropathy  G62 9        Start Time: 1145  Stop Time: 1230  Total time in clinic (min): 45 minutes    Subjective: Patient states she was a little sore following last session  She states she was very busy over the weekend weeding and planting mums  Objective: See treatment diary below      Assessment: Continued with outlined program  Patient was able to increase reps with standing hip strengthening exercises this visit with moderate muscle fatigue  She has improved standing technique to avoid hyperextension  Patient continues to improve with floor recovery and does not require assistance with lowering and standing as she does need UE support  Patient is slowly progressing with standing balance, but still requires UE support due to lateral sway  Will continue to progress as she is able to tolerate  Plan: Progress treatment as tolerated         Diagnosis: Balance and strength   Precautions: hx of CA, 2 scarlet knee replacements, cervical fusion   Primary Goals: 1) Weakness in scarlet LE  2) Impaired static and dynamic balance  3) Impaired functional mobility   *asterisks by exercise = given for HEP   Manuals 9/13 9/15 9/22 9/23 9/27                                           There Ex        Bike  5 min 5 min  5 min  5 min    Standing marches*  2x10 ea 3x10 ea  1# 2x10 1# 3x10 ea   Standing HS curls*  2x10 ea 3x10 ea  1# 2x10 1# 3x10 ea    3 way SLR standing*  2x10 ea 3x10 ea  1# 2x10 1# 3x10 ea    HR/TR*  2x10 ea 3x10 ea  3x10ea  3x10 ea    Bridge        Clamshell  2x10 scarlet      SLR  2x10 scarlet 3x10 scarlet              Neuro Re-Ed        [employer]     NPV    Tandem stance   2x30 sec ea 2x30 sec ea  2x30 sec ea   Standing on foam   FT EO 2x30 sec ea FT EO 2x30 sec ea FT EO 2x30 sec ea   Gait over obstacles        Forward and backward walking at barre        Mini squats        Wobble board    1 min ea  1 min ea                                             Re-evaluation              Ther Act              Fall recovery - perform each visit   NV!   Performed between 2 chairs and using table, foam under knees, tall kneel to half kneel to stand CGA 2x 2 chairs/foam  CGA down and up 2x 2 chairs/foam CGA down and up  3x 2 chairs/foam   CGA down and up   Step ups        Step downs                 Sit to stand    x 10, cuing to avoid valgus         Modalities

## 2021-09-30 ENCOUNTER — OFFICE VISIT (OUTPATIENT)
Dept: PHYSICAL THERAPY | Facility: CLINIC | Age: 76
End: 2021-09-30
Payer: MEDICARE

## 2021-09-30 DIAGNOSIS — R26.89 ABNORMALITY OF GAIT DUE TO IMPAIRMENT OF BALANCE: Primary | ICD-10-CM

## 2021-09-30 DIAGNOSIS — G62.9 PERIPHERAL POLYNEUROPATHY: ICD-10-CM

## 2021-09-30 PROCEDURE — 97110 THERAPEUTIC EXERCISES: CPT

## 2021-09-30 PROCEDURE — 97112 NEUROMUSCULAR REEDUCATION: CPT

## 2021-09-30 NOTE — PROGRESS NOTES
Daily Note     Today's date: 2021  Patient name: Zack Aguilar  :   MRN: [id number]  Referring provider: Hood Osborne MD  Dx:   Encounter Diagnosis     ICD-10-CM    1  Abnormality of gait due to impairment of balance  R26 89    2  Peripheral polyneuropathy  G62 9        Start Time: 1050  Stop Time: 1135  Total time in clinic (min): 45 minutes    Subjective: Patient states she was a little sore after last visit  She states she is feeling pretty good today  Objective: See treatment diary below      Assessment: Continued with outlined program  Patient demonstrates improved tolerance to increase reps and resistance with hip strengthening exercises  She is able to perform dynamic balance but still requires UE support due to lateral sway  Patient locking out knees and compensating this session with wobble board, cued to focusing on using hip and avoiding knocked knees while standing which she was able to correct, but noted weakness  She continues to make steady progress with floor recovery  Plan: Progress treatment as tolerated         Diagnosis: Balance and strength   Precautions: hx of CA, 2 scarlet knee replacements, cervical fusion   Primary Goals: 1) Weakness in scarlet LE  2) Impaired static and dynamic balance  3) Impaired functional mobility   *asterisks by exercise = given for HEP   Manuals 9/30 9/15 9/22 9/23 9/27                                           There Ex        Bike 5 min  5 min 5 min  5 min  5 min    Standing marches* 1 5# 2x10 ea 2x10 ea 3x10 ea  1# 2x10 1# 3x10 ea   Standing HS curls* 1 5# 2x10 ea  2x10 ea 3x10 ea  1# 2x10 1# 3x10 ea    3 way SLR standing* 1 5# 2x10 ea  2x10 ea 3x10 ea  1# 2x10 1# 3x10 ea    HR/TR* 3x10 ea  2x10 ea 3x10 ea  3x10ea  3x10 ea    Bridge        Clamshell  2x10 scarlet      SLR  2x10 scarlet 3x10 scarlet              Neuro Re-Ed        [employer] NPV       Tandem stance 2x30 sec ea   2x30 sec ea 2x30 sec ea  2x30 sec ea   Standing on foam FT EO 2x30 sec ea   FT EO 2x30 sec ea FT EO 2x30 sec ea FT EO 2x30 sec ea   Gait over obstacles        Forward and backward walking at barre        Mini squats        Wobble board 1 min ea    1 min ea  1 min ea                                             Re-evaluation              Ther Act              Fall recovery - perform each visit  3x 2 chair/foam CGA down and up   Performed between 2 chairs and using table, foam under knees, tall kneel to half kneel to stand CGA 2x 2 chairs/foam  CGA down and up 2x 2 chairs/foam CGA down and up  3x 2 chairs/foam   CGA down and up   Step ups        Step downs                 Sit to stand    x 10, cuing to avoid valgus         Modalities

## 2021-10-04 ENCOUNTER — OFFICE VISIT (OUTPATIENT)
Dept: PHYSICAL THERAPY | Facility: CLINIC | Age: 76
End: 2021-10-04
Payer: MEDICARE

## 2021-10-04 DIAGNOSIS — G62.9 PERIPHERAL POLYNEUROPATHY: ICD-10-CM

## 2021-10-04 DIAGNOSIS — R26.89 ABNORMALITY OF GAIT DUE TO IMPAIRMENT OF BALANCE: Primary | ICD-10-CM

## 2021-10-04 PROCEDURE — 97112 NEUROMUSCULAR REEDUCATION: CPT

## 2021-10-04 PROCEDURE — 97110 THERAPEUTIC EXERCISES: CPT

## 2021-10-06 ENCOUNTER — OFFICE VISIT (OUTPATIENT)
Dept: PHYSICAL THERAPY | Facility: CLINIC | Age: 76
End: 2021-10-06
Payer: MEDICARE

## 2021-10-06 DIAGNOSIS — R26.89 ABNORMALITY OF GAIT DUE TO IMPAIRMENT OF BALANCE: Primary | ICD-10-CM

## 2021-10-06 DIAGNOSIS — G62.9 PERIPHERAL POLYNEUROPATHY: ICD-10-CM

## 2021-10-06 PROCEDURE — 97110 THERAPEUTIC EXERCISES: CPT

## 2021-10-06 PROCEDURE — 97112 NEUROMUSCULAR REEDUCATION: CPT

## 2021-10-11 ENCOUNTER — EVALUATION (OUTPATIENT)
Dept: PHYSICAL THERAPY | Facility: CLINIC | Age: 76
End: 2021-10-11
Payer: MEDICARE

## 2021-10-11 DIAGNOSIS — R26.89 ABNORMALITY OF GAIT DUE TO IMPAIRMENT OF BALANCE: Primary | ICD-10-CM

## 2021-10-11 DIAGNOSIS — G62.9 PERIPHERAL POLYNEUROPATHY: ICD-10-CM

## 2021-10-11 PROCEDURE — 97110 THERAPEUTIC EXERCISES: CPT | Performed by: PHYSICAL THERAPIST

## 2021-10-11 PROCEDURE — 97112 NEUROMUSCULAR REEDUCATION: CPT | Performed by: PHYSICAL THERAPIST

## 2021-10-13 ENCOUNTER — APPOINTMENT (OUTPATIENT)
Dept: PHYSICAL THERAPY | Facility: CLINIC | Age: 76
End: 2021-10-13
Payer: MEDICARE

## 2021-10-14 ENCOUNTER — APPOINTMENT (OUTPATIENT)
Dept: PHYSICAL THERAPY | Facility: CLINIC | Age: 76
End: 2021-10-14
Payer: MEDICARE

## 2021-10-18 ENCOUNTER — OFFICE VISIT (OUTPATIENT)
Dept: PHYSICAL THERAPY | Facility: CLINIC | Age: 76
End: 2021-10-18
Payer: MEDICARE

## 2021-10-18 DIAGNOSIS — R26.89 ABNORMALITY OF GAIT DUE TO IMPAIRMENT OF BALANCE: Primary | ICD-10-CM

## 2021-10-18 DIAGNOSIS — G62.9 PERIPHERAL POLYNEUROPATHY: ICD-10-CM

## 2021-10-18 PROCEDURE — 97112 NEUROMUSCULAR REEDUCATION: CPT

## 2021-10-18 PROCEDURE — 97110 THERAPEUTIC EXERCISES: CPT

## 2021-10-18 RX ORDER — METHYLPREDNISOLONE 4 MG/1
4 TABLET ORAL 2 TIMES DAILY
COMMUNITY
End: 2022-02-02 | Stop reason: ALTCHOICE

## 2021-10-20 ENCOUNTER — APPOINTMENT (OUTPATIENT)
Dept: PHYSICAL THERAPY | Facility: CLINIC | Age: 76
End: 2021-10-20
Payer: MEDICARE

## 2021-10-21 ENCOUNTER — OFFICE VISIT (OUTPATIENT)
Dept: PHYSICAL THERAPY | Facility: CLINIC | Age: 76
End: 2021-10-21
Payer: MEDICARE

## 2021-10-21 DIAGNOSIS — R26.89 ABNORMALITY OF GAIT DUE TO IMPAIRMENT OF BALANCE: Primary | ICD-10-CM

## 2021-10-21 DIAGNOSIS — G62.9 PERIPHERAL POLYNEUROPATHY: ICD-10-CM

## 2021-10-21 PROCEDURE — 97110 THERAPEUTIC EXERCISES: CPT

## 2021-10-21 PROCEDURE — 97112 NEUROMUSCULAR REEDUCATION: CPT

## 2021-10-25 ENCOUNTER — OFFICE VISIT (OUTPATIENT)
Dept: PHYSICAL THERAPY | Facility: CLINIC | Age: 76
End: 2021-10-25
Payer: MEDICARE

## 2021-10-25 DIAGNOSIS — R26.89 ABNORMALITY OF GAIT DUE TO IMPAIRMENT OF BALANCE: Primary | ICD-10-CM

## 2021-10-25 DIAGNOSIS — G62.9 PERIPHERAL POLYNEUROPATHY: ICD-10-CM

## 2021-10-25 PROCEDURE — 97112 NEUROMUSCULAR REEDUCATION: CPT

## 2021-10-25 PROCEDURE — 97110 THERAPEUTIC EXERCISES: CPT

## 2021-10-27 ENCOUNTER — APPOINTMENT (OUTPATIENT)
Dept: PHYSICAL THERAPY | Facility: CLINIC | Age: 76
End: 2021-10-27
Payer: MEDICARE

## 2021-10-27 ENCOUNTER — OFFICE VISIT (OUTPATIENT)
Dept: PHYSICAL THERAPY | Facility: CLINIC | Age: 76
End: 2021-10-27
Payer: MEDICARE

## 2021-10-27 DIAGNOSIS — G62.9 PERIPHERAL POLYNEUROPATHY: ICD-10-CM

## 2021-10-27 DIAGNOSIS — R26.89 ABNORMALITY OF GAIT DUE TO IMPAIRMENT OF BALANCE: Primary | ICD-10-CM

## 2021-10-27 PROCEDURE — 97112 NEUROMUSCULAR REEDUCATION: CPT

## 2021-10-27 PROCEDURE — 97110 THERAPEUTIC EXERCISES: CPT

## 2021-10-28 ENCOUNTER — APPOINTMENT (OUTPATIENT)
Dept: PHYSICAL THERAPY | Facility: CLINIC | Age: 76
End: 2021-10-28
Payer: MEDICARE

## 2021-11-01 ENCOUNTER — OFFICE VISIT (OUTPATIENT)
Dept: PHYSICAL THERAPY | Facility: CLINIC | Age: 76
End: 2021-11-01
Payer: MEDICARE

## 2021-11-01 DIAGNOSIS — G62.9 PERIPHERAL POLYNEUROPATHY: ICD-10-CM

## 2021-11-01 DIAGNOSIS — R26.89 ABNORMALITY OF GAIT DUE TO IMPAIRMENT OF BALANCE: Primary | ICD-10-CM

## 2021-11-01 PROCEDURE — 97110 THERAPEUTIC EXERCISES: CPT

## 2021-11-01 PROCEDURE — 97112 NEUROMUSCULAR REEDUCATION: CPT

## 2021-11-03 ENCOUNTER — APPOINTMENT (OUTPATIENT)
Dept: PHYSICAL THERAPY | Facility: CLINIC | Age: 76
End: 2021-11-03
Payer: MEDICARE

## 2021-11-04 ENCOUNTER — APPOINTMENT (OUTPATIENT)
Dept: PHYSICAL THERAPY | Facility: CLINIC | Age: 76
End: 2021-11-04
Payer: MEDICARE

## 2021-11-08 ENCOUNTER — OFFICE VISIT (OUTPATIENT)
Dept: PHYSICAL THERAPY | Facility: CLINIC | Age: 76
End: 2021-11-08
Payer: MEDICARE

## 2021-11-08 DIAGNOSIS — G62.9 PERIPHERAL POLYNEUROPATHY: ICD-10-CM

## 2021-11-08 DIAGNOSIS — R26.89 ABNORMALITY OF GAIT DUE TO IMPAIRMENT OF BALANCE: Primary | ICD-10-CM

## 2021-11-08 PROCEDURE — 97112 NEUROMUSCULAR REEDUCATION: CPT | Performed by: PHYSICAL THERAPIST

## 2021-11-08 PROCEDURE — 97110 THERAPEUTIC EXERCISES: CPT | Performed by: PHYSICAL THERAPIST

## 2021-11-11 ENCOUNTER — EVALUATION (OUTPATIENT)
Dept: PHYSICAL THERAPY | Facility: CLINIC | Age: 76
End: 2021-11-11
Payer: MEDICARE

## 2021-11-11 DIAGNOSIS — G62.9 PERIPHERAL POLYNEUROPATHY: ICD-10-CM

## 2021-11-11 DIAGNOSIS — R26.89 ABNORMALITY OF GAIT DUE TO IMPAIRMENT OF BALANCE: Primary | ICD-10-CM

## 2021-11-11 PROCEDURE — 97530 THERAPEUTIC ACTIVITIES: CPT | Performed by: PHYSICAL THERAPIST

## 2021-11-11 PROCEDURE — 97112 NEUROMUSCULAR REEDUCATION: CPT | Performed by: PHYSICAL THERAPIST

## 2021-11-15 ENCOUNTER — APPOINTMENT (OUTPATIENT)
Dept: PHYSICAL THERAPY | Facility: CLINIC | Age: 76
End: 2021-11-15
Payer: MEDICARE

## 2021-11-18 ENCOUNTER — APPOINTMENT (OUTPATIENT)
Dept: PHYSICAL THERAPY | Facility: CLINIC | Age: 76
End: 2021-11-18
Payer: MEDICARE

## 2021-11-22 ENCOUNTER — APPOINTMENT (OUTPATIENT)
Dept: PHYSICAL THERAPY | Facility: CLINIC | Age: 76
End: 2021-11-22
Payer: MEDICARE

## 2021-11-24 ENCOUNTER — APPOINTMENT (OUTPATIENT)
Dept: PHYSICAL THERAPY | Facility: CLINIC | Age: 76
End: 2021-11-24
Payer: MEDICARE

## 2021-11-29 ENCOUNTER — APPOINTMENT (OUTPATIENT)
Dept: PHYSICAL THERAPY | Facility: CLINIC | Age: 76
End: 2021-11-29
Payer: MEDICARE

## 2022-02-02 ENCOUNTER — OFFICE VISIT (OUTPATIENT)
Dept: INTERNAL MEDICINE CLINIC | Age: 77
End: 2022-02-02
Payer: COMMERCIAL

## 2022-02-02 VITALS
BODY MASS INDEX: 20.45 KG/M2 | OXYGEN SATURATION: 97 % | HEIGHT: 64 IN | DIASTOLIC BLOOD PRESSURE: 86 MMHG | SYSTOLIC BLOOD PRESSURE: 144 MMHG | TEMPERATURE: 98.3 F | HEART RATE: 70 BPM | WEIGHT: 119.8 LBS

## 2022-02-02 DIAGNOSIS — F11.20 CONTINUOUS OPIOID DEPENDENCE (HCC): ICD-10-CM

## 2022-02-02 DIAGNOSIS — C50.512 MALIGNANT NEOPLASM OF LOWER-OUTER QUADRANT OF LEFT BREAST OF FEMALE, ESTROGEN RECEPTOR NEGATIVE (HCC): ICD-10-CM

## 2022-02-02 DIAGNOSIS — F33.1 MODERATE EPISODE OF RECURRENT MAJOR DEPRESSIVE DISORDER (HCC): Primary | ICD-10-CM

## 2022-02-02 DIAGNOSIS — E44.1 MILD PROTEIN-CALORIE MALNUTRITION (HCC): ICD-10-CM

## 2022-02-02 DIAGNOSIS — Z93.2 ILEOSTOMY STATUS (HCC): ICD-10-CM

## 2022-02-02 DIAGNOSIS — I48.0 PAROXYSMAL ATRIAL FIBRILLATION (HCC): ICD-10-CM

## 2022-02-02 DIAGNOSIS — Z17.1 MALIGNANT NEOPLASM OF LOWER-OUTER QUADRANT OF LEFT BREAST OF FEMALE, ESTROGEN RECEPTOR NEGATIVE (HCC): ICD-10-CM

## 2022-02-02 DIAGNOSIS — E21.0 HYPERPARATHYROID BONE DISEASE (HCC): ICD-10-CM

## 2022-02-02 PROCEDURE — 99204 OFFICE O/P NEW MOD 45 MIN: CPT | Performed by: INTERNAL MEDICINE

## 2022-02-02 RX ORDER — LOPERAMIDE HYDROCHLORIDE 2 MG/1
2 CAPSULE ORAL 4 TIMES DAILY PRN
Qty: 30 CAPSULE | Refills: 0 | Status: SHIPPED | OUTPATIENT
Start: 2022-02-02

## 2022-02-02 RX ORDER — ESCITALOPRAM OXALATE 10 MG/1
10 TABLET ORAL DAILY
Qty: 90 TABLET | Refills: 1 | Status: SHIPPED | OUTPATIENT
Start: 2022-02-02 | End: 2022-07-25 | Stop reason: SDUPTHER

## 2022-02-02 RX ORDER — OXYBUTYNIN CHLORIDE 5 MG/1
5 TABLET, EXTENDED RELEASE ORAL 2 TIMES DAILY
COMMUNITY
Start: 2021-11-05 | End: 2022-03-22 | Stop reason: SDUPTHER

## 2022-02-02 RX ORDER — DULOXETIN HYDROCHLORIDE 30 MG/1
30 CAPSULE, DELAYED RELEASE ORAL DAILY
Qty: 90 CAPSULE | Refills: 1 | Status: SHIPPED | OUTPATIENT
Start: 2022-02-02 | End: 2022-03-02 | Stop reason: ALTCHOICE

## 2022-02-02 RX ORDER — LOPERAMIDE HYDROCHLORIDE 2 MG/1
2 CAPSULE ORAL 4 TIMES DAILY PRN
Qty: 120 CAPSULE | Refills: 3 | Status: SHIPPED | OUTPATIENT
Start: 2022-02-02

## 2022-02-02 RX ORDER — AMOXICILLIN 500 MG/1
CAPSULE ORAL
COMMUNITY
Start: 2021-12-07

## 2022-02-02 RX ORDER — METOPROLOL SUCCINATE 25 MG/1
25 TABLET, EXTENDED RELEASE ORAL DAILY
COMMUNITY
Start: 2021-11-12

## 2022-02-02 NOTE — PROGRESS NOTES
Assessment/Plan:     Diagnoses and all orders for this visit:    Moderate episode of recurrent major depressive disorder (HCC)  -     escitalopram (Lexapro) 10 mg tablet; Take 1 tablet (10 mg total) by mouth daily Half a pill for 1 week then increase to the 1 pill after supper  -     DULoxetine (CYMBALTA) 30 mg delayed release capsule; Take 1 capsule (30 mg total) by mouth daily  -     loperamide (IMODIUM) 2 mg capsule; Take 1 capsule (2 mg total) by mouth 4 (four) times a day as needed for diarrhea    Ileostomy status (HCC)  -     loperamide (IMODIUM) 2 mg capsule; Take 1 capsule (2 mg total) by mouth 4 (four) times a day as needed for diarrhea    Mild protein-calorie malnutrition (HCC)  -     Vitamin D 25 hydroxy; Future  -     CALCIUM & CALCIUM IONIZED; Future  -     DXA bone density spine hip and pelvis; Future    Malignant neoplasm of lower-outer quadrant of left breast of female, estrogen receptor negative (HCC)    Continuous opioid dependence (HCC)    Paroxysmal atrial fibrillation (HCC)    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HCC)  -     Vitamin D 25 hydroxy; Future  -     DXA bone density spine hip and pelvis; Future    Other orders  -     metoprolol succinate (TOPROL-XL) 25 mg 24 hr tablet; Take 25 mg by mouth daily  -     oxybutynin (DITROPAN-XL) 5 mg 24 hr tablet; Take 5 mg by mouth 2 (two) times a day  -     amoxicillin (AMOXIL) 500 mg capsule; Prior to dental procedures          Depression Screening and Follow-up Plan: Patient's depression screening was positive with a PHQ-2 score of 5  Their PHQ-9 score was 14  Patient assessed for underlying major depression  Brief counseling provided and recommend additional follow-up/re-evaluation next office visit  Falls Plan of Care: balance, strength, and gait training instructions were provided  Vitamin D supplementation was recommended  M*Modal software was used to dictate this note    It may contain errors with dictating incorrect words or incorrect spelling  Please contact the provider directly with any questions  Subjective:   Chief Complaint   Patient presents with    Establish Care        Patient ID: Pippa Charlton is a 67 y o  female  HPI  This is a very pleasant 67 years young lady who is here today for the 1st time in the office for establishing care  Few problems she wanted to discuss with me today 1  Is a problems with the ileostomy and the diarrhea for which she was taking loperamide up to 6 tablets but the her recent doctor would not give her that many medication and that is why she was not happy I think we can give up to 1-4 tablets and it should not be any problem as she does not have any colon and it the may not cause toxic megacolon  Other problem is the ambulatory dysfunction secondary to peripheral neuropathy she use the walker and the very high risk for the fall I will order the DEXA scan for her  She was noted to have slightly high calcium ionized calcium was not checked and her PTH level was slightly elevated I will get DEXA scan and follow-up the calcium ionized calcium level before her next visit  The biggest problem is depression and anxiety since the loss of her  but she said that her problem with the depression is a recurrent and it was it note for many years she was getting duloxetine initially 30 mg which increased to 60 mg which does not help I will slowly get her off from duloxetine and put her on Lexapro 10 mg once a day for anxiety and depression and then follow-up I advised her to decrease her Cymbalta to 30 mg and start 5 mg of Lexapro increasing it to 10 minutes    Vitamin-D level will be checked and the patient will be followed up in about 1 month  The following portions of the patient's history were reviewed and updated as appropriate: allergies, current medications, past family history, past medical history, past social history, past surgical history and problem list     Review of Systems   Constitutional: Positive for fatigue  Negative for chills  HENT: Negative for congestion, ear pain, hearing loss, postnasal drip, sinus pressure, sore throat and voice change  Eyes: Negative for pain, discharge and visual disturbance  Respiratory: Negative for cough, chest tightness and shortness of breath  Cardiovascular: Negative for chest pain, palpitations and leg swelling  Gastrointestinal: Positive for constipation (Patient has the ileostomy after a C diff colitis and the total colon was removed she would like to go for we anastomosis she is followed up by the surgeon)  Negative for abdominal pain, blood in stool, diarrhea, nausea and rectal pain  Genitourinary: Negative for difficulty urinating, dysuria and urgency  Musculoskeletal: Negative for arthralgias and joint swelling  Skin: Negative for rash  Allergic/Immunologic: Negative for environmental allergies and food allergies  Neurological: Negative for dizziness, tremors, weakness, numbness and headaches  Hematological: Negative for adenopathy  Psychiatric/Behavioral: Positive for decreased concentration and dysphoric mood (Problem with depression for many years patient was given Cymbalta 60 mg which is not helping she is still pretty anxious also grieving on her  loss  Living alone, all problems contribute to her depression and anxiety  )  Negative for behavioral problems and hallucinations           Past Medical History:   Diagnosis Date    Back pain     Breast cancer ([address] ) 2012    C  difficile diarrhea     CVA (cerebral vascular accident) ([address] )     GERD (gastroesophageal reflux disease)     High blood pressure     Hx of cervical spine surgery 2011    Hypertension     Spondylosis     TIA (transient ischemic attack) 01/01/2011    Urinary incontinence          Current Outpatient Medications:     amoxicillin (AMOXIL) 500 mg capsule, Prior to dental procedures, Disp: , Rfl:     DULoxetine (CYMBALTA) 30 mg delayed release capsule, Take 1 capsule (30 mg total) by mouth daily, Disp: 90 capsule, Rfl: 1    loperamide (IMODIUM) 2 mg capsule, Take 1 capsule (2 mg total) by mouth 4 (four) times a day as needed for diarrhea, Disp: 120 capsule, Rfl: 3    metoprolol succinate (TOPROL-XL) 25 mg 24 hr tablet, Take 25 mg by mouth daily, Disp: , Rfl:     oxybutynin (DITROPAN-XL) 5 mg 24 hr tablet, Take 5 mg by mouth 2 (two) times a day, Disp: , Rfl:     simvastatin (ZOCOR) 20 mg tablet, Take 20 mg by mouth daily, Disp: , Rfl:     traMADol (ULTRAM) 50 mg tablet, Take 50 mg by mouth every 6 (six) hours as needed for moderate pain, Disp: , Rfl:     escitalopram (Lexapro) 10 mg tablet, Take 1 tablet (10 mg total) by mouth daily Half a pill for 1 week then increase to the 1 pill after supper, Disp: 90 tablet, Rfl: 1    loperamide (IMODIUM) 2 mg capsule, Take 1 capsule (2 mg total) by mouth 4 (four) times a day as needed for diarrhea, Disp: 30 capsule, Rfl: 0    No Known Allergies    Social History   Past Surgical History:   Procedure Laterality Date    BLADDER SUSPENSION      BREAST SURGERY      BREAST SURGERY Left 01/01/2012    CERVICAL SPINE SURGERY  05/01/2011    2 rods 13 screws    ILEOSTOMY  01/01/2013    REPLACEMENT TOTAL KNEE Left 01/01/2016    REPLACEMENT TOTAL KNEE BILATERAL      TOTAL KNEE ARTHROPLASTY Right 01/01/2013    TOTAL KNEE ARTHROPLASTY REVISION Right 01/01/2015     Family History   Problem Relation Age of Onset    Alzheimer's disease Mother     Alcohol abuse Brother        Objective:  /86 (BP Location: Left arm, Patient Position: Sitting, Cuff Size: Standard)   Pulse 70   Temp 98 3 °F (36 8 °C) (Temporal)   Ht 5' 4" (1 626 m)   Wt 54 3 kg (119 lb 12 8 oz)   SpO2 97%   BMI 20 56 kg/m²        Physical Exam  Constitutional:       Appearance: She is well-developed  HENT:      Right Ear: External ear normal       Mouth/Throat:      Mouth: Mucous membranes are dry     Eyes:      Conjunctiva/sclera: Conjunctivae normal       Pupils: Pupils are equal, round, and reactive to light  Neck:      Thyroid: No thyromegaly  Vascular: No JVD  Cardiovascular:      Rate and Rhythm: Normal rate and regular rhythm  Heart sounds: Normal heart sounds  Pulmonary:      Breath sounds: Normal breath sounds  Abdominal:      General: Bowel sounds are normal       Palpations: Abdomen is soft  Comments: Ileostomy is working well   Musculoskeletal:         General: Normal range of motion  Cervical back: Normal range of motion  Lymphadenopathy:      Cervical: No cervical adenopathy  Skin:     General: Skin is dry  Neurological:      General: No focal deficit present  Mental Status: She is alert and oriented to person, place, and time  Deep Tendon Reflexes: Reflexes are normal and symmetric     Psychiatric:         Mood and Affect: Mood normal          Behavior: Behavior normal

## 2022-02-07 ENCOUNTER — APPOINTMENT (OUTPATIENT)
Dept: LAB | Facility: MEDICAL CENTER | Age: 77
End: 2022-02-07
Payer: COMMERCIAL

## 2022-02-07 ENCOUNTER — HOSPITAL ENCOUNTER (OUTPATIENT)
Dept: RADIOLOGY | Facility: MEDICAL CENTER | Age: 77
Discharge: HOME | End: 2022-02-07
Payer: COMMERCIAL

## 2022-02-07 DIAGNOSIS — E21.0 HYPERPARATHYROID BONE DISEASE (HCC): ICD-10-CM

## 2022-02-07 DIAGNOSIS — E44.1 MILD PROTEIN-CALORIE MALNUTRITION (HCC): ICD-10-CM

## 2022-02-07 DIAGNOSIS — Z13.820 SCREENING FOR OSTEOPOROSIS: ICD-10-CM

## 2022-02-07 LAB
25(OH)D3 SERPL-MCNC: 17.4 NG/ML (ref 30–100)
CA-I BLD-SCNC: 1.22 MMOL/L (ref 1.12–1.32)
CALCIUM SERPL-MCNC: 10 MG/DL (ref 8.3–10.1)

## 2022-02-07 PROCEDURE — 77080 DXA BONE DENSITY AXIAL: CPT

## 2022-02-07 PROCEDURE — 82306 VITAMIN D 25 HYDROXY: CPT

## 2022-02-07 PROCEDURE — 36415 COLL VENOUS BLD VENIPUNCTURE: CPT

## 2022-02-07 PROCEDURE — 82330 ASSAY OF CALCIUM: CPT

## 2022-02-07 PROCEDURE — 82310 ASSAY OF CALCIUM: CPT

## 2022-02-08 DIAGNOSIS — E55.9 VITAMIN D DEFICIENCY: Primary | ICD-10-CM

## 2022-02-08 RX ORDER — ERGOCALCIFEROL 1.25 MG/1
50000 CAPSULE ORAL WEEKLY
Qty: 12 CAPSULE | Refills: 0 | Status: SHIPPED | OUTPATIENT
Start: 2022-02-08

## 2022-03-02 ENCOUNTER — TELEPHONE (OUTPATIENT)
Dept: INTERNAL MEDICINE CLINIC | Age: 77
End: 2022-03-02

## 2022-03-02 ENCOUNTER — OFFICE VISIT (OUTPATIENT)
Dept: INTERNAL MEDICINE CLINIC | Age: 77
End: 2022-03-02
Payer: COMMERCIAL

## 2022-03-02 VITALS
HEIGHT: 64 IN | WEIGHT: 122 LBS | SYSTOLIC BLOOD PRESSURE: 128 MMHG | HEART RATE: 64 BPM | BODY MASS INDEX: 20.83 KG/M2 | OXYGEN SATURATION: 99 % | DIASTOLIC BLOOD PRESSURE: 68 MMHG | TEMPERATURE: 97.5 F

## 2022-03-02 DIAGNOSIS — G89.29 CHRONIC GLUTEAL PAIN: ICD-10-CM

## 2022-03-02 DIAGNOSIS — Z93.2 ILEOSTOMY STATUS (HCC): ICD-10-CM

## 2022-03-02 DIAGNOSIS — M79.18 CHRONIC GLUTEAL PAIN: ICD-10-CM

## 2022-03-02 DIAGNOSIS — F33.1 MODERATE EPISODE OF RECURRENT MAJOR DEPRESSIVE DISORDER (HCC): Primary | ICD-10-CM

## 2022-03-02 DIAGNOSIS — M81.0 AGE-RELATED OSTEOPOROSIS WITHOUT CURRENT PATHOLOGICAL FRACTURE: ICD-10-CM

## 2022-03-02 DIAGNOSIS — E55.9 VITAMIN D DEFICIENCY: ICD-10-CM

## 2022-03-02 PROCEDURE — 1036F TOBACCO NON-USER: CPT | Performed by: STUDENT IN AN ORGANIZED HEALTH CARE EDUCATION/TRAINING PROGRAM

## 2022-03-02 PROCEDURE — 99213 OFFICE O/P EST LOW 20 MIN: CPT | Performed by: STUDENT IN AN ORGANIZED HEALTH CARE EDUCATION/TRAINING PROGRAM

## 2022-03-02 PROCEDURE — 1160F RVW MEDS BY RX/DR IN RCRD: CPT | Performed by: STUDENT IN AN ORGANIZED HEALTH CARE EDUCATION/TRAINING PROGRAM

## 2022-03-02 NOTE — PROGRESS NOTES
INTERNAL MEDICINE FOLLOW-UP OFFICE VISIT  [facility]    NAME: Holly Hernandez  AGE: 67 y o  SEX: female    DATE OF ENCOUNTER: 3/2/2022    Assessment and Plan     1  Moderate episode of recurrent major depressive disorder (HCC)  Overall improving on Lexapro  Patient is to continue Lexapro 10 mg daily and discontinue Cymbalta at this time  We will continue following her symptoms closely at subsequent visits  2  Ileostomy status ([address] )  Patient advised to continue utilizing Imodium as needed to help control ostomy output  3  Age-related osteoporosis without current pathological fracture  Most recent DEXA scan indicates osteoporosis with a T-score of -3 0 at the femoral neck and -2 7 at the hip  Patient was found to be profoundly vitamin-D deficient as indicated below  Her most recent calcium levels are within normal limits  She was counseled extensively on the natural history of osteoporosis and its management strategies  We reviewed adequate dietary calcium intake  We reviewed that we are treating her vitamin-D deficiency  We reviewed that she would benefit from weight-bearing exercise and for this we have referred her to physical therapy  We also reviewed that she would benefit from bisphosphonate therapy  We will have office staff speak with the patient's insurance regarding Prolia injections  - Ambulatory Referral to Physical Therapy; Future    4  Chronic gluteal pain  Signs and symptoms do not appear consistent with true sciatic pain  Patient appears to be suffering from occasional bouts of muscular gluteal pain  No indication for imaging at this time  No red flag symptoms reported  Will send patient to physical therapy for evaluation and management  - Ambulatory Referral to Physical Therapy; Future    5  Vitamin-D deficiency  Most recent vitamin-D level 17 4    Patient was prescribed vitamin D2/ergocalciferol 50,000 units weekly but unfortunately has been only taking this medication on a monthly basis because of a mistake printed on her prescription  She was informed that she should take this medication weekly as prescribed for total of 12 weeks  After that time, we will recheck her vitamin-D level and consider down titrating her supplementation  Orders Placed This Encounter   Procedures    Ambulatory Referral to Physical Therapy       - Counseling Documentation: patient was counseled regarding: diagnostic results, instructions for management, risk factor reductions, prognosis, patient and family education, impressions, risks and benefits of treatment options and importance of compliance with treatment  - Counseling Time: counseling time more than 50% of visit: 30 minutes  - Medication Side Effects: Adverse side effects of medications were reviewed with the patient/guardian today  Routine Health Maintenance:     Advised diet and exercise  Advised to refrain from tobacco, alcohol, and illicit drug use  Advised medical compliance      OMT/OPP: During the course of my history and physical, I utilized osteopathic examination modalities to assess the patient  Somatic dysfunctions were not identified during this visit  OMT is not indicated at this time  Chief Complaint     Chief Complaint   Patient presents with    Follow-up     one month fu review shelbie 2/7/22- pt wants to discuss frequency of vit d2 pills        History of Present Illness     Jonas Metz is a 67 y o  female with past significant depression, treated breast cancer, with an ostomy in place following complications of C diff colitis presents follow-up  Regarding her depression and anxiety, she tells me that overall she feels improved from her last visit since she has begun Lexapro  She states she has more energy and drive to do things during her day than she did previously  She tells me she has uptitrated her Lexapro to 10 mg daily and is still on the down titrated dose of Cymbalta at 30 mg daily      Following her last visit, vitamin-D level was checked and found to be significantly low  She was started on ergocalciferol 82176 units weekly but tells me she has only taken this medication once since the prescription was sent because the instructions on her prescription say to take this medication once per month  Review of the signature applied to the prescription from our office shows that this medication was sent as once weekly  Patient tells me that she is unaware of when she last had a DEXA scan and thinks it was of roughly 15 years ago  She is unclear of her results  After her last visit with our office, she received a DEXA scan which showed osteoporosis  Patient has no history of fracture to her knowledge  Patient also complains of pain in her left leg near her glute that she calls [address]  She states she has had this pain chronically for at least the last 10 years  She says she had an MRI in the past but does not remember the results  She says she did go to physical therapy for this pain at 1 point in the past and did note improvement  She says the pain is occasional and intermittent and of moderate intensity  She states the pain is localized to her left glute and sometimes radiates partially down her left thigh  She denies any luis antonio low back pain nor does she endorse any symptoms concerning for radicular low back pain  She states her ostomy is functioning well  She says after her last visit, she is taking Imodium, 2 pills in the morning and 2 pills in the evening with good effect on her output  The following portions of the patient's history were reviewed and updated as appropriate: allergies, current medications, past family history, past medical history, past social history, past surgical history and problem list     Review of Systems     Review of Systems   Constitutional: Negative for chills, fatigue and fever     HENT: Negative for congestion, postnasal drip, rhinorrhea, sinus pressure, sinus pain and sore throat  Respiratory: Negative for cough, shortness of breath and wheezing  Cardiovascular: Negative for chest pain and palpitations  Gastrointestinal: Negative for abdominal pain, constipation, diarrhea, nausea and vomiting  Genitourinary: Negative for difficulty urinating, dysuria, frequency, hematuria and urgency  Musculoskeletal: Positive for myalgias  Negative for arthralgias, back pain and gait problem  Skin: Negative for pallor, rash and wound  Neurological: Negative for dizziness, weakness, light-headedness, numbness and headaches  Psychiatric/Behavioral: Positive for dysphoric mood  The patient is nervous/anxious  Active Problem List     Patient Active Problem List   Diagnosis    Laceration with foreign body of right hand, initial encounter    Unspecified abnormalities of gait and mobility    Peripheral polyneuropathy    Hx of fusion of cervical spine    Physical deconditioning    Ileostomy status (HCC)    Mild protein-calorie malnutrition (HCC)    Malignant neoplasm of lower-outer quadrant of left breast of female, estrogen receptor negative ([address] )    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[address] )    Vitamin D deficiency    Age-related osteoporosis without current pathological fracture       Objective     /68   Pulse 64   Temp 97 5 °F (36 4 °C) (Temporal)   Ht 5' 4" (1 626 m)   Wt 55 3 kg (122 lb)   SpO2 99%   BMI 20 94 kg/m²     Physical Exam  Vitals and nursing note reviewed  Constitutional:       General: She is not in acute distress  Appearance: Normal appearance  She is normal weight  She is not ill-appearing, toxic-appearing or diaphoretic  HENT:      Head: Normocephalic and atraumatic  Eyes:      General: No scleral icterus  Extraocular Movements: Extraocular movements intact  Conjunctiva/sclera: Conjunctivae normal       Pupils: Pupils are equal, round, and reactive to light     Cardiovascular:      Rate and Rhythm: Normal rate and regular rhythm  Pulses: Normal pulses  Heart sounds: Normal heart sounds  No murmur heard  No friction rub  No gallop  Pulmonary:      Effort: Pulmonary effort is normal  No respiratory distress  Breath sounds: Normal breath sounds  No stridor  No wheezing or rhonchi  Abdominal:      General: The ostomy site is clean  Bowel sounds are normal  There is no distension  Palpations: Abdomen is soft  There is no mass  Tenderness: There is no abdominal tenderness  There is no guarding or rebound  Hernia: No hernia is present  Musculoskeletal:         General: No swelling, tenderness or deformity  Cervical back: Neck supple  Lymphadenopathy:      Cervical: No cervical adenopathy  Skin:     General: Skin is warm and dry  Capillary Refill: Capillary refill takes less than 2 seconds  Coloration: Skin is not pale  Findings: No erythema or rash  Neurological:      General: No focal deficit present  Mental Status: She is alert  Psychiatric:         Mood and Affect: Mood normal          Behavior: Behavior normal          Thought Content: Thought content normal          Judgment: Judgment normal          Pertinent Laboratory/Diagnostic Studies:  DXA bone density spine hip and pelvis    Result Date: 2/7/2022  Impression: 1  Osteoporosis  2   The 10 year risk of hip fracture is 8 2% with the 10 year risk of major osteoporotic fracture being 20% as calculated by the [facility]/WHO fracture risk assessment tool (FRAX)  3   The current NOF guidelines recommend treating patients with a T-score of -2 5 or less in the lumbar spine or hips, or in post-menopausal women and men over the age of 48 with low bone mass (osteopenia) and a FRAX 10 year risk score of >3% for hip fracture and/or >20% for major osteoporotic fracture  4   The NOF recommends follow-up DXA in 1-2 years after initiating therapy for osteoporosis and every 2 years thereafter   More frequent evaluation is appropriate for patients with conditions associated with rapid bone loss, such as glucocorticoid therapy  The interval between DXA screenings may be longer for individuals without major risk factors and initial T-score in the normal or upper low bone mass range  The FRAX algorithm has certain limitations: -FRAX has not been validated in patients currently or previously treated with pharmacotherapy for osteoporosis  In such patients, clinical judgment must be exercised in interpreting FRAX scores  -Prior hip, vertebral and humeral fragility fractures appear to confer greater risk of subsequent fracture than fractures at other sites (this is especially true for individuals with severe vertebral fractures), but quantification of this incremental risk is not possible with FRAX  -FRAX underestimates fracture risk in patients with history of multiple fragility fractures  -FRAX may underestimate fracture risk in patients with history of frequent falls  -It is not appropriate to use FRAX to monitor treatment response   WHO CLASSIFICATION: Normal (a T-score of -1 0 or higher) Low bone mineral density (a T-score of less than -1 0 but higher than -2 5) Osteoporosis (a T-score of -2 5 or less) Severe osteoporosis (a T-score of -2 5 or less with a fragility fracture) Workstation performed: [id number]       Images and diagnostics reviewed     Current Medications     Current Outpatient Medications:     amoxicillin (AMOXIL) 500 mg capsule, Prior to dental procedures, Disp: , Rfl:     ergocalciferol (VITAMIN D2) 50,000 units, Take 1 capsule (50,000 Units total) by mouth once a week, Disp: 12 capsule, Rfl: 0    escitalopram (Lexapro) 10 mg tablet, Take 1 tablet (10 mg total) by mouth daily Half a pill for 1 week then increase to the 1 pill after supper (Patient taking differently: Take 10 mg by mouth daily One pill daily ), Disp: 90 tablet, Rfl: 1    loperamide (IMODIUM) 2 mg capsule, Take 1 capsule (2 mg total) by mouth 4 (four) times a day as needed for diarrhea, Disp: 30 capsule, Rfl: 0    metoprolol succinate (TOPROL-XL) 25 mg 24 hr tablet, Take 25 mg by mouth daily, Disp: , Rfl:     oxybutynin (DITROPAN-XL) 5 mg 24 hr tablet, Take 5 mg by mouth 2 (two) times a day, Disp: , Rfl:     simvastatin (ZOCOR) 20 mg tablet, Take 20 mg by mouth daily, Disp: , Rfl:     traMADol (ULTRAM) 50 mg tablet, Take 50 mg by mouth every 6 (six) hours as needed for moderate pain, Disp: , Rfl:     loperamide (IMODIUM) 2 mg capsule, Take 1 capsule (2 mg total) by mouth 4 (four) times a day as needed for diarrhea (Patient not taking: Reported on 3/2/2022 ), Disp: 120 capsule, Rfl: 3    Health Maintenance     Health Maintenance   Topic Date Due    Hepatitis C Screening  Never done   [employer] Annual Wellness Visit (AWV)  Never done    Fall Risk  09/13/2022    Depression Screening  02/02/2023    Depression Follow-up Plan  02/02/2023    Falls: Plan of Care  02/02/2023    BMI: Adult  03/02/2023    DTaP,Tdap,and Td Vaccines (2 - Td or Tdap) 11/01/2029    Osteoporosis Screening  Completed    Pneumococcal Vaccine: 65+ Years  Completed    Influenza Vaccine  Completed    COVID-19 Vaccine  Completed    HIB Vaccine  Aged Out    Hepatitis B Vaccine  Aged Out    IPV Vaccine  Aged Out    Hepatitis A Vaccine  Aged Out    Meningococcal ACWY Vaccine  Aged Out    HPV Vaccine  Aged Out     Immunization History   Administered Date(s) Administered    COVID-19 PFIZER VACCINE 0 3 ML IM 02/14/2021, 03/07/2021, 11/15/2021    INFLUENZA 09/28/2015, 11/03/2016, 11/06/2017, 11/25/2017, 09/13/2018, 09/27/2019, 09/21/2020, 11/29/2021    Influenza, Seasonal Vaccine, Quadrivalent, Adjuvanted,  5e 09/21/2020, 11/29/2021    Pneumococcal Conjugate 13-Valent 11/06/2017    Pneumococcal Polysaccharide PPV23 08/07/2016    Tdap 11/01/2019    Tetanus, adsorbed 07/27/1998       Portions of this note may have been created with voice recognition software  Occasional wrong word or sound a like substitutions may have occurred due to inherent limitations of voice recognition software  Read the chart carefully and recognize, using context, where substitutions have occurred  Global time spent on encounter: 30 minutes    YARELIS Christianson  Internal Medicine PGY-3  [address] [facility] [place] [place] , [address] [address] [facility] [address] [facility]  Office: [phone]  Fax: [phone]

## 2022-03-02 NOTE — PATIENT INSTRUCTIONS
For your vitamin-D deficiency, please take your vitamin D2/ergocalciferol 12040 units once per week for 12 weeks in total     For your osteoporosis, we are recommending bisphosphonate therapy with Prolia injections  We will speak with your insurance company regarding coverage for these injections and contact you when this has been approved  In the meantime, we will refer you to physical therapy for strength training exercises  For your depression and anxiety, please discontinue your Cymbalta and only continue with your Lexapro 10 mg daily

## 2022-03-17 ENCOUNTER — EVALUATION (OUTPATIENT)
Dept: PHYSICAL THERAPY | Facility: CLINIC | Age: 77
End: 2022-03-17
Payer: COMMERCIAL

## 2022-03-17 DIAGNOSIS — M79.18 CHRONIC GLUTEAL PAIN: Primary | ICD-10-CM

## 2022-03-17 DIAGNOSIS — G89.29 CHRONIC GLUTEAL PAIN: Primary | ICD-10-CM

## 2022-03-17 DIAGNOSIS — M81.0 AGE-RELATED OSTEOPOROSIS WITHOUT CURRENT PATHOLOGICAL FRACTURE: ICD-10-CM

## 2022-03-17 PROCEDURE — 97162 PT EVAL MOD COMPLEX 30 MIN: CPT | Performed by: PHYSICAL THERAPIST

## 2022-03-17 NOTE — PROGRESS NOTES
PT Evaluation     Today's date: 3/17/2022  Patient name: Evita Sargent  :   MRN: [id number]  Referring provider: Julio Aguilar DO  Dx:   Encounter Diagnosis     ICD-10-CM    1  Chronic gluteal pain  M79 18 Ambulatory Referral to Physical Therapy    G89 29 PT plan of care cert/re-cert   2  Age-related osteoporosis without current pathological fracture  M81 0 Ambulatory Referral to Physical Therapy     PT plan of care cert/re-cert       Start Time: 1100  Stop Time: 1145  Total time in clinic (min): 45 minutes    Assessment  Assessment details: Evita Sargent is a pleasant 67 y o  female who presents with chronic L glute pain and osteopenia that would benefit from weightbearing exercise  The patient's greatest concerns are fear of not being able to keep active  No further referral appears necessary at this time based upon examination results  Primary movement impairment diagnosis of generalized weakness resulting in pathoanatomical symptoms of Chronic gluteal pain  (primary encounter diagnosis)  Age-related osteoporosis without current pathological fracture and limiting her ability to exercise or recreation, lift, perform household chores, squat to  objects from the floor and stoop  Impairments include:  1) Generalized weakness BUE and BLE  2) Decreased ROM of lumbar spine  3) Tightness of glute mm    Discussed risks, benefits, and alternatives to treatment, and answered all patient questions to patient satisfaction    Impairments: abnormal gait, abnormal muscle firing, abnormal muscle tone, abnormal or restricted ROM, abnormal movement, activity intolerance, impaired balance, impaired physical strength, lacks appropriate home exercise program, pain with function and poor posture   Understanding of Dx/Px/POC: good   Prognosis: fair    Goals  Impairment Goals 4-6 weeks  - Decrease pain to <3/10  - Improve lumbar AROM to >50% throughout  - Increase global LE strength to 4/5 throughout  - Improve global UE strength to 4/5 throughout  - Increase core strength to be able to sit straight up without deviation    Functional Goals 6-8 weeks  - Return to Prior Level of Function  - Patient will be independent with HEP  - Patient will be able to perform sit to stand without increased pain/compensation/difficulty   - Patient will be able to bend forward without increased pain/compensation/difficulty   - Patient will be able to return to the gym confidently        Plan  Plan details: Prognosis above is given PT services 2x/week tapering to 1x/week over the next 2 months and home program adherence  Patient would benefit from: skilled physical therapy  Planned modality interventions: cryotherapy, electrical stimulation/Russian stimulation, high voltage pulsed current: pain management, high voltage pulsed current: spasm management, H-Wave, TENS, thermotherapy: hydrocollator packs and unattended electrical stimulation  Planned therapy interventions: abdominal trunk stabilization, behavior modification, body mechanics training, breathing training, flexibility, functional ROM exercises, home exercise program, joint mobilization, manual therapy, massage, Talavera taping, muscle pump exercises, neuromuscular re-education, patient education, postural training, strengthening, stretching, therapeutic activities, therapeutic exercise and therapeutic training  Frequency: 2x week  Duration in weeks: 8  Treatment plan discussed with: patient        Subjective Evaluation    History of Present Illness  Mechanism of injury: PLOF: Uses FWW at baseline  HISTORY OF CURRENT INJURY:  Patient went back to PCP because of depression and anxiety, and he was not helpful  He referred her to a psychologist  She was not thrilled with this, so she changed PCPs and she was very happy with this  This provider sent her for a DEXA scan and changed her meds  She has osteopenia, and may start an injection for this   She was recommended to do weightbearing exercises to encourage bone density, and wants to go to the gym  She is here at  for this, and wants to get the the gym as soon as possible  She has chronic glute and sciatic pain as well (years), and has not gotten an MRI for years  Her glute pain can be severe, and travels down both her legs to the knee  She takes tramadol for this and the sciatic pain  PAIN LOCATION/DESCRIPTORS: [place] glute pain to knee  AGGRAVATING FACTORS:  Bending forward, stooping, stretching L glute  EASES: tramadol, sitting, standing  DAY PATTERN: with bending only  IMAGING: None recently  SPECIAL QUESTIONS:  Hx of cancer  PATIENT GOALS: Patient wishes to get back to the gym to strengthen herself  Patient wishes to get rid of her glute pain    Pain  Current pain ratin  At best pain ratin  At worst pain ratin  Location: L glute into hamstring  Quality: sharp  Progression: no change    Patient Goals  Patient goals for therapy: decreased pain, increased motion, increased strength, independence with ADLs/IADLs and return to sport/leisure activities          Objective     Active Range of Motion     Lumbar   Flexion: 55 degrees  with pain  Extension: -15 degrees  Restriction level: maximal  Left lateral flexion:  with pain Restriction level: maximal  Right lateral flexion:  Restriction level: maximal  Left rotation:  Restriction level: minimal  Right rotation:  Restriction level: minimal    Strength/Myotome Testing     Left Shoulder     Planes of Motion   Flexion: 4   Abduction: 4   External rotation at 0°: 4-     Right Shoulder     Planes of Motion   Flexion: 3+   Abduction: 3+   External rotation at 0°: 4-     Left Elbow   Flexion: 4-  Extension: 4-    Right Elbow   Flexion: 4-  Extension: 4-    Left Hip   Planes of Motion   Flexion: 3+  Abduction: 3  Adduction: 4-  External rotation: 3+  Internal rotation: 3+    Right Hip   Planes of Motion   Flexion: 3+  Abduction: 3  Adduction: 4-  External rotation: 3+  Internal rotation: 3+    Left Knee   Flexion: 4-  Extension: 4    Right Knee   Flexion: 4-  Extension: 4    Tests     Lumbar     Left   Negative passive SLR and slump test      Right   Negative passive SLR and slump test      Left Pelvic Girdle/Sacrum   Negative: thigh thrust and active SLR test      Right Pelvic Girdle/Sacrum   Negative: thigh thrust and active SLR test      Left Hip   Positive HAVEN and FADIR  Right Hip   Negative HAVEN and FADIR       Additional Tests Details  Pain with passive hip flexion bilaterally at end ranges, improved with reps  Tightness in HS and piriformis bilaterally  No TTP of piriformis      General Comments:      Lumbar Comments  30 sec STS: 7 reps using handles  TU sec  Sit to stand: pain, uses both hands to stand             Diagnosis: Osteoporosis, scarlet glute pain   Precautions: FWW for mobility, hx of CA   Primary Goals: 1) Generalized weakness BUE and BLE  2) Decreased ROM of lumbar spine  3) Tightness of glute mm   *asterisks by exercise = given for HEP   Manuals 3/17                                               There Ex        Bike/TM        piriformis S        Seated HS S        SKTC                                                Neuro Re-Ed        Mini squats        3 way SLR        Mini lunges        Standing heel raises        tricep presses in chair        Bicep/tricep with bands        TB row/ext        DB forward and lateral raises        Lateral step ups        Clams         bridges                 Re-evaluation              Ther Act              sit to stands              step ups             Modalities

## 2022-03-18 DIAGNOSIS — F33.1 MODERATE EPISODE OF RECURRENT MAJOR DEPRESSIVE DISORDER (HCC): ICD-10-CM

## 2022-03-18 RX ORDER — ESCITALOPRAM OXALATE 10 MG/1
10 TABLET ORAL DAILY
Qty: 90 TABLET | Refills: 0 | Status: CANCELLED | OUTPATIENT
Start: 2022-03-18

## 2022-03-22 DIAGNOSIS — N32.81 OAB (OVERACTIVE BLADDER): Primary | ICD-10-CM

## 2022-03-22 RX ORDER — OXYBUTYNIN CHLORIDE 5 MG/1
5 TABLET, EXTENDED RELEASE ORAL 2 TIMES DAILY
Qty: 180 TABLET | Refills: 0 | Status: SHIPPED | OUTPATIENT
Start: 2022-03-22

## 2022-03-22 RX ORDER — TRAMADOL HYDROCHLORIDE 50 MG/1
50 TABLET ORAL EVERY 6 HOURS PRN
Status: CANCELLED | OUTPATIENT
Start: 2022-03-22

## 2022-03-22 NOTE — TELEPHONE ENCOUNTER
Tramadol previously filled by old PCP  Pt  Was getting 80 tablets monthly        Please advise    Thank you

## 2022-03-22 NOTE — TELEPHONE ENCOUNTER
[id number] 01/17/2022 01/17/2022 traMADol HCL (Tablet)  80 0 10 50 MG 40 0 [facility]  [employer] 00 / 0 [place]    1  [id number] 12/20/2021 12/20/2021 traMADol HCL (Tablet)  80 0 10 50 MG 40 0 [facility]  [employer] 00 / 0 [place]    1  [id number] 11/29/2021 11/29/2021 traMADol HCL (Tablet)  80 0 10 50 MG 40 0 [facility]  [employer] 00 / 0 [place]    1  [id number] 10/12/2021  10/12/2021 traMADol HCL (Tablet)  80 0 10 50 MG 40 0 [facility]  [employer] 00 / 0 [place]    1  [id number] 09/13/2021 09/13/2021 traMADol HCL (Tablet)  80 0 10 50 MG 40 0 [facility]  [employer] 00 / 0 [place]    1  [id number] 08/28/2021 08/27/2021 traMADol HCL (Tablet)  80 0 10 50 MG 40 0 [facility]  [employer] 00 / 0 [place]    1  [id number] 07/20/2021 07/20/2021 traMADol HCL (Tablet)  80 0 10 50 MG 40 0 [facility]  [employer] 00 / 0 [place]    1  [id number] 06/21/2021 06/21/2021 traMADol HCL (Tablet)  80 0 10 50 MG 40 0 [facility]  [employer] 00 / 0 [place]    1  [id number] 05/11/2021 05/11/2021 traMADol HCL (Tablet)  80 0 10 50 MG 40 0 [facility]    [employer] 00 / 0 [place]    1  [id number] 04/08/2021 04/08/2021 traMADol HCL (Tablet)  80 0 10 50 MG 40 0 [facility]

## 2022-03-23 ENCOUNTER — OFFICE VISIT (OUTPATIENT)
Dept: PHYSICAL THERAPY | Facility: CLINIC | Age: 77
End: 2022-03-23
Payer: COMMERCIAL

## 2022-03-23 DIAGNOSIS — G89.29 CHRONIC GLUTEAL PAIN: Primary | ICD-10-CM

## 2022-03-23 DIAGNOSIS — M81.0 AGE-RELATED OSTEOPOROSIS WITHOUT CURRENT PATHOLOGICAL FRACTURE: ICD-10-CM

## 2022-03-23 DIAGNOSIS — M79.18 CHRONIC GLUTEAL PAIN: Primary | ICD-10-CM

## 2022-03-23 PROCEDURE — 97112 NEUROMUSCULAR REEDUCATION: CPT

## 2022-03-23 PROCEDURE — 97110 THERAPEUTIC EXERCISES: CPT

## 2022-03-23 NOTE — PROGRESS NOTES
Daily Note     Today's date: 3/23/2022  Patient name: Julio C Sims  :   MRN: [id number]  Referring provider: Flory Goldman DO  Dx:   Encounter Diagnosis     ICD-10-CM    1  Chronic gluteal pain  M79 18     G89 29    2  Age-related osteoporosis without current pathological fracture  M81 0                   Subjective: Patient has no new complaints to offer since her initial evaluation  Objective: See treatment diary below      Assessment: Initiated outlined program  Patient was able to begin with warm up on bike without resistance  She was able to perform sitting stretches which were updated in HEP with good understanding  She is challenged with standing without UE support, used table for assistance to perform LE strengthening as noted  Will continue to progress as she is able to tolerate  Plan: Progress treatment as tolerated         Diagnosis: Osteoporosis, scarlet glute pain   Precautions: FWW for mobility, hx of CA   Primary Goals: 1) Generalized weakness BUE and BLE  2) Decreased ROM of lumbar spine  3) Tightness of glute mm   *asterisks by exercise = given for HEP   Manuals 3/17 3/23                                              There Ex        Bike/TM  5 min       piriformis S        Seated HS S*  3x30 sec       SKTC        Figure 4 S*  3x30 sec                                       Neuro Re-Ed        Mini squats        3 way SLR*  2x10 ea       Mini lunges        Standing heel raises        tricep presses in chair (long armed chair)  2x10   Little leg assist       Bicep/tricep with bands        TB row/ext        DB forward and lateral raises        Lateral step ups        Clams         bridges        Leg Press   35# 2x10       Leg Press HR  35# 2x10                Re-evaluation              Ther Act              sit to stands              step ups             Modalities

## 2022-03-30 ENCOUNTER — APPOINTMENT (OUTPATIENT)
Dept: PHYSICAL THERAPY | Facility: CLINIC | Age: 77
End: 2022-03-30
Payer: COMMERCIAL

## 2022-04-06 ENCOUNTER — RA CDI HCC (OUTPATIENT)
Dept: OTHER | Facility: HOSPITAL | Age: 77
End: 2022-04-06

## 2022-04-06 NOTE — PROGRESS NOTES
[facility]  coding opportunities       Chart reviewed, no opportunity found:   [address]        Patients Insurance     Medicare Insurance: [facility]

## 2022-04-07 ENCOUNTER — OFFICE VISIT (OUTPATIENT)
Dept: PHYSICAL THERAPY | Facility: CLINIC | Age: 77
End: 2022-04-07
Payer: COMMERCIAL

## 2022-04-07 DIAGNOSIS — M81.0 AGE-RELATED OSTEOPOROSIS WITHOUT CURRENT PATHOLOGICAL FRACTURE: ICD-10-CM

## 2022-04-07 DIAGNOSIS — M79.18 CHRONIC GLUTEAL PAIN: Primary | ICD-10-CM

## 2022-04-07 DIAGNOSIS — G89.29 CHRONIC GLUTEAL PAIN: Primary | ICD-10-CM

## 2022-04-07 PROCEDURE — 97110 THERAPEUTIC EXERCISES: CPT

## 2022-04-07 PROCEDURE — 97112 NEUROMUSCULAR REEDUCATION: CPT

## 2022-04-07 NOTE — PROGRESS NOTES
Daily Note     Today's date: 2022  Patient name: Live Maza  : [id number]  MRN: [id number]  Referring provider: Cami Lyons DO  Dx:   Encounter Diagnosis     ICD-10-CM    1  Chronic gluteal pain  M79 18     G89 29    2  Age-related osteoporosis without current pathological fracture  M81 0        Start Time: 1400  Stop Time: 1445  Total time in clinic (min): 45 minutes    Subjective: Patient states she felt sore following last session  She got sick for over a week and did not do any exercises  Objective: See treatment diary below      Assessment: Continued with outlined program  Patient was able to progress with strengthening exercises as noted  She performed UE strengthening with moderate muscle fatigue  Patient challenged with stability without UE support  Updated HEP with good understanding  Plan: Progress treatment as tolerated         Diagnosis: Osteoporosis, scarlet glute pain   Precautions: FWW for mobility, hx of CA   Primary Goals: 1) Generalized weakness BUE and BLE  2) Decreased ROM of lumbar spine  3) Tightness of glute mm   *asterisks by exercise = given for HEP   Manuals 3/17 3/23 4/7                                             There Ex        Bike/TM  5 min       UBE    5 min      piriformis S*   3x30 sec      Seated HS S*  3x30 sec       SKTC*   10x10 sec      Figure 4 S*  3x30 sec                                       Neuro Re-Ed        Mini squats   2x10      3 way SLR*  2x10 ea  2x10 ea      Mini lunges        Standing heel raises   2x10      tricep presses in chair (long armed chair)  2x10   Little leg assist       Bicep/tricep with bands   RTB 2x10 ea     TB row/ext   RTB 2x10 ea     DB forward and lateral raises        Lateral step ups        Clams         bridges   2x10     Leg Press   35# 2x10       Leg Press HR  35# 2x10                Re-evaluation              Ther Act              sit to stands              step ups             Modalities

## 2022-04-13 ENCOUNTER — OFFICE VISIT (OUTPATIENT)
Dept: INTERNAL MEDICINE CLINIC | Age: 77
End: 2022-04-13
Payer: COMMERCIAL

## 2022-04-13 VITALS
TEMPERATURE: 98 F | DIASTOLIC BLOOD PRESSURE: 78 MMHG | SYSTOLIC BLOOD PRESSURE: 144 MMHG | OXYGEN SATURATION: 99 % | WEIGHT: 120.7 LBS | BODY MASS INDEX: 20.61 KG/M2 | HEART RATE: 64 BPM | HEIGHT: 64 IN

## 2022-04-13 DIAGNOSIS — E55.9 VITAMIN D DEFICIENCY: ICD-10-CM

## 2022-04-13 DIAGNOSIS — E44.1 MILD PROTEIN-CALORIE MALNUTRITION (HCC): ICD-10-CM

## 2022-04-13 DIAGNOSIS — Z17.1 MALIGNANT NEOPLASM OF LOWER-OUTER QUADRANT OF LEFT BREAST OF FEMALE, ESTROGEN RECEPTOR NEGATIVE (HCC): ICD-10-CM

## 2022-04-13 DIAGNOSIS — N39.3 STRESS INCONTINENCE: ICD-10-CM

## 2022-04-13 DIAGNOSIS — Z93.2 ILEOSTOMY STATUS (HCC): Primary | ICD-10-CM

## 2022-04-13 DIAGNOSIS — M81.0 AGE-RELATED OSTEOPOROSIS WITHOUT CURRENT PATHOLOGICAL FRACTURE: ICD-10-CM

## 2022-04-13 DIAGNOSIS — C50.512 MALIGNANT NEOPLASM OF LOWER-OUTER QUADRANT OF LEFT BREAST OF FEMALE, ESTROGEN RECEPTOR NEGATIVE (HCC): ICD-10-CM

## 2022-04-13 DIAGNOSIS — E21.0 HYPERPARATHYROID BONE DISEASE (HCC): ICD-10-CM

## 2022-04-13 PROBLEM — R53.81 PHYSICAL DECONDITIONING: Status: RESOLVED | Noted: 2021-03-16 | Resolved: 2022-04-13

## 2022-04-13 PROCEDURE — 1160F RVW MEDS BY RX/DR IN RCRD: CPT | Performed by: INTERNAL MEDICINE

## 2022-04-13 PROCEDURE — 99214 OFFICE O/P EST MOD 30 MIN: CPT | Performed by: INTERNAL MEDICINE

## 2022-04-13 PROCEDURE — 1036F TOBACCO NON-USER: CPT | Performed by: INTERNAL MEDICINE

## 2022-04-13 RX ORDER — IBANDRONATE SODIUM 150 MG/1
150 TABLET, FILM COATED ORAL
Qty: 3 TABLET | Refills: 3 | Status: SHIPPED | OUTPATIENT
Start: 2022-04-13

## 2022-04-13 NOTE — PROGRESS NOTES
Assessment/Plan:     Diagnoses and all orders for this visit:    Ileostomy status ([facility] )  No Problem  Mild protein-calorie malnutrition ([facility] )  Better than before  Malignant neoplasm of lower-outer quadrant of left breast of female, estrogen receptor negative ([facility] )  Followed up by the Hematology-Oncology  Age-related osteoporosis without current pathological fracture  -     ibandronate (BONIVA) 150 MG tablet; Take 1 tablet (150 mg total) by mouth every 30 (thirty) days    Vitamin D deficiency  Still taking still taking the vitamin D supplement  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[facility] )  PTH levels were slightly high  Stress incontinence  -     Ambulatory Referral to Urogynecology; Future    urinary incontinence is a very chronic problem with her I recommend her to see the urogynecologist as above         M*Modal software was used to dictate this note  It may contain errors with dictating incorrect words or incorrect spelling  Please contact the provider directly with any questions  Subjective:   Chief Complaint   Patient presents with    Follow-up     Vitamin D deficiency  last AWV 1/22        Patient ID: Judit Gimenez is a 67 y o  female  HPI  This is a very pleasant 67 years young lady who is here today for the regular follow-up she is doing well no new complaints except for the same problems with the urinary symptoms with frequency urgency and no incontinence some anxiety and depression but not much fatigue and tiredness    Review of system essentially unremarkable except for the given above  Discussed about the osteoporosis continue the vitamin-D and calcium and I will order Boniva her insurance company is not approving her for Prolia every 6 month they want any of the oral medication or Reclast before  The following portions of the patient's history were reviewed and updated as appropriate: allergies, current medications, past family history, past medical history, past social history, past surgical history and problem list     Review of Systems   Constitutional: Positive for fatigue  Negative for chills  HENT: Negative for congestion, ear pain, hearing loss, postnasal drip, sinus pressure, sore throat and voice change  Eyes: Negative for pain, discharge and visual disturbance  Respiratory: Negative for cough, chest tightness and shortness of breath  Cardiovascular: Negative for chest pain, palpitations and leg swelling  Gastrointestinal: Negative for abdominal pain, blood in stool, diarrhea, nausea and rectal pain  Genitourinary: Positive for frequency and urgency  Negative for difficulty urinating and dysuria  Urinary incontinence    Musculoskeletal: Negative for arthralgias and joint swelling  Skin: Negative for rash  Allergic/Immunologic: Negative for environmental allergies and food allergies  Neurological: Negative for dizziness, tremors, weakness, numbness and headaches  Hematological: Negative for adenopathy  Psychiatric/Behavioral: Positive for dysphoric mood  Negative for behavioral problems and hallucinations  The patient is nervous/anxious            Past Medical History:   Diagnosis Date    Back pain     Breast cancer ([facility] ) 2012    C  difficile diarrhea     [facility]A [facility]cere[facility] vascular accident) ([facility] )     GERD (gastroesophageal reflux disease)     High blood pressure     Hx of cervical spine surgery 2011    Hypertension     Spondylosis     TIA (transient ischemic attack) 01/01/2011    Urinary incontinence          Current Outpatient Medications:     amoxicillin (AMOXIL) 500 mg capsule, Prior to dental procedures, Disp: , Rfl:     ergocalciferol (VITAMIN D2) 50,000 units, Take 1 capsule (50,000 Units total) by mouth once a week, Disp: 12 capsule, Rfl: 0    escitalopram (Lexapro) 10 mg tablet, Take 1 tablet (10 mg total) by mouth daily Half a pill for 1 week then increase to the 1 pill after supper (Patient taking differently: Take 10 mg by mouth daily One pill daily ), Disp: 90 tablet, Rfl: 1    loperamide (IMODIUM) 2 mg capsule, Take 1 capsule (2 mg total) by mouth 4 (four) times a day as needed for diarrhea, Disp: 30 capsule, Rfl: 0    loperamide (IMODIUM) 2 mg capsule, Take 1 capsule (2 mg total) by mouth 4 (four) times a day as needed for diarrhea, Disp: 120 capsule, Rfl: 3    metoprolol succinate (TOPROL-XL) 25 mg 24 hr tablet, Take 25 mg by mouth daily, Disp: , Rfl:     oxybutynin (DITROPAN-XL) 5 mg 24 hr tablet, Take 1 tablet (5 mg total) by mouth 2 (two) times a day, Disp: 180 tablet, Rfl: 0    simvastatin (ZOCOR) 20 mg tablet, Take 20 mg by mouth daily, Disp: , Rfl:     No Known Allergies    Social History   Past Surgical History:   Procedure Laterality Date    BLADDER SUSPENSION      BREAST SURGERY      BREAST SURGERY Left 01/01/2012    CERVICAL SPINE SURGERY  05/01/2011    2 rods 13 screws    ILEOSTOMY  01/01/2013    REPLACEMENT TOTAL KNEE Left 01/01/2016    REPLACEMENT TOTAL KNEE BILATERAL      TOTAL KNEE ARTHROPLASTY Right 01/01/2013    TOTAL KNEE ARTHROPLASTY REVISION Right 01/01/2015     Family History   Problem Relation Age of Onset    Alzheimer's disease Mother     Alcohol abuse Brother        Objective:  /78 (BP Location: Left arm, Patient Position: Sitting, Cuff Size: Standard)   Pulse 64   Temp 98 °F (36 7 °C) (Temporal)   Ht 5' 4" (1 626 m)   Wt 54 7 kg (120 lb 11 2 oz)   SpO2 99%   BMI 20 72 kg/m²        Physical Exam  Constitutional:       Appearance: She is well-developed  HENT:      Right Ear: External ear normal    Eyes:      Conjunctiva/sclera: Conjunctivae normal       Pupils: Pupils are equal, round, and reactive to light  Neck:      Thyroid: No thyromegaly  Vascular: No JVD  Cardiovascular:      Rate and Rhythm: Normal rate and regular rhythm  Heart sounds: Normal heart sounds  Pulmonary:      Breath sounds: Normal breath sounds     Abdominal:      General: Bowel sounds are normal       Palpations: Abdomen is soft  Musculoskeletal:         General: Normal range of motion  Cervical back: Normal range of motion  Lymphadenopathy:      Cervical: No cervical adenopathy  Skin:     General: Skin is dry  Neurological:      Mental Status: She is alert and oriented to person, place, and time  Deep Tendon Reflexes: Reflexes are normal and symmetric     Psychiatric:         Behavior: Behavior normal

## 2022-04-13 NOTE — TELEPHONE ENCOUNTER
Insurance will not cover the Prolia unless she tries oral medications or Reclast first     Please advise since patient is having her appt today

## 2022-04-14 ENCOUNTER — OFFICE VISIT (OUTPATIENT)
Dept: PHYSICAL THERAPY | Facility: CLINIC | Age: 77
End: 2022-04-14
Payer: COMMERCIAL

## 2022-04-14 DIAGNOSIS — G89.29 CHRONIC GLUTEAL PAIN: Primary | ICD-10-CM

## 2022-04-14 DIAGNOSIS — M79.18 CHRONIC GLUTEAL PAIN: Primary | ICD-10-CM

## 2022-04-14 DIAGNOSIS — M81.0 AGE-RELATED OSTEOPOROSIS WITHOUT CURRENT PATHOLOGICAL FRACTURE: ICD-10-CM

## 2022-04-14 PROCEDURE — 97110 THERAPEUTIC EXERCISES: CPT

## 2022-04-14 PROCEDURE — 97112 NEUROMUSCULAR REEDUCATION: CPT

## 2022-04-14 NOTE — PROGRESS NOTES
Daily Note     Today's date: 2022  Patient name: Artie Burgos  :   MRN: [id number]  Referring provider: Jessi Hernandez DO  Dx:   Encounter Diagnosis     ICD-10-CM    1  Chronic gluteal pain  M79 18     G89 29    2  Age-related osteoporosis without current pathological fracture  M81 0        Start Time: 1400  Stop Time: 1445  Total time in clinic (min): 45 minutes    Subjective: Patient states she has been feeling better  She has been doing her exercises at home as instructed  Objective: See treatment diary below      Assessment: Continued with outlined program  Patient demonstrates improved tolerance to strengthening exercises  She has good carryover from previous sessions evident of compliance for HEP  She was able to progress with sit to stands with cues to avoid genu valgus which she exhibits increase muscle fatigue  Plan: Progress treatment as tolerated         Diagnosis: Osteoporosis, scarlet glute pain   Precautions: FWW for mobility, hx of CA   Primary Goals: 1) Generalized weakness BUE and BLE  2) Decreased ROM of lumbar spine  3) Tightness of glute mm   *asterisks by exercise = given for HEP   Manuals 3/17 3/23 4/7 4/14                                            There Ex        Bike/TM  5 min   5 min     UBE    5 min      piriformis S*   3x30 sec      Seated HS S*  3x30 sec       SKTC*   10x10 sec      Figure 4 S*  3x30 sec   3x30 sec                                     Neuro Re-Ed        Mini squats   2x10      3 way SLR*  2x10 ea  2x10 ea  2x10 ea     Mini lunges        Standing heel raises   2x10  2x10     tricep presses in chair (long armed chair)  2x10   Little leg assist   2x10 little leg assist     Bicep/tricep with bands   RTB 2x10 ea RTB 2x10 ea     TB row/ext   RTB 2x10 ea     DB forward and lateral raises        Lateral step ups        Clams         bridges   2x10     Leg Press   35# 2x10       Leg Press HR  35# 2x10                Re-evaluation              Ther Act sit to stands       2x10       step ups             Modalities

## 2022-04-21 ENCOUNTER — EVALUATION (OUTPATIENT)
Dept: PHYSICAL THERAPY | Facility: CLINIC | Age: 77
End: 2022-04-21
Payer: COMMERCIAL

## 2022-04-21 DIAGNOSIS — G89.29 CHRONIC GLUTEAL PAIN: Primary | ICD-10-CM

## 2022-04-21 DIAGNOSIS — M79.18 CHRONIC GLUTEAL PAIN: Primary | ICD-10-CM

## 2022-04-21 DIAGNOSIS — M81.0 AGE-RELATED OSTEOPOROSIS WITHOUT CURRENT PATHOLOGICAL FRACTURE: ICD-10-CM

## 2022-04-21 PROCEDURE — 97112 NEUROMUSCULAR REEDUCATION: CPT | Performed by: PHYSICAL THERAPIST

## 2022-04-21 PROCEDURE — 97110 THERAPEUTIC EXERCISES: CPT | Performed by: PHYSICAL THERAPIST

## 2022-04-21 NOTE — PROGRESS NOTES
PT Re-Evaluation     Haseeb Felton has been compliant with attending physical therapy and completing home exercise program since initial evaluation  Paramjitiss List  has made improvements in objective data since initial evalulation and has achieved all goals  Patient reports having returned to their prior level of function  Patient provided with updated Home Exercise Program, all questions answered, and verbalized understanding, agreeing to plan of care  Thus it was mutually decided to discontinue this episode of care and transition to Home Exercise Program     Today's date: 2022  Patient name: Haseeb Felton  :   MRN: [id number]  Referring provider: Carlitos Marshall DO  Dx:   Encounter Diagnosis     ICD-10-CM    1  Chronic gluteal pain  M79 18     G89 29    2  Age-related osteoporosis without current pathological fracture  M81 0        Start Time: 1400  Stop Time: 1440  Total time in clinic (min): 40 minutes    Assessment  Assessment details: Haseeb Felton is a pleasant 67 y o  female who presents with chronic L glute pain and osteopenia that would benefit from weightbearing exercise  She has made consistent progress towards her strength goals, and has improved her 30 sec STS by 1 rep indicating improved functional strength  She would benefit from one more visit for updated HEP and then transition to gym and HEP  Primary movement impairment diagnosis of generalized weakness resulting in pathoanatomical symptoms of Chronic gluteal pain  (primary encounter diagnosis)  Age-related osteoporosis without current pathological fracture and limiting her ability to exercise or recreation, lift, perform household chores, squat to  objects from the floor and stoop  Impairments include:  1) Generalized weakness BUE and BLE  2) Tightness of glute mm and hamstrings    Discussed risks, benefits, and alternatives to treatment, and answered all patient questions to patient satisfaction    Impairments: abnormal gait, abnormal muscle firing, abnormal muscle tone, abnormal or restricted ROM, abnormal movement, activity intolerance, impaired balance, impaired physical strength, lacks appropriate home exercise program, pain with function and poor posture   Understanding of Dx/Px/POC: good   Prognosis: fair    Goals  Impairment Goals 4-6 weeks  - Decrease pain to <3/10 - met  - Improve lumbar AROM to >50% throughout - met  - Increase global LE strength to 4/5 throughout - partially met  - Improve global UE strength to 4/5 throughout - partially met  - Increase core strength to be able to sit straight up without deviation - partially met    Functional Goals 6-8 weeks  - Return to Prior Level of Function - partially met  - Patient will be independent with HEP - partially met  - Patient will be able to perform sit to stand without increased pain/compensation/difficulty - met  - Patient will be able to bend forward without increased pain/compensation/difficulty - met  - Patient will be able to return to the gym confidently - met        Plan  Patient would benefit from: skilled physical therapy  Planned therapy interventions: abdominal trunk stabilization, behavior modification, body mechanics training, breathing training, flexibility, functional ROM exercises, home exercise program, joint mobilization, manual therapy, massage, Talavera taping, muscle pump exercises, neuromuscular re-education, patient education, postural training, strengthening, stretching, therapeutic activities, therapeutic exercise and therapeutic training  Duration in visits: 1  Treatment plan discussed with: patient        Subjective Evaluation    History of Present Illness  Mechanism of injury: PLOF: Uses FWW at baseline  HISTORY OF CURRENT INJURY:  Patient went back to PCP because of depression and anxiety, and he was not helpful  He referred her to a psychologist  She was not thrilled with this, so she changed PCPs and she was very happy with this   This provider sent her for a DEXA scan and changed her meds  She has osteopenia, and may start an injection for this  She was recommended to do weightbearing exercises to encourage bone density, and wants to go to the gym  She is here at PT for this, and wants to get the the gym as soon as possible  She has chronic glute and sciatic pain as well (years), and has not gotten an MRI for years  Her glute pain can be severe, and travels down both her legs to the knee  She takes tramadol for this and the sciatic pain  Update: Patient reports that she did a lot of sit to stands at PT and she was sore for 3 days  She reports she is feeling stronger overall  She knows she needs to get to the gym and do it more often  She wishes to transition to gym at end of April  PAIN LOCATION/DESCRIPTORS: Chidi glute pain to knee has improved, and sciatic nerve is also improved  AGGRAVATING FACTORS:    Improved: Bending forward, stooping, stretching L glute  EASES: tramadol, sitting, standing  DAY PATTERN: with bending only  IMAGING: None recently  SPECIAL QUESTIONS:  Hx of cancer  PATIENT GOALS: Patient wishes to get back to the gym to strengthen herself  - Patient rates herself at 100%  Patient wishes to get rid of her glute pain   - Patient rates herself at 75% improved  Pain  Current pain ratin  At best pain ratin  At worst pain ratin  Location: L glute into hamstring  Progression: improved    Patient Goals  Patient goals for therapy: decreased pain, increased motion, increased strength, independence with ADLs/IADLs and return to sport/leisure activities          Objective     Active Range of Motion     Lumbar   Flexion: 80 degrees   Extension: -15 degrees  Restriction level: maximal  Left lateral flexion:  with pain Restriction level: minimal  Right lateral flexion:  Restriction level: minimal  Left rotation:  Restriction level: minimal  Right rotation:  Restriction level: minimal    Strength/Myotome Testing     Left Shoulder     Planes of Motion   Flexion: 4   Abduction: 4   External rotation at 0°: 4     Right Shoulder     Planes of Motion   Flexion: 3+   Abduction: 4-   External rotation at 0°: 4     Left Elbow   Flexion: 4  Extension: 4-    Right Elbow   Flexion: 4  Extension: 4-    Left Hip   Planes of Motion   Flexion: 4-  Abduction: 3+  Adduction: 4-  External rotation: 4-  Internal rotation: 4-    Right Hip   Planes of Motion   Flexion: 4-  Abduction: 3+  Adduction: 4-  External rotation: 4-  Internal rotation: 4-    Left Knee   Flexion: 4  Extension: 4    Right Knee   Flexion: 4  Extension: 4    Tests     Lumbar     Left   Negative passive SLR and slump test      Right   Negative passive SLR and slump test      Left Pelvic Girdle/Sacrum   Negative: thigh thrust and active SLR test      Right Pelvic Girdle/Sacrum   Negative: thigh thrust and active SLR test      Left Hip   Negative HAVEN and FADIR  Right Hip   Negative HAVEN and FADIR       Additional Tests Details  No pain with passive hip flexion bilaterally at end ranges  Tightness in HS and piriformis bilaterally  No TTP of piriformis      General Comments:      Lumbar Comments  30 sec STS: 8 reps using handles  TU sec with FWW  Sit to stand: no pain, valgus collapse of knees which is able to be corrected by pt              Diagnosis: Osteoporosis, scarlet glute pain   Precautions: FWW for mobility, hx of CA   Primary Goals: 1) Generalized weakness BUE and BLE  2) Decreased ROM of lumbar spine  3) Tightness of glute mm   *asterisks by exercise = given for HEP   Manuals 3/17 3/23 4/7 4/14 4/21                                           There Ex        Bike/TM  5 min   5 min  5 min   UBE    5 min      piriformis S*   3x30 sec      Seated HS S*  3x30 sec       SKTC*   10x10 sec      Figure 4 S*  3x30 sec   3x30 sec                                     Neuro Re-Ed        Biodex     chava MIRZA, random control x 2 ea   Mini squats   2x10      3 way SLR*  2x10 ea  2x10 ea 2x10 ea     Mini lunges        Standing heel raises   2x10  2x10     tricep presses in chair (long armed chair)  2x10   Little leg assist   2x10 little leg assist     Bicep/tricep with bands   RTB 2x10 ea RTB 2x10 ea     TB row/ext   RTB 2x10 ea     DB forward and lateral raises        Lateral step ups        Clams         bridges   2x10     Leg Press   35# 2x10       Leg Press HR  35# 2x10                Re-evaluation          IM, PT    Ther Act              sit to stands       2x10       step ups             Modalities

## 2022-04-28 ENCOUNTER — PATIENT MESSAGE (OUTPATIENT)
Dept: INTERNAL MEDICINE CLINIC | Age: 77
End: 2022-04-28

## 2022-04-28 ENCOUNTER — APPOINTMENT (OUTPATIENT)
Dept: PHYSICAL THERAPY | Facility: CLINIC | Age: 77
End: 2022-04-28
Payer: COMMERCIAL

## 2022-07-19 DIAGNOSIS — F41.8 DEPRESSION WITH ANXIETY: Primary | ICD-10-CM

## 2022-07-25 ENCOUNTER — PATIENT MESSAGE (OUTPATIENT)
Dept: INTERNAL MEDICINE CLINIC | Age: 77
End: 2022-07-25

## 2022-07-25 DIAGNOSIS — F33.1 MODERATE EPISODE OF RECURRENT MAJOR DEPRESSIVE DISORDER (HCC): ICD-10-CM

## 2022-07-25 RX ORDER — ESCITALOPRAM OXALATE 10 MG/1
10 TABLET ORAL DAILY
Qty: 90 TABLET | Refills: 1 | Status: SHIPPED | OUTPATIENT
Start: 2022-07-25 | End: 2022-09-29 | Stop reason: ALTCHOICE

## 2022-08-02 ENCOUNTER — TELEPHONE (OUTPATIENT)
Dept: INTERNAL MEDICINE CLINIC | Age: 77
End: 2022-08-02

## 2022-08-02 DIAGNOSIS — F33.1 MODERATE EPISODE OF RECURRENT MAJOR DEPRESSIVE DISORDER (HCC): ICD-10-CM

## 2022-08-02 RX ORDER — DULOXETIN HYDROCHLORIDE 30 MG/1
30 CAPSULE, DELAYED RELEASE ORAL DAILY
Qty: 90 CAPSULE | Refills: 1 | Status: CANCELLED | OUTPATIENT
Start: 2022-08-02

## 2022-08-02 NOTE — TELEPHONE ENCOUNTER
Last ov 4/13/22  Next ov 8/17/22    Patient requesting refill of Duloxetine  Duloxetine was discontinued 3/2/22  She was told to continue Escitalopram 10 mg daily    Called patient and LVM to call back about medication she is currently taking

## 2022-08-05 RX ORDER — DULOXETIN HYDROCHLORIDE 30 MG/1
30 CAPSULE, DELAYED RELEASE ORAL DAILY
Qty: 90 CAPSULE | Refills: 0 | Status: SHIPPED | OUTPATIENT
Start: 2022-08-05 | End: 2022-09-29

## 2022-08-05 NOTE — TELEPHONE ENCOUNTER
Patient called back stating Dr Catalina Bowman approved taking Duloxetine 30 mg daily and Escitalopram on 7/26/22  She has an appointment with Chapin Das Therapist at [facility] next week      Last ov 4/13/22  Next ov 9/29/22

## 2022-09-11 ENCOUNTER — PATIENT MESSAGE (OUTPATIENT)
Dept: INTERNAL MEDICINE CLINIC | Age: 77
End: 2022-09-11

## 2022-09-12 ENCOUNTER — OFFICE VISIT (OUTPATIENT)
Dept: INTERNAL MEDICINE CLINIC | Age: 77
End: 2022-09-12
Payer: COMMERCIAL

## 2022-09-12 VITALS
HEIGHT: 64 IN | WEIGHT: 115 LBS | SYSTOLIC BLOOD PRESSURE: 140 MMHG | HEART RATE: 88 BPM | DIASTOLIC BLOOD PRESSURE: 64 MMHG | BODY MASS INDEX: 19.63 KG/M2 | OXYGEN SATURATION: 99 % | TEMPERATURE: 97.8 F

## 2022-09-12 DIAGNOSIS — E55.9 VITAMIN D DEFICIENCY: Primary | ICD-10-CM

## 2022-09-12 DIAGNOSIS — Z93.2 ILEOSTOMY STATUS (HCC): ICD-10-CM

## 2022-09-12 DIAGNOSIS — N30.00 ACUTE CYSTITIS WITHOUT HEMATURIA: ICD-10-CM

## 2022-09-12 LAB
SL AMB  POCT GLUCOSE, UA: 100
SL AMB LEUKOCYTE ESTERASE,UA: NORMAL
SL AMB POCT BILIRUBIN,UA: NORMAL
SL AMB POCT BLOOD,UA: NORMAL
SL AMB POCT CLARITY,UA: CLEAR
SL AMB POCT COLOR,UA: NORMAL
SL AMB POCT KETONES,UA: NORMAL
SL AMB POCT NITRITE,UA: POSITIVE
SL AMB POCT PH,UA: 5
SL AMB POCT SPECIFIC GRAVITY,UA: 1.02
SL AMB POCT URINE PROTEIN: 30
SL AMB POCT UROBILINOGEN: 1

## 2022-09-12 PROCEDURE — 3725F SCREEN DEPRESSION PERFORMED: CPT | Performed by: INTERNAL MEDICINE

## 2022-09-12 PROCEDURE — 81003 URINALYSIS AUTO W/O SCOPE: CPT | Performed by: INTERNAL MEDICINE

## 2022-09-12 PROCEDURE — 99213 OFFICE O/P EST LOW 20 MIN: CPT | Performed by: INTERNAL MEDICINE

## 2022-09-12 PROCEDURE — 1160F RVW MEDS BY RX/DR IN RCRD: CPT | Performed by: INTERNAL MEDICINE

## 2022-09-12 RX ORDER — BUSPIRONE HYDROCHLORIDE 5 MG/1
TABLET ORAL
COMMUNITY
Start: 2022-09-10

## 2022-09-12 RX ORDER — ESCITALOPRAM OXALATE 20 MG/1
20 TABLET ORAL EVERY MORNING
COMMUNITY
Start: 2022-09-06

## 2022-09-12 RX ORDER — CEPHALEXIN 500 MG/1
500 CAPSULE ORAL EVERY 8 HOURS SCHEDULED
Qty: 15 CAPSULE | Refills: 0 | Status: SHIPPED | OUTPATIENT
Start: 2022-09-12 | End: 2022-09-17

## 2022-09-12 NOTE — ASSESSMENT & PLAN NOTE
Urine dip in office is positive for nitrate  Will treat with cephalexin 500 mg p o  T i d  For 5 days  Also advised for adequate hydration

## 2022-09-12 NOTE — PROGRESS NOTES
Assessment/Plan:    Acute cystitis without hematuria  Urine dip in office is positive for nitrate  Will treat with cephalexin 500 mg p o  T i d  For 5 days  Also advised for adequate hydration  Vitamin D deficiency  Continue with vitamin D3 supplementation  Diagnoses and all orders for this visit:    Vitamin D deficiency    Acute cystitis without hematuria  -     POCT urine dip  -     cephalexin (KEFLEX) 500 mg capsule; Take 1 capsule (500 mg total) by mouth every 8 (eight) hours for 5 days    Ileostomy status ([facility])    Other orders  -     busPIRone (BUSPAR) 5 mg tablet; TAKE ONE HALF A TABLET BY MOUTH TWICE DAILY  -     escitalopram (LEXAPRO) 20 mg tablet; Take 20 mg by mouth every morning               Subjective:          Patient ID: Avril Ramon is a 65 y o  female  Patient complaining of frequency dysuria started yesterday  No fever chills      The following portions of the patient's history were reviewed and updated as appropriate: allergies, current medications, past family history, past medical history, past social history, past surgical history and problem list     Review of Systems   Constitutional: Negative for fatigue and fever  HENT: Negative for congestion, ear discharge, ear pain, postnasal drip, sinus pressure, sore throat, tinnitus and trouble swallowing  Eyes: Negative for discharge, itching and visual disturbance  Respiratory: Negative for cough and shortness of breath  Cardiovascular: Negative for chest pain and palpitations  Gastrointestinal: Negative for abdominal pain, diarrhea, nausea and vomiting  Endocrine: Negative for cold intolerance and polyuria  Genitourinary: Positive for dysuria and urgency  Negative for difficulty urinating  Musculoskeletal: Negative for arthralgias and neck pain  Skin: Negative for rash  Allergic/Immunologic: Negative for environmental allergies  Neurological: Negative for dizziness, weakness and headaches  Psychiatric/Behavioral: The patient is not nervous/anxious            Past Medical History:   Diagnosis Date    Back pain     Breast cancer ([facility] [address] [address] ) 2012    C  difficile diarrhea     [facility]     GERD (gastroesophageal reflux disease)     High blood pressure     Hx of cervical spine surgery 2011    Hypertension     Spondylosis     TIA (transient ischemic attack) 01/01/2011    Urinary incontinence          Current Outpatient Medications:     amoxicillin (AMOXIL) 500 mg capsule, Prior to dental procedures, Disp: , Rfl:     busPIRone (BUSPAR) 5 mg tablet, TAKE ONE HALF A TABLET BY MOUTH TWICE DAILY, Disp: , Rfl:     cephalexin (KEFLEX) 500 mg capsule, Take 1 capsule (500 mg total) by mouth every 8 (eight) hours for 5 days, Disp: 15 capsule, Rfl: 0    DULoxetine (CYMBALTA) 30 mg delayed release capsule, Take 1 capsule (30 mg total) by mouth daily, Disp: 90 capsule, Rfl: 0    escitalopram (Lexapro) 10 mg tablet, Take 1 tablet (10 mg total) by mouth daily, Disp: 90 tablet, Rfl: 1    escitalopram (LEXAPRO) 20 mg tablet, Take 20 mg by mouth every morning, Disp: , Rfl:     ibandronate (BONIVA) 150 MG tablet, Take 1 tablet (150 mg total) by mouth every 30 (thirty) days, Disp: 3 tablet, Rfl: 3    loperamide (IMODIUM) 2 mg capsule, Take 1 capsule (2 mg total) by mouth 4 (four) times a day as needed for diarrhea, Disp: 30 capsule, Rfl: 0    loperamide (IMODIUM) 2 mg capsule, Take 1 capsule (2 mg total) by mouth 4 (four) times a day as needed for diarrhea, Disp: 120 capsule, Rfl: 3    metoprolol succinate (TOPROL-XL) 25 mg 24 hr tablet, Take 25 mg by mouth daily, Disp: , Rfl:     oxybutynin (DITROPAN-XL) 5 mg 24 hr tablet, Take 1 tablet (5 mg total) by mouth 2 (two) times a day, Disp: 180 tablet, Rfl: 0    simvastatin (ZOCOR) 20 mg tablet, Take 20 mg by mouth daily, Disp: , Rfl:     ergocalciferol (VITAMIN D2) 50,000 units, Take 1 capsule (50,000 Units total) by mouth once a week (Patient not taking: No sig reported), Disp: 12 capsule, Rfl: 0    No Known Allergies    Social History   Past Surgical History:   Procedure Laterality Date    BLADDER SUSPENSION      BREAST SURGERY      BREAST SURGERY Left 01/01/2012    CERVICAL SPINE SURGERY  05/01/2011    2 rods 13 screws    ILEOSTOMY  01/01/2013    REPLACEMENT TOTAL KNEE Left 01/01/2016    REPLACEMENT TOTAL KNEE BILATERAL      TOTAL KNEE ARTHROPLASTY Right 01/01/2013    TOTAL KNEE ARTHROPLASTY REVISION Right 01/01/2015     Family History   Problem Relation Age of Onset    Alzheimer's disease Mother     Alcohol abuse Brother        Objective:  /64 (BP Location: Right arm, Patient Position: Sitting, Cuff Size: Standard)   Pulse 88   Temp 97 8 °F (36 6 °C) (Temporal)   Ht 5' 4" (1 626 m)   Wt 52 2 kg (115 lb)   SpO2 99% Comment: room air  BMI 19 74 kg/m²   Body mass index is 19 74 kg/m²  Physical Exam  Constitutional:       Appearance: She is well-developed  HENT:      Head: Normocephalic  Right Ear: External ear normal       Left Ear: External ear normal       Nose: No rhinorrhea  Eyes:      General: No scleral icterus  Pupils: Pupils are equal, round, and reactive to light  Neck:      Thyroid: No thyromegaly  Trachea: No tracheal deviation  Cardiovascular:      Rate and Rhythm: Normal rate and regular rhythm  Heart sounds: Normal heart sounds  Pulmonary:      Effort: Pulmonary effort is normal  No respiratory distress  Breath sounds: Normal breath sounds  Chest:      Chest wall: No tenderness  Abdominal:      General: Bowel sounds are normal       Palpations: Abdomen is soft  There is no mass  Tenderness: There is no abdominal tenderness  Comments: Ileostomy noted   Musculoskeletal:         General: Normal range of motion  Cervical back: Normal range of motion and neck supple  Right lower leg: No edema  Left lower leg: No edema     Lymphadenopathy: Cervical: No cervical adenopathy  Skin:     General: Skin is warm  Neurological:      Mental Status: She is alert and oriented to person, place, and time  Cranial Nerves: No cranial nerve deficit     Psychiatric:         Mood and Affect: Mood normal          Behavior: Behavior normal

## 2022-09-22 ENCOUNTER — RA CDI HCC (OUTPATIENT)
Dept: OTHER | Facility: HOSPITAL | Age: 77
End: 2022-09-22

## 2022-09-22 NOTE — PROGRESS NOTES
[facility]  coding opportunities       Chart reviewed, no opportunity found:   [address]        Patients Insurance     Medicare Insurance: [facility]

## 2022-09-29 ENCOUNTER — OFFICE VISIT (OUTPATIENT)
Dept: INTERNAL MEDICINE CLINIC | Age: 77
End: 2022-09-29
Payer: COMMERCIAL

## 2022-09-29 VITALS
TEMPERATURE: 98.6 F | HEIGHT: 64 IN | SYSTOLIC BLOOD PRESSURE: 122 MMHG | BODY MASS INDEX: 20.14 KG/M2 | WEIGHT: 118 LBS | OXYGEN SATURATION: 99 % | DIASTOLIC BLOOD PRESSURE: 82 MMHG | HEART RATE: 76 BPM

## 2022-09-29 DIAGNOSIS — G62.9 PERIPHERAL POLYNEUROPATHY: ICD-10-CM

## 2022-09-29 DIAGNOSIS — M81.0 AGE-RELATED OSTEOPOROSIS WITHOUT CURRENT PATHOLOGICAL FRACTURE: ICD-10-CM

## 2022-09-29 DIAGNOSIS — I10 PRIMARY HYPERTENSION: ICD-10-CM

## 2022-09-29 DIAGNOSIS — E44.1 MILD PROTEIN-CALORIE MALNUTRITION (HCC): Primary | ICD-10-CM

## 2022-09-29 DIAGNOSIS — Z85.3 HISTORY OF BREAST CANCER: ICD-10-CM

## 2022-09-29 DIAGNOSIS — Z00.00 MEDICARE ANNUAL WELLNESS VISIT, SUBSEQUENT: ICD-10-CM

## 2022-09-29 PROCEDURE — G0439 PPPS, SUBSEQ VISIT: HCPCS | Performed by: INTERNAL MEDICINE

## 2022-09-29 PROCEDURE — 99214 OFFICE O/P EST MOD 30 MIN: CPT | Performed by: INTERNAL MEDICINE

## 2022-09-29 RX ORDER — GABAPENTIN 100 MG/1
100 CAPSULE ORAL 3 TIMES DAILY
Qty: 90 CAPSULE | Refills: 3 | Status: SHIPPED | OUTPATIENT
Start: 2022-09-29

## 2022-09-29 NOTE — PROGRESS NOTES
Assessment and Plan:     Problem List Items Addressed This Visit    None          Preventive health issues were discussed with patient, and age appropriate screening tests were ordered as noted in patient's After Visit Summary  Personalized health advice and appropriate referrals for health education or preventive services given if needed, as noted in patient's After Visit Summary  History of Present Illness:     Patient presents for a Medicare Wellness Visit    This is a very pleasant 65 years young lady who is here today for the regular follow-up she is complaining of depression and anxiety for that she is followed up by [facility] she is on Lexapro 20 and also on BuSpar I will suggest her to increase the dose of the BuSpar slowly under the supervision psychiatrist   Peripheral neuropathy it is a chronic problem for which she off and on when to several different physicians no improvement mostly the neuropathy is affecting below-the-knee she is not diabetic I will check her B12 and folate level which I am pretty sure done before but we will repeat and see where we are also I discussed with her regarding the use of Neurontin I will use it 100 mg once a day for 1 week 200 mg for 1 week and then increase to 3 times a day 100 mg and see how she does with this medication  Patient says that she did use Neurontin or gabapentin before several years ago will see how she does with this new approach and if it works and getting the blood workup I will see her back in 3 months unless any problem     Patient Care Team:  Deedee Guajardo MD as PCP - General (Internal Medicine)     Review of Systems:     Review of Systems   Constitutional: Positive for fatigue  Negative for chills  HENT: Negative for congestion, ear pain, hearing loss, postnasal drip, sinus pressure, sore throat and voice change  Eyes: Negative for pain, discharge and visual disturbance     Respiratory: Negative for cough, chest tightness and shortness of breath  Cardiovascular: Negative for chest pain, palpitations and leg swelling  Gastrointestinal: Negative for abdominal pain, blood in stool, diarrhea, nausea and rectal pain  Genitourinary: Negative for difficulty urinating, dysuria and urgency  Musculoskeletal: Positive for back pain  Negative for arthralgias and joint swelling  Skin: Negative for rash  Allergic/Immunologic: Negative for environmental allergies and food allergies  Neurological: Negative for dizziness, tremors, weakness, numbness and headaches  Hematological: Negative for adenopathy  Psychiatric/Behavioral: Positive for decreased concentration and dysphoric mood  Negative for behavioral problems and hallucinations  The patient is nervous/anxious           Problem List:     Patient Active Problem List   Diagnosis    Laceration with foreign body of right hand, initial encounter    Unspecified abnormalities of gait and mobility    Peripheral polyneuropathy    Hx of fusion of cervical spine    Ileostomy status ([facility]    Mild protein-calorie malnutrition ([facility]    Malignant neoplasm of lower-outer quadrant of left breast of female, estrogen receptor negative ([facility] )    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[facility] )    Vitamin D deficiency    Age-related osteoporosis without current pathological fracture    Acute cystitis without hematuria      Past Medical and Surgical History:     Past Medical History:   Diagnosis Date    Back pain     Breast cancer ([facility] ) 2012    C  difficile diarrhea     CVA (cerebral vascular accident) ([facility]     GERD (gastroesophageal reflux disease)     High blood pressure     Hx of cervical spine surgery 2011    Hypertension     Spondylosis     TIA (transient ischemic attack) 01/01/2011    Urinary incontinence      Past Surgical History:   Procedure Laterality Date    BLADDER SUSPENSION      BREAST SURGERY      BREAST SURGERY Left 01/01/2012    CERVICAL SPINE SURGERY  05/01/2011    2 rods 13 screws    ILEOSTOMY  2013    REPLACEMENT TOTAL KNEE Left 2016    REPLACEMENT TOTAL KNEE BILATERAL      TOTAL KNEE ARTHROPLASTY Right 2013    TOTAL KNEE ARTHROPLASTY REVISION Right 2015      Family History:     Family History   Problem Relation Age of Onset    Alzheimer's disease Mother     Alcohol abuse Brother       Social History:     Social History     Socioeconomic History    Marital status:      Spouse name: None    Number of children: None    Years of education: None    Highest education level: None   Occupational History    None   Tobacco Use    Smoking status: Former Smoker     Packs/day: 0 75     Years: 12 00     Pack years: 9 00     Types: Cigarettes    Smokeless tobacco: Never Used    Tobacco comment: smoked age [age], 3/4 ppd   Vaping Use    Vaping Use: Never used   Substance and Sexual Activity    Alcohol use:  Yes     Alcohol/week: 2 0 standard drinks     Types: 2 Standard drinks or equivalent per week    Drug use: Not Currently    Sexual activity: Not Currently   Other Topics Concern    None   Social History Narrative    · Most recent tobacco use screenin2019      · Do you currently or have you served in the [employer]:   No      Social Determinants of Health     Financial Resource Strain: Not on file   Food Insecurity: Not on file   Transportation Needs: Not on file   Physical Activity: Not on file   Stress: Not on file   Social Connections: Not on file   Intimate Partner Violence: Not on file   Housing Stability: Not on file      Medications and Allergies:     Current Outpatient Medications   Medication Sig Dispense Refill    amoxicillin (AMOXIL) 500 mg capsule Prior to dental procedures      busPIRone (BUSPAR) 5 mg tablet TAKE ONE HALF A TABLET BY MOUTH TWICE DAILY      escitalopram (Lexapro) 10 mg tablet Take 1 tablet (10 mg total) by mouth daily 90 tablet 1    escitalopram (LEXAPRO) 20 mg tablet Take 20 mg by mouth every morning      ibandronate (BONIVA) 150 MG tablet Take 1 tablet (150 mg total) by mouth every 30 (thirty) days 3 tablet 3    loperamide (IMODIUM) 2 mg capsule Take 1 capsule (2 mg total) by mouth 4 (four) times a day as needed for diarrhea 120 capsule 3    metoprolol succinate (TOPROL-XL) 25 mg 24 hr tablet Take 25 mg by mouth daily      oxybutynin (DITROPAN-XL) 5 mg 24 hr tablet Take 1 tablet (5 mg total) by mouth 2 (two) times a day 180 tablet 0    simvastatin (ZOCOR) 20 mg tablet Take 20 mg by mouth daily      DULoxetine (CYMBALTA) 30 mg delayed release capsule Take 1 capsule (30 mg total) by mouth daily (Patient not taking: Reported on 9/29/2022) 90 capsule 0    ergocalciferol (VITAMIN D2) 50,000 units Take 1 capsule (50,000 Units total) by mouth once a week (Patient not taking: No sig reported) 12 capsule 0    loperamide (IMODIUM) 2 mg capsule Take 1 capsule (2 mg total) by mouth 4 (four) times a day as needed for diarrhea 30 capsule 0     No current facility-administered medications for this visit  No Known Allergies   Immunizations:     Immunization History   Administered Date(s) Administered    COVID-19 PFIZER VACCINE 0 3 ML IM 02/14/2021, 03/07/2021, 11/15/2021    COVID-19 Pfizer vac (Vamsi-sucrose, gray cap) 12 yr+ IM 06/15/2022    INFLUENZA 09/28/2015, 11/03/2016, 11/06/2017, 11/25/2017, 09/13/2018, 09/27/2019, 09/21/2020, 11/29/2021    Influenza, Seasonal Vaccine, Quadrivalent, Adjuvanted,  5e 09/21/2020, 11/29/2021    Pneumococcal Conjugate 13-Valent 11/06/2017    Pneumococcal Polysaccharide PPV23 08/07/2016    Tdap 11/01/2019    Tetanus, adsorbed 07/27/1998      Health Maintenance:         Topic Date Due    Hepatitis C Screening  Never done         Topic Date Due    Influenza Vaccine (1) 09/01/2022      Medicare Screening Tests and Risk Assessments:     Myra Sacks is here for her Subsequent Wellness visit   Last Medicare Wellness visit information reviewed, patient interviewed and updates made to the record today  Health Risk Assessment:   Patient rates overall health as good  Patient feels that their physical health rating is slightly worse  Patient is dissatisfied with their life  Eyesight was rated as slightly worse  Hearing was rated as same  Patient feels that their emotional and mental health rating is slightly worse  Patients states they are never, rarely angry  Patient states they are often unusually tired/fatigued  Pain experienced in the last 7 days has been some  Patient's pain rating has been 3/10  Patient states that she has experienced no weight loss or gain in last 6 months  Depression Screening:   PHQ-9 Score: 12      Fall Risk Screening: In the past year, patient has experienced: history of falling in past year    Number of falls: 1  Injured during fall?: No    Feels unsteady when standing or walking?: Yes    Worried about falling?: Yes      Urinary Incontinence Screening:   Patient has leaked urine accidently in the last six months  Home Safety:  Patient has trouble with stairs inside or outside of their home  Patient has working smoke alarms and has working carbon monoxide detector  Home safety hazards include: none  Nutrition:   Current diet is Regular  Medications:   Patient is currently taking over-the-counter supplements  OTC medications include: see medication list  Patient is able to manage medications  Activities of Daily Living (ADLs)/Instrumental Activities of Daily Living (IADLs):   Walk and transfer into and out of bed and chair?: Yes  Dress and groom yourself?: Yes    Bathe or shower yourself?: Yes    Feed yourself? Yes  Do your laundry/housekeeping?: Yes  Manage your money, pay your bills and track your expenses?: Yes  Make your own meals?: Yes    Do your own shopping?: Yes    Previous Hospitalizations:   Any hospitalizations or ED visits within the last 12 months?: No      Advance Care Planning:   Living will: Yes    Advanced directive:  Yes Cognitive Screening:   Provider or family/friend/caregiver concerned regarding cognition?: No    PREVENTIVE SCREENINGS      Cardiovascular Screening:    General: Screening Current      Diabetes Screening:     General: Screening Not Indicated      Colorectal Cancer Screening:     General: Screening Not Indicated      Breast Cancer Screening:     General: History Breast Cancer      Cervical Cancer Screening:    General: Screening Not Indicated      Osteoporosis Screening:    General: Screening Not Indicated and History Osteoporosis      Abdominal Aortic Aneurysm (AAA) Screening:        General: Screening Current      Lung Cancer Screening:     General: Screening Not Indicated      Hepatitis C Screening:      Hep C Screening Accepted: Yes        Preventive Screening Comments: Ileostomy in place no need for colonoscopy    Screening, Brief Intervention, and Referral to Treatment (SBIRT)    Screening  Typical number of drinks in a day: 2  Typical number of drinks in a week: 0  Interpretation: Low risk drinking behavior  Single Item Drug Screening:  How often have you used an illegal drug (including marijuana) or a prescription medication for non-medical reasons in the past year? never    Single Item Drug Screen Score: 0  Interpretation: Negative screen for possible drug use disorder    Other Counseling Topics:   Calcium and vitamin D intake and regular weightbearing exercise  No exam data present     Physical Exam:     There were no vitals taken for this visit  Physical Exam  Vitals and nursing note reviewed  Constitutional:       General: She is not in acute distress  Appearance: She is well-developed  HENT:      Head: Normocephalic and atraumatic  Eyes:      Conjunctiva/sclera: Conjunctivae normal    Cardiovascular:      Rate and Rhythm: Normal rate and regular rhythm  Heart sounds: No murmur heard  Pulmonary:      Effort: Pulmonary effort is normal  No respiratory distress        Breath sounds: Normal breath sounds  Abdominal:      Palpations: Abdomen is soft  Tenderness: There is no abdominal tenderness  Musculoskeletal:      Cervical back: Neck supple  Skin:     General: Skin is warm and dry  Neurological:      Mental Status: She is alert     Psychiatric:         Mood and Affect: Mood normal          Behavior: Behavior normal           Abel Kelley MD

## 2022-09-29 NOTE — PATIENT INSTRUCTIONS
Medicare Preventive Visit Patient Instructions  Thank you for completing your Welcome to Medicare Visit or Medicare Annual Wellness Visit today  Your next wellness visit will be due in one year (9/30/2023)  The screening/preventive services that you may require over the next 5-10 years are detailed below  Some tests may not apply to you based off risk factors and/or age  Screening tests ordered at today's visit but not completed yet may show as past due  Also, please note that scanned in results may not display below  Preventive Screenings:  Service Recommendations Previous Testing/Comments   Colorectal Cancer Screening  * Colonoscopy    * Fecal Occult Blood Test (FOBT)/Fecal Immunochemical Test (FIT)  * Fecal DNA/Cologuard Test  * Flexible Sigmoidoscopy Age: 39-70 years old   Colonoscopy: every 10 years (may be performed more frequently if at higher risk)  OR  FOBT/FIT: every 1 year  OR  Cologuard: every 3 years  OR  Sigmoidoscopy: every 5 years  Screening may be recommended earlier than age 39 if at higher risk for colorectal cancer  Also, an individualized decision between you and your healthcare provider will decide whether screening between the ages of 74-80 would be appropriate  Colonoscopy: Not on file  FOBT/FIT: Not on file  Cologuard: Not on file  Sigmoidoscopy: Not on file          Breast Cancer Screening Age: 36 years old  Frequency: every 1-2 years  Not required if history of left and right mastectomy Mammogram: 10/17/2018    History Breast Cancer   Cervical Cancer Screening Between the ages of 21-29, pap smear recommended once every 3 years  Between the ages of 33-67, can perform pap smear with HPV co-testing every 5 years     Recommendations may differ for women with a history of total hysterectomy, cervical cancer, or abnormal pap smears in past  Pap Smear: Not on file    Screening Not Indicated   Hepatitis C Screening Once for adults born between 1945 and 1965  More frequently in patients at high risk for Hepatitis C Hep C Antibody: Not on file        Diabetes Screening 1-2 times per year if you're at risk for diabetes or have pre-diabetes Fasting glucose: No results in last 5 years (No results in last 5 years)  A1C: 5 5 % (4/8/2019)      Cholesterol Screening Once every 5 years if you don't have a lipid disorder  May order more often based on risk factors  Lipid panel: 01/25/2022    Screening Current     Other Preventive Screenings Covered by Medicare:  1  Abdominal Aortic Aneurysm (AAA) Screening: covered once if your at risk  You're considered to be at risk if you have a family history of AAA  2  Lung Cancer Screening: covers low dose CT scan once per year if you meet all of the following conditions: (1) Age 50-69; (2) No signs or symptoms of lung cancer; (3) Current smoker or have quit smoking within the last 15 years; (4) You have a tobacco smoking history of at least 20 pack years (packs per day multiplied by number of years you smoked); (5) You get a written order from a healthcare provider  3  Glaucoma Screening: covered annually if you're considered high risk: (1) You have diabetes OR (2) Family history of glaucoma OR (3)  aged 48 and older OR (3)  American aged 72 and older  3  Osteoporosis Screening: covered every 2 years if you meet one of the following conditions: (1) You're estrogen deficient and at risk for osteoporosis based off medical history and other findings; (2) Have a vertebral abnormality; (3) On glucocorticoid therapy for more than 3 months; (4) Have primary hyperparathyroidism; (5) On osteoporosis medications and need to assess response to drug therapy  · Last bone density test (DXA Scan): 02/07/2022   5  HIV Screening: covered annually if you're between the age of 15-65  Also covered annually if you are younger than 13 and older than 72 with risk factors for HIV infection   For pregnant patients, it is covered up to 3 times per pregnancy  Immunizations:  Immunization Recommendations   Influenza Vaccine Annual influenza vaccination during flu season is recommended for all persons aged >= 6 months who do not have contraindications   Pneumococcal Vaccine   * Pneumococcal conjugate vaccine = PCV13 (Prevnar 13), PCV15 (Vaxneuvance), PCV20 (Prevnar 20)  * Pneumococcal polysaccharide vaccine = PPSV23 (Pneumovax) Adults 25-60 years old: 1-3 doses may be recommended based on certain risk factors  Adults 72 years old: 1-2 doses may be recommended based off what pneumonia vaccine you previously received   Hepatitis B Vaccine 3 dose series if at intermediate or high risk (ex: diabetes, end stage renal disease, liver disease)   Tetanus (Td) Vaccine - COST NOT COVERED BY MEDICARE PART B Following completion of primary series, a booster dose should be given every 10 years to maintain immunity against tetanus  Td may also be given as tetanus wound prophylaxis  Tdap Vaccine - COST NOT COVERED BY MEDICARE PART B Recommended at least once for all adults  For pregnant patients, recommended with each pregnancy  Shingles Vaccine (Shingrix) - COST NOT COVERED BY MEDICARE PART B  2 shot series recommended in those aged 48 and above     Health Maintenance Due:      Topic Date Due    Hepatitis C Screening  Never done     Immunizations Due:      Topic Date Due    Influenza Vaccine (1) 09/01/2022     Advance Directives   What are advance directives? Advance directives are legal documents that state your wishes and plans for medical care  These plans are made ahead of time in case you lose your ability to make decisions for yourself  Advance directives can apply to any medical decision, such as the treatments you want, and if you want to donate organs  What are the types of advance directives? There are many types of advance directives, and each state has rules about how to use them   You may choose a combination of any of the following:  · Living will: This is a written record of the treatment you want  You can also choose which treatments you do not want, which to limit, and which to stop at a certain time  This includes surgery, medicine, IV fluid, and tube feedings  · Durable power of  for healthcare [facility]): This is a written record that states who you want to make healthcare choices for you when you are unable to make them for yourself  This person, called a proxy, is usually a family member or a friend  You may choose more than 1 proxy  · Do not resuscitate (DNR) order:  A DNR order is used in case your heart stops beating or you stop breathing  It is a request not to have certain forms of treatment, such as CPR  A DNR order may be included in other types of advance directives  · Medical directive: This covers the care that you want if you are in a coma, near death, or unable to make decisions for yourself  You can list the treatments you want for each condition  Treatment may include pain medicine, surgery, blood transfusions, dialysis, IV or tube feedings, and a ventilator (breathing machine)  · Values history: This document has questions about your views, beliefs, and how you feel and think about life  This information can help others choose the care that you would choose  Why are advance directives important? An advance directive helps you control your care  Although spoken wishes may be used, it is better to have your wishes written down  Spoken wishes can be misunderstood, or not followed  Treatments may be given even if you do not want them  An advance directive may make it easier for your family to make difficult choices about your care  Fall Prevention    Fall prevention  includes ways to make your home and other areas safer  It also includes ways you can move more carefully to prevent a fall  Health conditions that cause changes in your blood pressure, vision, or muscle strength and coordination may increase your risk for falls  Medicines may also increase your risk for falls if they make you dizzy, weak, or sleepy  Fall prevention tips:   · Stand or sit up slowly  · Use assistive devices as directed  · Wear shoes that fit well and have soles that   · Wear a personal alarm  · Stay active  · Manage your medical conditions  Home Safety Tips:  · Add items to prevent falls in the bathroom  · Keep paths clear  · Install bright lights in your home  · Keep items you use often on shelves within reach  · Paint or place reflective tape on the edges of your stairs  Urinary Incontinence   Urinary incontinence (UI)  is when you lose control of your bladder  UI develops because your bladder cannot store or empty urine properly  The 3 most common types of UI are stress incontinence, urge incontinence, or both  Medicines:   · May be given to help strengthen your bladder control  Report any side effects of medication to your healthcare provider  Do pelvic muscle exercises often:  Your pelvic muscles help you stop urinating  Squeeze these muscles tight for 5 seconds, then relax for 5 seconds  Gradually work up to squeezing for 10 seconds  Do 3 sets of 15 repetitions a day, or as directed  This will help strengthen your pelvic muscles and improve bladder control  Train your bladder:  Go to the bathroom at set times, such as every 2 hours, even if you do not feel the urge to go  You can also try to hold your urine when you feel the urge to go  For example, hold your urine for 5 minutes when you feel the urge to go  As that becomes easier, hold your urine for 10 minutes  Self-care:   · Keep a UI record  Write down how often you leak urine and how much you leak  Make a note of what you were doing when you leaked urine  · Drink liquids as directed  You may need to limit the amount of liquid you drink to help control your urine leakage  Do not drink any liquid right before you go to bed   Limit or do not have drinks that contain caffeine or alcohol  · Prevent constipation  Eat a variety of high-fiber foods  Good examples are high-fiber cereals, beans, vegetables, and whole-grain breads  Walking is the best way to trigger your intestines to have a bowel movement  · Exercise regularly and maintain a healthy weight  Weight loss and exercise will decrease pressure on your bladder and help you control your leakage  · Use a catheter as directed  to help empty your bladder  A catheter is a tiny, plastic tube that is put into your bladder to drain your urine  · Go to behavior therapy as directed  Behavior therapy may be used to help you learn to control your urge to urinate  © Copyright [employer] 2018 Information is for End User's use only and may not be sold, redistributed or otherwise used for commercial purposes   All illustrations and images included in CareNotes® are the copyrighted property of A D A M , Inc  or [address] [place] [address]

## 2022-10-12 ENCOUNTER — ESTABLISHED COMPREHENSIVE EXAM (OUTPATIENT)
Dept: URBAN - METROPOLITAN AREA CLINIC 6 | Facility: CLINIC | Age: 77
End: 2022-10-12

## 2022-10-12 DIAGNOSIS — H40.023: ICD-10-CM

## 2022-10-12 DIAGNOSIS — Z96.1: ICD-10-CM

## 2022-10-12 DIAGNOSIS — H26.491: ICD-10-CM

## 2022-10-12 PROCEDURE — 92014 COMPRE OPH EXAM EST PT 1/>: CPT

## 2022-10-12 PROCEDURE — 92133 CPTRZD OPH DX IMG PST SGM ON: CPT

## 2022-10-12 ASSESSMENT — TONOMETRY
OS_IOP_MMHG: 15
OD_IOP_MMHG: 17

## 2022-10-12 ASSESSMENT — VISUAL ACUITY
OS_CC: 20/25
OD_CC: 20/40

## 2022-10-17 DIAGNOSIS — M81.0 AGE-RELATED OSTEOPOROSIS WITHOUT CURRENT PATHOLOGICAL FRACTURE: ICD-10-CM

## 2022-10-17 RX ORDER — IBANDRONATE SODIUM 150 MG/1
150 TABLET, FILM COATED ORAL
Qty: 3 TABLET | Refills: 1 | Status: SHIPPED | OUTPATIENT
Start: 2022-10-17

## 2022-11-07 ENCOUNTER — APPOINTMENT (OUTPATIENT)
Dept: LAB | Facility: CLINIC | Age: 77
End: 2022-11-07

## 2022-11-07 DIAGNOSIS — E44.1 MILD PROTEIN-CALORIE MALNUTRITION (HCC): ICD-10-CM

## 2022-11-07 DIAGNOSIS — M81.0 AGE-RELATED OSTEOPOROSIS WITHOUT CURRENT PATHOLOGICAL FRACTURE: ICD-10-CM

## 2022-11-07 DIAGNOSIS — I10 PRIMARY HYPERTENSION: ICD-10-CM

## 2022-11-07 DIAGNOSIS — G62.9 PERIPHERAL POLYNEUROPATHY: ICD-10-CM

## 2022-11-07 LAB
ALBUMIN SERPL BCP-MCNC: 3.8 G/DL (ref 3.5–5)
ALP SERPL-CCNC: 44 U/L (ref 46–116)
ALT SERPL W P-5'-P-CCNC: 24 U/L (ref 12–78)
ANION GAP SERPL CALCULATED.3IONS-SCNC: 6 MMOL/L (ref 4–13)
AST SERPL W P-5'-P-CCNC: 22 U/L (ref 5–45)
BASOPHILS # BLD AUTO: 0.04 THOUSANDS/ÂΜL (ref 0–0.1)
BASOPHILS NFR BLD AUTO: 1 % (ref 0–1)
BILIRUB SERPL-MCNC: 0.63 MG/DL (ref 0.2–1)
BUN SERPL-MCNC: 26 MG/DL (ref 5–25)
CALCIUM SERPL-MCNC: 9.8 MG/DL (ref 8.3–10.1)
CHLORIDE SERPL-SCNC: 109 MMOL/L (ref 96–108)
CHOLEST SERPL-MCNC: 202 MG/DL
CO2 SERPL-SCNC: 23 MMOL/L (ref 21–32)
CREAT SERPL-MCNC: 0.96 MG/DL (ref 0.6–1.3)
EOSINOPHIL # BLD AUTO: 0.21 THOUSAND/ÂΜL (ref 0–0.61)
EOSINOPHIL NFR BLD AUTO: 4 % (ref 0–6)
ERYTHROCYTE [DISTWIDTH] IN BLOOD BY AUTOMATED COUNT: 13.3 % (ref 11.6–15.1)
FOLATE SERPL-MCNC: 17.1 NG/ML (ref 3.1–17.5)
GFR SERPL CREATININE-BSD FRML MDRD: 56 ML/MIN/1.73SQ M
GLUCOSE P FAST SERPL-MCNC: 107 MG/DL (ref 65–99)
HCT VFR BLD AUTO: 40.3 % (ref 34.8–46.1)
HDLC SERPL-MCNC: 99 MG/DL
HGB BLD-MCNC: 12.9 G/DL (ref 11.5–15.4)
IMM GRANULOCYTES # BLD AUTO: 0.01 THOUSAND/UL (ref 0–0.2)
IMM GRANULOCYTES NFR BLD AUTO: 0 % (ref 0–2)
LDLC SERPL CALC-MCNC: 91 MG/DL (ref 0–100)
LYMPHOCYTES # BLD AUTO: 1.26 THOUSANDS/ÂΜL (ref 0.6–4.47)
LYMPHOCYTES NFR BLD AUTO: 22 % (ref 14–44)
MCH RBC QN AUTO: 31.8 PG (ref 26.8–34.3)
MCHC RBC AUTO-ENTMCNC: 32 G/DL (ref 31.4–37.4)
MCV RBC AUTO: 99 FL (ref 82–98)
MONOCYTES # BLD AUTO: 0.39 THOUSAND/ÂΜL (ref 0.17–1.22)
MONOCYTES NFR BLD AUTO: 7 % (ref 4–12)
NEUTROPHILS # BLD AUTO: 3.94 THOUSANDS/ÂΜL (ref 1.85–7.62)
NEUTS SEG NFR BLD AUTO: 66 % (ref 43–75)
NONHDLC SERPL-MCNC: 103 MG/DL
NRBC BLD AUTO-RTO: 0 /100 WBCS
PLATELET # BLD AUTO: 260 THOUSANDS/UL (ref 149–390)
PMV BLD AUTO: 9.8 FL (ref 8.9–12.7)
POTASSIUM SERPL-SCNC: 3.8 MMOL/L (ref 3.5–5.3)
PROT SERPL-MCNC: 7.9 G/DL (ref 6.4–8.4)
RBC # BLD AUTO: 4.06 MILLION/UL (ref 3.81–5.12)
SODIUM SERPL-SCNC: 138 MMOL/L (ref 135–147)
TRIGL SERPL-MCNC: 59 MG/DL
TSH SERPL DL<=0.05 MIU/L-ACNC: 1.95 UIU/ML (ref 0.45–4.5)
VIT B12 SERPL-MCNC: 722 PG/ML (ref 100–900)
WBC # BLD AUTO: 5.85 THOUSAND/UL (ref 4.31–10.16)

## 2022-11-08 LAB
BACTERIA UR QL AUTO: ABNORMAL /HPF
BILIRUB UR QL STRIP: NEGATIVE
CLARITY UR: ABNORMAL
COLOR UR: ABNORMAL
GLUCOSE UR STRIP-MCNC: NEGATIVE MG/DL
HGB UR QL STRIP.AUTO: ABNORMAL
KETONES UR STRIP-MCNC: NEGATIVE MG/DL
LEUKOCYTE ESTERASE UR QL STRIP: ABNORMAL
MUCOUS THREADS UR QL AUTO: ABNORMAL
NITRITE UR QL STRIP: POSITIVE
NON-SQ EPI CELLS URNS QL MICRO: ABNORMAL /HPF
PH UR STRIP.AUTO: 5.5 [PH]
PROT UR STRIP-MCNC: ABNORMAL MG/DL
RBC #/AREA URNS AUTO: ABNORMAL /HPF
SP GR UR STRIP.AUTO: 1.02 (ref 1–1.03)
UROBILINOGEN UR STRIP-ACNC: 2 MG/DL
WBC #/AREA URNS AUTO: ABNORMAL /HPF

## 2022-11-09 ENCOUNTER — TELEPHONE (OUTPATIENT)
Dept: INTERNAL MEDICINE CLINIC | Age: 77
End: 2022-11-09

## 2022-11-09 LAB — BACTERIA UR CULT: NORMAL

## 2022-11-09 NOTE — TELEPHONE ENCOUNTER
Tell her that the I reviewed the urine I am waiting for the culture reports culture report is not significant if she is symptomatic with the urinary frequency urgency or burning or fever I will call in the antibiotic otherwise just increasing the fluids will help

## 2022-11-11 ENCOUNTER — TELEPHONE (OUTPATIENT)
Dept: INTERNAL MEDICINE CLINIC | Age: 77
End: 2022-11-11

## 2022-11-11 DIAGNOSIS — N30.00 ACUTE CYSTITIS: Primary | ICD-10-CM

## 2022-11-11 RX ORDER — CEPHALEXIN 500 MG/1
500 CAPSULE ORAL EVERY 12 HOURS SCHEDULED
Qty: 10 CAPSULE | Refills: 0 | Status: SHIPPED | OUTPATIENT
Start: 2022-11-11 | End: 2022-11-16

## 2022-11-11 NOTE — TELEPHONE ENCOUNTER
Pt  Called she would like to discuss urine culture results  Also, if she needs to be prescribed a medication       Thank you

## 2022-11-11 NOTE — TELEPHONE ENCOUNTER
Discussed with patient  Her urine culture only shows <10,000cfu mixed contaminants but she is complaining of urinary frequency and dysuria  Ordered keflex 500mg BID for 5 days  Encouraged her to obtain repeat UA end of next week

## 2022-11-17 ENCOUNTER — SURGERY/PROCEDURE (OUTPATIENT)
Dept: URBAN - METROPOLITAN AREA SURGICAL CENTER 6 | Facility: SURGICAL CENTER | Age: 77
End: 2022-11-17

## 2022-11-17 DIAGNOSIS — H26.491: ICD-10-CM

## 2022-11-17 DIAGNOSIS — Z96.1: ICD-10-CM

## 2022-11-17 PROCEDURE — 66821 AFTER CATARACT LASER SURGERY: CPT

## 2022-11-18 ENCOUNTER — APPOINTMENT (OUTPATIENT)
Dept: LAB | Facility: CLINIC | Age: 77
End: 2022-11-18

## 2022-11-18 DIAGNOSIS — N30.00 ACUTE CYSTITIS: ICD-10-CM

## 2022-11-18 LAB
BACTERIA UR QL AUTO: ABNORMAL /HPF
BILIRUB UR QL STRIP: NEGATIVE
CLARITY UR: ABNORMAL
COLOR UR: YELLOW
GLUCOSE UR STRIP-MCNC: NEGATIVE MG/DL
HGB UR QL STRIP.AUTO: ABNORMAL
KETONES UR STRIP-MCNC: NEGATIVE MG/DL
LEUKOCYTE ESTERASE UR QL STRIP: ABNORMAL
MUCOUS THREADS UR QL AUTO: ABNORMAL
NITRITE UR QL STRIP: NEGATIVE
NON-SQ EPI CELLS URNS QL MICRO: ABNORMAL /HPF
PH UR STRIP.AUTO: 5.5 [PH]
PROT UR STRIP-MCNC: ABNORMAL MG/DL
RBC #/AREA URNS AUTO: ABNORMAL /HPF
SP GR UR STRIP.AUTO: 1.01 (ref 1–1.03)
UROBILINOGEN UR STRIP-ACNC: <2 MG/DL
WBC #/AREA URNS AUTO: ABNORMAL /HPF
WBC CLUMPS # UR AUTO: PRESENT /UL

## 2022-11-18 NOTE — TELEPHONE ENCOUNTER
Pt called the office and stated that she just finished her abx but she is still having sx, she is going to get a UA done at Urgent Care in [address]  She said she is going to just have an appt there, looking to see if anything can be done since she is still having sx  Just wanted to let you know

## 2022-11-21 DIAGNOSIS — N30.00 ACUTE CYSTITIS: Primary | ICD-10-CM

## 2022-11-21 RX ORDER — AMOXICILLIN AND CLAVULANATE POTASSIUM 875; 125 MG/1; MG/1
1 TABLET, FILM COATED ORAL EVERY 12 HOURS SCHEDULED
Qty: 10 TABLET | Refills: 0 | Status: SHIPPED | OUTPATIENT
Start: 2022-11-21 | End: 2022-11-26

## 2022-11-22 LAB
BACTERIA UR CULT: ABNORMAL
BACTERIA UR CULT: ABNORMAL

## 2022-11-29 ENCOUNTER — POST-OP CHECK (OUTPATIENT)
Dept: URBAN - METROPOLITAN AREA CLINIC 6 | Facility: CLINIC | Age: 77
End: 2022-11-29

## 2022-11-29 DIAGNOSIS — Z96.1: ICD-10-CM

## 2022-11-29 PROCEDURE — 92015 DETERMINE REFRACTIVE STATE: CPT

## 2022-11-29 PROCEDURE — 99024 POSTOP FOLLOW-UP VISIT: CPT

## 2022-11-29 ASSESSMENT — VISUAL ACUITY
OD_CC: 20/30-2
OS_CC: 20/25

## 2022-11-29 ASSESSMENT — TONOMETRY
OD_IOP_MMHG: 16
OS_IOP_MMHG: 15

## 2022-12-08 DIAGNOSIS — F33.1 MODERATE EPISODE OF RECURRENT MAJOR DEPRESSIVE DISORDER (HCC): ICD-10-CM

## 2022-12-08 DIAGNOSIS — N32.81 OAB (OVERACTIVE BLADDER): ICD-10-CM

## 2022-12-08 RX ORDER — OXYBUTYNIN CHLORIDE 5 MG/1
5 TABLET, EXTENDED RELEASE ORAL 2 TIMES DAILY
Qty: 180 TABLET | Refills: 1 | Status: SHIPPED | OUTPATIENT
Start: 2022-12-08

## 2022-12-08 RX ORDER — LOPERAMIDE HYDROCHLORIDE 2 MG/1
2 CAPSULE ORAL 4 TIMES DAILY PRN
Qty: 120 CAPSULE | Refills: 1 | Status: SHIPPED | OUTPATIENT
Start: 2022-12-08

## 2022-12-09 ENCOUNTER — PATIENT MESSAGE (OUTPATIENT)
Dept: INTERNAL MEDICINE CLINIC | Age: 77
End: 2022-12-09

## 2023-03-30 ENCOUNTER — EVALUATION (OUTPATIENT)
Dept: PHYSICAL THERAPY | Facility: CLINIC | Age: 78
End: 2023-03-30

## 2023-03-30 DIAGNOSIS — G89.29 CHRONIC GLUTEAL PAIN: Primary | ICD-10-CM

## 2023-03-30 DIAGNOSIS — M79.18 CHRONIC GLUTEAL PAIN: Primary | ICD-10-CM

## 2023-03-30 NOTE — PROGRESS NOTES
PT Evaluation     Today's date: 3/30/2023  Patient name: Valentin Mckoy  : [id number]  MRN: [id number]  Referring provider: Real Bentiez  Dx:   Encounter Diagnosis     ICD-10-CM    1  Chronic gluteal pain  M79 18     G89 29           Start Time: 8306  Stop Time: 1130  Total time in clinic (min): 45 minutes    Assessment  Assessment details: Valentin Mckoy is a pleasant 65 y o  female who presents with R glute pain with hamstring tendon snapping over ischial tuberosity at 90 degrees with knee bent or straight  The patient's greatest concerns are worry over not knowing what's wrong, concern at no signs of improvement and fear of not being able to keep active  No further referral appears necessary at this time based upon examination results  Primary movement impairment diagnosis of R HS tendinopathy resulting in pathoanatomical symptoms of Chronic gluteal pain  (primary encounter diagnosis) and limiting her ability to get out of a chair, perform household chores, squat to  objects from the floor, stand and garden  Impairments include:  1) R HS tightness  2) Weakness in glutes    Discussed risks, benefits, and alternatives to treatment, and answered all patient questions to patient satisfaction    Impairments: abnormal muscle firing, abnormal muscle tone, abnormal or restricted ROM, abnormal movement, impaired physical strength, lacks appropriate home exercise program, pain with function and poor posture   Understanding of Dx/Px/POC: good   Prognosis: good    Goals  Impairment Goals 4-6 weeks  - Decrease pain to <3/10  - Improve lumbar AROM to WNL  - Increase hip strength to 4+/5 throughout    Functional Goals 6-8 weeks  - Return to Prior Level of Function  - Patient will be independent with HEP  - Patient will be able to bend forward without increased pain/compensation/difficulty  - Patient will be able to perform sit to stand without increased pain/compensation/difficulty   - Patient will be able to "garden without increased pain/compensation/difficulty       Plan  Patient would benefit from: skilled physical therapy  Planned therapy interventions: abdominal trunk stabilization, behavior modification, body mechanics training, breathing training, flexibility, functional ROM exercises, home exercise program, joint mobilization, manual therapy, massage, Talavera taping, muscle pump exercises, neuromuscular re-education, patient education, postural training, strengthening, stretching, therapeutic activities, therapeutic exercise and therapeutic training  Frequency: 1x week  Duration in weeks: 4  Treatment plan discussed with: patient        Subjective Evaluation    History of Present Illness  Mechanism of injury: HOBBIES/EXERCISE:  Exercises for sciatic - SKTC, SLR, figure 4 S  PLOF:  Hx of breast CA 2012  HISTORY OF CURRENT INJURY:  Patient feels that she wants to learn how to properly stretch her hip flexors, glutes, and hamstrings  She wants to know how to move better in her daily life  She feels she sits a lot which makes everything tighter  She feels stiff with mobility  She is walking the same she was before and does not want to work on this specifically  She wishes to just learn stretches  She has a long hx of L sciatic pain, and has been exercising at home and this is improving  She feels the snapping pain in her R glute when she stands from the rollator or a chair every time, and it gets more painful the more she does it  PAIN LOCATION/DESCRIPTORS: Pain in glute with \"snapping sensation\" on R side when she gets up and down from the rollator  AGGRAVATING FACTORS:  Working in the garden, prolonged sitting, bending forwards, getting up from rollator or chair  EASES:  Rest, stretching  DAY PATTERN: activity dependent  IMAGING:  none  SPECIAL QUESTIONS:  Patient has hx of CA and neuropathy  PELVIC FLOOR: Do you have pelvic pain or chronic constipation?  Patient has ileostomy   PATIENT GOALS: Goal is to learn " the appropriate stretches  Pain  Current pain ratin  At best pain ratin  At worst pain ratin  Location: R glute  Quality: sharp and tight  Progression: no change    Patient Goals  Patient goals for therapy: increased motion, decreased pain, return to sport/leisure activities and independence with ADLs/IADLs          Objective     Active Range of Motion     Lumbar   Flexion: 80 degrees  with pain  Extension: 0 degrees   Left lateral flexion:  Restriction level: maximal  Right lateral flexion:  Restriction level: maximal  Left rotation:  Restriction level: moderate  Right rotation:  Restriction level: moderate    Additional Active Range of Motion Details  Asterisks glute pain and snap of hamstring in L glute and end range of FF    Strength/Myotome Testing     Left Hip   Planes of Motion   Flexion: 3+  Extension: 3+  Abduction: 3+  External rotation: 3+  Internal rotation: 3+    Right Hip   Planes of Motion   Flexion: 3+  Extension: 3+  Abduction: 3+  External rotation: 3+  Internal rotation: 3+    Left Knee   Flexion: 4-  Extension: 4-    Right Knee   Flexion: 4-  Extension: 4-    Left Ankle/Foot   Dorsiflexion: 4-    Right Ankle/Foot   Dorsiflexion: 4-    Additional Strength Details  Tightness in R HS > L, minimal tightness in scarlet quads and hip flexors  Able to recreate snapping in R glute consistently with PROM of R hip into flexion  Recreated at about 90 degrees hip flexion with asterisks discomfort  Palpable rolling of R HS tendon at ischial tuberosity        General Comments:      Lumbar Comments  Sit to stand: asterisks R glute pain with sit to stand             Diagnosis: R glute pain, hamstring tendinopathy   Precautions: Hx of CA, osteoporosis    Primary Goals: 1) R HS tightness  2) Weakness in glutes   *asterisks by exercise = given for HEP   Manuals 3/30       Rolling R HS/IASTM?                                         There Ex        Bike        HS S 3x30 sec scarlet in chair       Hip flexor S Quad S        LTR                                        Neuro Re-Ed        [employer]        SL SLR        HS isometrics        Glute sets                                                         Re-evaluation              Ther Act                                         Modalities

## 2023-03-30 NOTE — LETTER
2023    [address]    Patient: Fritz Dupont   YOB: 1945   Date of Visit: 3/30/2023     Encounter Diagnosis     ICD-10-CM    1  Chronic gluteal pain  M79 18     G89 29           Dear Dr Qiana Marley:    Thank you for your recent referral of Fritz Dupont  Please review the attached evaluation summary from Joycelyn's recent visit  Please verify that you agree with the plan of care by signing the attached order  If you have any questions or concerns, please do not hesitate to call  I sincerely appreciate the opportunity to share in the care of one of your patients and hope to have another opportunity to work with you in the near future  Sincerely,    Jovan Alarcon, PT      Referring Provider:      I certify that I have read the below Plan of Care and certify the need for these services furnished under this plan of treatment while under my care  [address]  Via Fax: [phone]          PT Evaluation     Today's date: 3/30/2023  Patient name: Fritz Dupont  :   MRN: [id number]  Referring provider: Narendra Packer  Dx:   Encounter Diagnosis     ICD-10-CM    1  Chronic gluteal pain  M79 18     G89 29           Start Time:   Stop Time: 1130  Total time in clinic (min): 45 minutes    Assessment  Assessment details: Fritz Dupont is a pleasant 65 y o  female who presents with R glute pain with hamstring tendon snapping over ischial tuberosity at 90 degrees with knee bent or straight  The patient's greatest concerns are worry over not knowing what's wrong, concern at no signs of improvement and fear of not being able to keep active  No further referral appears necessary at this time based upon examination results      Primary movement impairment diagnosis of R HS tendinopathy resulting in pathoanatomical symptoms of Chronic gluteal pain  (primary encounter diagnosis) and limiting her ability to get out of a chair, perform household chores, squat to  objects from the floor, stand and garden  Impairments include:  1) R HS tightness  2) Weakness in glutes    Discussed risks, benefits, and alternatives to treatment, and answered all patient questions to patient satisfaction    Impairments: abnormal muscle firing, abnormal muscle tone, abnormal or restricted ROM, abnormal movement, impaired physical strength, lacks appropriate home exercise program, pain with function and poor posture   Understanding of Dx/Px/POC: good   Prognosis: good    Goals  Impairment Goals 4-6 weeks  - Decrease pain to <3/10  - Improve lumbar AROM to WNL  - Increase hip strength to 4+/5 throughout    Functional Goals 6-8 weeks  - Return to Prior Level of Function  - Patient will be independent with HEP  - Patient will be able to bend forward without increased pain/compensation/difficulty  - Patient will be able to perform sit to stand without increased pain/compensation/difficulty   - Patient will be able to garden without increased pain/compensation/difficulty       Plan  Patient would benefit from: skilled physical therapy  Planned therapy interventions: abdominal trunk stabilization, behavior modification, body mechanics training, breathing training, flexibility, functional ROM exercises, home exercise program, joint mobilization, manual therapy, massage, Talavera taping, muscle pump exercises, neuromuscular re-education, patient education, postural training, strengthening, stretching, therapeutic activities, therapeutic exercise and therapeutic training  Frequency: 1x week  Duration in weeks: 4  Treatment plan discussed with: patient        Subjective Evaluation    History of Present Illness  Mechanism of injury: HOBBIES/EXERCISE:  Exercises for sciatic - SKTC, SLR, figure 4 S  PLOF:  Hx of breast CA 2012  HISTORY OF CURRENT INJURY:  Patient feels that she wants to learn how to "properly stretch her hip flexors, glutes, and hamstrings  She wants to know how to move better in her daily life  She feels she sits a lot which makes everything tighter  She feels stiff with mobility  She is walking the same she was before and does not want to work on this specifically  She wishes to just learn stretches  She has a long hx of L sciatic pain, and has been exercising at home and this is improving  She feels the snapping pain in her R glute when she stands from the rollator or a chair every time, and it gets more painful the more she does it  PAIN LOCATION/DESCRIPTORS: Pain in glute with \"snapping sensation\" on R side when she gets up and down from the rollator  AGGRAVATING FACTORS:  Working in the garden, prolonged sitting, bending forwards, getting up from rollator or chair  EASES:  Rest, stretching  DAY PATTERN: activity dependent  IMAGING:  none  SPECIAL QUESTIONS:  Patient has hx of CA and neuropathy  PELVIC FLOOR: Do you have pelvic pain or chronic constipation? Patient has ileostomy   PATIENT GOALS: Goal is to learn the appropriate stretches       Pain  Current pain ratin  At best pain ratin  At worst pain ratin  Location: R glute  Quality: sharp and tight  Progression: no change    Patient Goals  Patient goals for therapy: increased motion, decreased pain, return to sport/leisure activities and independence with ADLs/IADLs          Objective     Active Range of Motion     Lumbar   Flexion: 80 degrees  with pain  Extension: 0 degrees   Left lateral flexion:  Restriction level: maximal  Right lateral flexion:  Restriction level: maximal  Left rotation:  Restriction level: moderate  Right rotation:  Restriction level: moderate    Additional Active Range of Motion Details  Asterisks glute pain and snap of hamstring in L glute and end range of FF    Strength/Myotome Testing     Left Hip   Planes of Motion   Flexion: 3+  Extension: 3+  Abduction: 3+  External rotation: 3+  Internal " rotation: 3+    Right Hip   Planes of Motion   Flexion: 3+  Extension: 3+  Abduction: 3+  External rotation: 3+  Internal rotation: 3+    Left Knee   Flexion: 4-  Extension: 4-    Right Knee   Flexion: 4-  Extension: 4-    Left Ankle/Foot   Dorsiflexion: 4-    Right Ankle/Foot   Dorsiflexion: 4-    Additional Strength Details  Tightness in R HS > L, minimal tightness in scarlet quads and hip flexors  Able to recreate snapping in R glute consistently with PROM of R hip into flexion  Recreated at about 90 degrees hip flexion with asterisks discomfort  Palpable rolling of R HS tendon at ischial tuberosity        General Comments:      Lumbar Comments  Sit to stand: asterisks R glute pain with sit to stand            Diagnosis: R glute pain, hamstring tendinopathy   Precautions: Hx of CA, osteoporosis    Primary Goals: 1) R HS tightness  2) Weakness in glutes   *asterisks by exercise = given for HEP   Manuals 3/30       Rolling R HS/IASTM?                                         There Ex        Bike        HS S 3x30 sec scarlet in chair       Hip flexor S        Quad S        LTR                                        Neuro Re-Ed        Bridge        Clam        SLR        SL SLR        HS isometrics        Glute sets                                                         Re-evaluation              Ther Act                                         Modalities

## 2023-04-06 ENCOUNTER — APPOINTMENT (OUTPATIENT)
Dept: PHYSICAL THERAPY | Facility: CLINIC | Age: 78
End: 2023-04-06

## 2023-04-20 ENCOUNTER — APPOINTMENT (OUTPATIENT)
Dept: PHYSICAL THERAPY | Facility: CLINIC | Age: 78
End: 2023-04-20

## 2023-04-27 ENCOUNTER — OFFICE VISIT (OUTPATIENT)
Dept: PHYSICAL THERAPY | Facility: CLINIC | Age: 78
End: 2023-04-27

## 2023-04-27 DIAGNOSIS — M79.18 CHRONIC GLUTEAL PAIN: Primary | ICD-10-CM

## 2023-04-27 DIAGNOSIS — G89.29 CHRONIC GLUTEAL PAIN: Primary | ICD-10-CM

## 2023-04-27 NOTE — PROGRESS NOTES
Daily Note     Today's date: 2023  Patient name: Mala Rivera  : [id number]  MRN: [id number]  Referring provider: Jocelin Martínez  Dx:   Encounter Diagnosis     ICD-10-CM    1  Chronic gluteal pain  M79 18     G89 29           Start Time: 1130  Stop Time: 1215  Total time in clinic (min): 45 minutes    Subjective: Patient states she is doing better  She feels the exercises are helping  She states she feels horrible that she got sick and missed some appts but is confident with doing them at home  Objective: See treatment diary below      Assessment: Continued with outlined program  Patient has good carryover for exercises evident of HEP compliance  She was able to perform standing hip flexor stretch with stool as she is unable to perform at home with her bed being too low  Patient performed SLR flexion which was added to HEP as well  Patient requires increase time for positional changes due to poor stability without AD  She has good understanding of HEP  Plan: [facility] to [facility]  Diagnosis: R glute pain, hamstring tendinopathy   Precautions: Hx of CA, osteoporosis    Primary Goals: 1) R HS tightness  2) Weakness in glutes   *asterisks by exercise = given for HEP   Manuals 3/30 4/13 4/27     Rolling R HS/IASTM?                                         There Ex        Bike   5 min RPE 4/10     HS S* 3x30 sec scarlet in chair  3x30 sec scarlet in chair     Hip flexor S*  30 sec x3 3x30 sec with stool      Quad S        LTR*  5 sec x10      Piriformis S*  30 sec x 3  3x30 sec scarlet                              Neuro Re-Ed        Bridge*  2x 10 2x10      Clam*  Supine YTB 2 x10 Supine YTB 2x10     SLR   Flex 2x10 scarlet      SL SLR        HS isometrics*  5 sec x10 5 sec x10     Glute sets*  5 sec x10 5 sec x10                                                      Re-evaluation              Ther Act                                         Modalities

## 2024-02-21 PROBLEM — S61.421A: Status: RESOLVED | Noted: 2019-11-01 | Resolved: 2024-02-21

## 2025-06-23 ENCOUNTER — APPOINTMENT (EMERGENCY)
Dept: RADIOLOGY | Facility: HOSPITAL | Age: 80
DRG: 563 | End: 2025-06-23
Payer: COMMERCIAL

## 2025-06-23 ENCOUNTER — APPOINTMENT (EMERGENCY)
Dept: CT IMAGING | Facility: HOSPITAL | Age: 80
DRG: 563 | End: 2025-06-23
Payer: COMMERCIAL

## 2025-06-23 ENCOUNTER — HOSPITAL ENCOUNTER (INPATIENT)
Facility: HOSPITAL | Age: 80
LOS: 3 days | Discharge: ANOTHER HOSPITAL | DRG: 563 | End: 2025-06-28
Attending: SURGERY | Admitting: SURGERY
Payer: COMMERCIAL

## 2025-06-23 ENCOUNTER — APPOINTMENT (OUTPATIENT)
Dept: CT IMAGING | Facility: HOSPITAL | Age: 80
DRG: 563 | End: 2025-06-23
Payer: COMMERCIAL
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
## 2025-06-23 DIAGNOSIS — T14.8XXA HEMATOMA: ICD-10-CM

## 2025-06-23 DIAGNOSIS — T14.8XXA SUPERFICIAL LACERATION: ICD-10-CM

## 2025-06-23 DIAGNOSIS — S92.001A CLOSED NONDISPLACED FRACTURE OF RIGHT CALCANEUS, UNSPECIFIED PORTION OF CALCANEUS, INITIAL ENCOUNTER: ICD-10-CM

## 2025-06-23 LAB
ABO GROUP BLD: NORMAL
ALBUMIN SERPL BCG-MCNC: 3.8 G/DL (ref 3.5–5)
ALBUMIN SERPL BCG-MCNC: 3.8 G/DL (ref 3.5–5)
ALP SERPL-CCNC: 36 U/L (ref 34–104)
ALP SERPL-CCNC: 36 U/L (ref 34–104)
ALT SERPL W P-5'-P-CCNC: 8 U/L (ref 7–52)
ALT SERPL W P-5'-P-CCNC: 8 U/L (ref 7–52)
ANION GAP SERPL CALCULATED.3IONS-SCNC: 7 MMOL/L (ref 4–13)
ANION GAP SERPL CALCULATED.3IONS-SCNC: 7 MMOL/L (ref 4–13)
AST SERPL W P-5'-P-CCNC: 19 U/L (ref 13–39)
AST SERPL W P-5'-P-CCNC: 19 U/L (ref 13–39)
BASE EXCESS BLDA CALC-SCNC: 2 MMOL/L (ref -2–3)
BASE EXCESS BLDA CALC-SCNC: 2 MMOL/L (ref -2–3)
BASOPHILS # BLD AUTO: 0.02 THOUSANDS/ÂΜL (ref 0–0.1)
BASOPHILS # BLD AUTO: 0.02 THOUSANDS/ÂΜL (ref 0–0.1)
BASOPHILS NFR BLD AUTO: 0 % (ref 0–1)
BASOPHILS NFR BLD AUTO: 0 % (ref 0–1)
BILIRUB SERPL-MCNC: 0.6 MG/DL (ref 0.2–1)
BILIRUB SERPL-MCNC: 0.6 MG/DL (ref 0.2–1)
BLD GP AB SCN SERPL QL: NEGATIVE
BLD GP AB SCN SERPL QL: NEGATIVE
BUN SERPL-MCNC: 20 MG/DL (ref 5–25)
BUN SERPL-MCNC: 20 MG/DL (ref 5–25)
CA-I BLD-SCNC: 1.23 MMOL/L (ref 1.12–1.32)
CA-I BLD-SCNC: 1.23 MMOL/L (ref 1.12–1.32)
CALCIUM SERPL-MCNC: 9.5 MG/DL (ref 8.4–10.2)
CALCIUM SERPL-MCNC: 9.5 MG/DL (ref 8.4–10.2)
CHLORIDE SERPL-SCNC: 108 MMOL/L (ref 96–108)
CHLORIDE SERPL-SCNC: 108 MMOL/L (ref 96–108)
CO2 SERPL-SCNC: 25 MMOL/L (ref 21–32)
CO2 SERPL-SCNC: 25 MMOL/L (ref 21–32)
CREAT SERPL-MCNC: 0.95 MG/DL (ref 0.6–1.3)
CREAT SERPL-MCNC: 0.95 MG/DL (ref 0.6–1.3)
EOSINOPHIL # BLD AUTO: 0.28 THOUSAND/ÂΜL (ref 0–0.61)
EOSINOPHIL # BLD AUTO: 0.28 THOUSAND/ÂΜL (ref 0–0.61)
EOSINOPHIL NFR BLD AUTO: 3 % (ref 0–6)
EOSINOPHIL NFR BLD AUTO: 3 % (ref 0–6)
ERYTHROCYTE [DISTWIDTH] IN BLOOD BY AUTOMATED COUNT: 13.7 % (ref 11.6–15.1)
ERYTHROCYTE [DISTWIDTH] IN BLOOD BY AUTOMATED COUNT: 13.7 % (ref 11.6–15.1)
GFR SERPL CREATININE-BSD FRML MDRD: 56 ML/MIN/1.73SQ M
GFR SERPL CREATININE-BSD FRML MDRD: 56 ML/MIN/1.73SQ M
GLUCOSE SERPL-MCNC: 138 MG/DL (ref 65–140)
GLUCOSE SERPL-MCNC: 138 MG/DL (ref 65–140)
GLUCOSE SERPL-MCNC: 95 MG/DL (ref 65–140)
GLUCOSE SERPL-MCNC: 95 MG/DL (ref 65–140)
HCO3 BLDA-SCNC: 25.9 MMOL/L (ref 24–30)
HCO3 BLDA-SCNC: 25.9 MMOL/L (ref 24–30)
HCT VFR BLD AUTO: 34.6 % (ref 34.8–46.1)
HCT VFR BLD AUTO: 34.6 % (ref 34.8–46.1)
HCT VFR BLD CALC: 36 % (ref 34.8–46.1)
HCT VFR BLD CALC: 36 % (ref 34.8–46.1)
HGB BLD-MCNC: 11.5 G/DL (ref 11.5–15.4)
HGB BLD-MCNC: 11.5 G/DL (ref 11.5–15.4)
HGB BLDA-MCNC: 12.2 G/DL (ref 11.5–15.4)
HGB BLDA-MCNC: 12.2 G/DL (ref 11.5–15.4)
IMM GRANULOCYTES # BLD AUTO: 0.02 THOUSAND/UL (ref 0–0.2)
IMM GRANULOCYTES # BLD AUTO: 0.02 THOUSAND/UL (ref 0–0.2)
IMM GRANULOCYTES NFR BLD AUTO: 0 % (ref 0–2)
IMM GRANULOCYTES NFR BLD AUTO: 0 % (ref 0–2)
LYMPHOCYTES # BLD AUTO: 1.4 THOUSANDS/ÂΜL (ref 0.6–4.47)
LYMPHOCYTES # BLD AUTO: 1.4 THOUSANDS/ÂΜL (ref 0.6–4.47)
LYMPHOCYTES NFR BLD AUTO: 17 % (ref 14–44)
LYMPHOCYTES NFR BLD AUTO: 17 % (ref 14–44)
MCH RBC QN AUTO: 31.1 PG (ref 26.8–34.3)
MCH RBC QN AUTO: 31.1 PG (ref 26.8–34.3)
MCHC RBC AUTO-ENTMCNC: 33.2 G/DL (ref 31.4–37.4)
MCHC RBC AUTO-ENTMCNC: 33.2 G/DL (ref 31.4–37.4)
MCV RBC AUTO: 94 FL (ref 82–98)
MCV RBC AUTO: 94 FL (ref 82–98)
MONOCYTES # BLD AUTO: 0.57 THOUSAND/ÂΜL (ref 0.17–1.22)
MONOCYTES # BLD AUTO: 0.57 THOUSAND/ÂΜL (ref 0.17–1.22)
MONOCYTES NFR BLD AUTO: 7 % (ref 4–12)
MONOCYTES NFR BLD AUTO: 7 % (ref 4–12)
NEUTROPHILS # BLD AUTO: 6.14 THOUSANDS/ÂΜL (ref 1.85–7.62)
NEUTROPHILS # BLD AUTO: 6.14 THOUSANDS/ÂΜL (ref 1.85–7.62)
NEUTS SEG NFR BLD AUTO: 73 % (ref 43–75)
NEUTS SEG NFR BLD AUTO: 73 % (ref 43–75)
NRBC BLD AUTO-RTO: 0 /100 WBCS
NRBC BLD AUTO-RTO: 0 /100 WBCS
PCO2 BLD: 27 MMOL/L (ref 21–32)
PCO2 BLD: 27 MMOL/L (ref 21–32)
PCO2 BLD: 38.7 MM HG (ref 42–50)
PCO2 BLD: 38.7 MM HG (ref 42–50)
PH BLD: 7.43 [PH] (ref 7.3–7.4)
PH BLD: 7.43 [PH] (ref 7.3–7.4)
PLATELET # BLD AUTO: 202 THOUSANDS/UL (ref 149–390)
PLATELET # BLD AUTO: 202 THOUSANDS/UL (ref 149–390)
PMV BLD AUTO: 9.6 FL (ref 8.9–12.7)
PMV BLD AUTO: 9.6 FL (ref 8.9–12.7)
PO2 BLD: 32 MM HG (ref 35–45)
PO2 BLD: 32 MM HG (ref 35–45)
POTASSIUM BLD-SCNC: 4 MMOL/L (ref 3.5–5.3)
POTASSIUM BLD-SCNC: 4 MMOL/L (ref 3.5–5.3)
POTASSIUM SERPL-SCNC: 3.7 MMOL/L (ref 3.5–5.3)
POTASSIUM SERPL-SCNC: 3.7 MMOL/L (ref 3.5–5.3)
PROT SERPL-MCNC: 6.3 G/DL (ref 6.4–8.4)
PROT SERPL-MCNC: 6.3 G/DL (ref 6.4–8.4)
RBC # BLD AUTO: 3.7 MILLION/UL (ref 3.81–5.12)
RBC # BLD AUTO: 3.7 MILLION/UL (ref 3.81–5.12)
RH BLD: POSITIVE
SAO2 % BLD FROM PO2: 63 % (ref 60–85)
SAO2 % BLD FROM PO2: 63 % (ref 60–85)
SODIUM BLD-SCNC: 139 MMOL/L (ref 136–145)
SODIUM BLD-SCNC: 139 MMOL/L (ref 136–145)
SODIUM SERPL-SCNC: 140 MMOL/L (ref 135–147)
SODIUM SERPL-SCNC: 140 MMOL/L (ref 135–147)
SPECIMEN EXPIRATION DATE: NORMAL
SPECIMEN EXPIRATION DATE: NORMAL
SPECIMEN SOURCE: ABNORMAL
SPECIMEN SOURCE: ABNORMAL
WBC # BLD AUTO: 8.43 THOUSAND/UL (ref 4.31–10.16)
WBC # BLD AUTO: 8.43 THOUSAND/UL (ref 4.31–10.16)

## 2025-06-23 PROCEDURE — 12001 RPR S/N/AX/GEN/TRNK 2.5CM/<: CPT | Performed by: SURGERY

## 2025-06-23 PROCEDURE — 84295 ASSAY OF SERUM SODIUM: CPT

## 2025-06-23 PROCEDURE — 71045 X-RAY EXAM CHEST 1 VIEW: CPT

## 2025-06-23 PROCEDURE — 73090 X-RAY EXAM OF FOREARM: CPT

## 2025-06-23 PROCEDURE — 70450 CT HEAD/BRAIN W/O DYE: CPT

## 2025-06-23 PROCEDURE — 82803 BLOOD GASES ANY COMBINATION: CPT

## 2025-06-23 PROCEDURE — 73130 X-RAY EXAM OF HAND: CPT

## 2025-06-23 PROCEDURE — 82947 ASSAY GLUCOSE BLOOD QUANT: CPT

## 2025-06-23 PROCEDURE — 86901 BLOOD TYPING SEROLOGIC RH(D): CPT | Performed by: SURGERY

## 2025-06-23 PROCEDURE — 73610 X-RAY EXAM OF ANKLE: CPT

## 2025-06-23 PROCEDURE — 90471 IMMUNIZATION ADMIN: CPT

## 2025-06-23 PROCEDURE — 84132 ASSAY OF SERUM POTASSIUM: CPT

## 2025-06-23 PROCEDURE — 73700 CT LOWER EXTREMITY W/O DYE: CPT

## 2025-06-23 PROCEDURE — 85025 COMPLETE CBC W/AUTO DIFF WBC: CPT

## 2025-06-23 PROCEDURE — 86900 BLOOD TYPING SEROLOGIC ABO: CPT | Performed by: SURGERY

## 2025-06-23 PROCEDURE — 85014 HEMATOCRIT: CPT

## 2025-06-23 PROCEDURE — 36415 COLL VENOUS BLD VENIPUNCTURE: CPT | Performed by: SURGERY

## 2025-06-23 PROCEDURE — 80053 COMPREHEN METABOLIC PANEL: CPT

## 2025-06-23 PROCEDURE — 90715 TDAP VACCINE 7 YRS/> IM: CPT

## 2025-06-23 PROCEDURE — 82330 ASSAY OF CALCIUM: CPT

## 2025-06-23 PROCEDURE — 86850 RBC ANTIBODY SCREEN: CPT | Performed by: SURGERY

## 2025-06-23 PROCEDURE — EDAIR PR ED AIR: Performed by: EMERGENCY MEDICINE

## 2025-06-23 PROCEDURE — 99223 1ST HOSP IP/OBS HIGH 75: CPT | Performed by: SURGERY

## 2025-06-23 PROCEDURE — 72125 CT NECK SPINE W/O DYE: CPT

## 2025-06-23 PROCEDURE — 99284 EMERGENCY DEPT VISIT MOD MDM: CPT

## 2025-06-23 RX ORDER — OXYCODONE HYDROCHLORIDE 5 MG/1
5 TABLET ORAL EVERY 4 HOURS PRN
Refills: 0 | Status: DISCONTINUED | OUTPATIENT
Start: 2025-06-23 | End: 2025-06-28 | Stop reason: HOSPADM

## 2025-06-23 RX ORDER — GABAPENTIN 400 MG/1
400 CAPSULE ORAL 3 TIMES DAILY
Status: DISCONTINUED | OUTPATIENT
Start: 2025-06-23 | End: 2025-06-25

## 2025-06-23 RX ORDER — HYDROMORPHONE HCL IN WATER/PF 6 MG/30 ML
0.2 PATIENT CONTROLLED ANALGESIA SYRINGE INTRAVENOUS EVERY 2 HOUR PRN
Refills: 0 | Status: DISCONTINUED | OUTPATIENT
Start: 2025-06-23 | End: 2025-06-26

## 2025-06-23 RX ORDER — ACETAMINOPHEN 325 MG/1
975 TABLET ORAL EVERY 8 HOURS SCHEDULED
Status: DISCONTINUED | OUTPATIENT
Start: 2025-06-23 | End: 2025-06-28 | Stop reason: HOSPADM

## 2025-06-23 RX ORDER — LIDOCAINE HYDROCHLORIDE AND EPINEPHRINE 10; 10 MG/ML; UG/ML
10 INJECTION, SOLUTION INFILTRATION; PERINEURAL ONCE
Status: DISCONTINUED | OUTPATIENT
Start: 2025-06-23 | End: 2025-06-23

## 2025-06-23 RX ORDER — PRAVASTATIN SODIUM 40 MG
40 TABLET ORAL
Status: DISCONTINUED | OUTPATIENT
Start: 2025-06-24 | End: 2025-06-28 | Stop reason: HOSPADM

## 2025-06-23 RX ORDER — SIMVASTATIN 20 MG
20 TABLET ORAL
Status: ON HOLD | COMMUNITY

## 2025-06-23 RX ORDER — GINSENG 100 MG
1 CAPSULE ORAL ONCE
Status: DISCONTINUED | OUTPATIENT
Start: 2025-06-23 | End: 2025-06-23

## 2025-06-23 RX ORDER — GABAPENTIN 400 MG/1
400 CAPSULE ORAL 3 TIMES DAILY
Status: ON HOLD | COMMUNITY

## 2025-06-23 RX ORDER — METOPROLOL SUCCINATE 25 MG/1
25 TABLET, EXTENDED RELEASE ORAL DAILY
Status: DISCONTINUED | OUTPATIENT
Start: 2025-06-24 | End: 2025-06-24

## 2025-06-23 RX ORDER — METOPROLOL SUCCINATE 25 MG/1
25 TABLET, EXTENDED RELEASE ORAL DAILY
Status: ON HOLD | COMMUNITY

## 2025-06-23 RX ADMIN — ACETAMINOPHEN 975 MG: 325 TABLET, FILM COATED ORAL at 22:44

## 2025-06-23 RX ADMIN — GABAPENTIN 400 MG: 400 CAPSULE ORAL at 22:44

## 2025-06-23 RX ADMIN — TETANUS TOXOID, REDUCED DIPHTHERIA TOXOID AND ACELLULAR PERTUSSIS VACCINE, ADSORBED 0.5 ML: 5; 2.5; 8; 8; 2.5 SUSPENSION INTRAMUSCULAR at 18:04
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
## 2025-06-23 NOTE — ED PROCEDURE NOTE
Procedure    Universal Protocol:  procedure performed by consultantConsent: Verbal consent obtained  Risks and benefits: risks, benefits and alternatives were discussed  Consent given by: patient  Timeout called at: 6/23/2025 7:00 PM.  Patient understanding: patient states understanding of the procedure being performed  Patient consent: the patient's understanding of the procedure matches consent given  Procedure consent: procedure consent matches procedure scheduled  Patient identity confirmed: verbally with patient, arm band and hospital-assigned identification number  Laceration repair    Date/Time: 6/23/2025 7:19 PM    Performed by: Guillermo Medina MD  Authorized by: Guillermo Medina MD  Location: left thumb and right forearm.  Foreign bodies: no foreign bodies  Tendon involvement: none  Nerve involvement: none  Vascular damage: no  Anesthesia method: none.    Sedation:  Patient sedated: no      Wound Dehiscence:  Superficial Wound Dehiscence: simple closure      Procedure Details:  Preparation: Patient was prepped and draped in the usual sterile fashion.  Irrigation solution: saline  Irrigation method: tap  Amount of cleaning: standard  Debridement: none  Degree of undermining: none  Skin closure: glue and Steri-Strips  Number of sutures: 5  Technique: simple  Approximation: close  Approximation difficulty: simple  Dressing: 4x4 sterile gauze, pressure dressing and gauze roll (surgicel foam and net)  Patient tolerance: patient tolerated the procedure well with no immediate complications  Comments: 1 cm laceration on base of left thumb that was glued. 5 cm laceration on right forearm. Closed with 5 steri strips and pressure dressing.                       Guillermo Medina MD  06/23/25 1929
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
## 2025-06-24 LAB
ANION GAP SERPL CALCULATED.3IONS-SCNC: 6 MMOL/L (ref 4–13)
ANION GAP SERPL CALCULATED.3IONS-SCNC: 6 MMOL/L (ref 4–13)
BASOPHILS # BLD AUTO: 0.02 THOUSANDS/ÂΜL (ref 0–0.1)
BASOPHILS # BLD AUTO: 0.02 THOUSANDS/ÂΜL (ref 0–0.1)
BASOPHILS NFR BLD AUTO: 0 % (ref 0–1)
BASOPHILS NFR BLD AUTO: 0 % (ref 0–1)
BUN SERPL-MCNC: 24 MG/DL (ref 5–25)
BUN SERPL-MCNC: 24 MG/DL (ref 5–25)
CALCIUM SERPL-MCNC: 9.4 MG/DL (ref 8.4–10.2)
CALCIUM SERPL-MCNC: 9.4 MG/DL (ref 8.4–10.2)
CHLORIDE SERPL-SCNC: 108 MMOL/L (ref 96–108)
CHLORIDE SERPL-SCNC: 108 MMOL/L (ref 96–108)
CO2 SERPL-SCNC: 27 MMOL/L (ref 21–32)
CO2 SERPL-SCNC: 27 MMOL/L (ref 21–32)
CREAT SERPL-MCNC: 0.99 MG/DL (ref 0.6–1.3)
CREAT SERPL-MCNC: 0.99 MG/DL (ref 0.6–1.3)
EOSINOPHIL # BLD AUTO: 0.24 THOUSAND/ÂΜL (ref 0–0.61)
EOSINOPHIL # BLD AUTO: 0.24 THOUSAND/ÂΜL (ref 0–0.61)
EOSINOPHIL NFR BLD AUTO: 4 % (ref 0–6)
EOSINOPHIL NFR BLD AUTO: 4 % (ref 0–6)
ERYTHROCYTE [DISTWIDTH] IN BLOOD BY AUTOMATED COUNT: 13.9 % (ref 11.6–15.1)
ERYTHROCYTE [DISTWIDTH] IN BLOOD BY AUTOMATED COUNT: 13.9 % (ref 11.6–15.1)
GFR SERPL CREATININE-BSD FRML MDRD: 53 ML/MIN/1.73SQ M
GFR SERPL CREATININE-BSD FRML MDRD: 53 ML/MIN/1.73SQ M
GLUCOSE P FAST SERPL-MCNC: 107 MG/DL (ref 65–99)
GLUCOSE P FAST SERPL-MCNC: 107 MG/DL (ref 65–99)
GLUCOSE SERPL-MCNC: 107 MG/DL (ref 65–140)
GLUCOSE SERPL-MCNC: 107 MG/DL (ref 65–140)
HCT VFR BLD AUTO: 34.5 % (ref 34.8–46.1)
HCT VFR BLD AUTO: 34.5 % (ref 34.8–46.1)
HGB BLD-MCNC: 11.3 G/DL (ref 11.5–15.4)
HGB BLD-MCNC: 11.3 G/DL (ref 11.5–15.4)
IMM GRANULOCYTES # BLD AUTO: 0.02 THOUSAND/UL (ref 0–0.2)
IMM GRANULOCYTES # BLD AUTO: 0.02 THOUSAND/UL (ref 0–0.2)
IMM GRANULOCYTES NFR BLD AUTO: 0 % (ref 0–2)
IMM GRANULOCYTES NFR BLD AUTO: 0 % (ref 0–2)
LYMPHOCYTES # BLD AUTO: 1.33 THOUSANDS/ÂΜL (ref 0.6–4.47)
LYMPHOCYTES # BLD AUTO: 1.33 THOUSANDS/ÂΜL (ref 0.6–4.47)
LYMPHOCYTES NFR BLD AUTO: 22 % (ref 14–44)
LYMPHOCYTES NFR BLD AUTO: 22 % (ref 14–44)
MCH RBC QN AUTO: 31.5 PG (ref 26.8–34.3)
MCH RBC QN AUTO: 31.5 PG (ref 26.8–34.3)
MCHC RBC AUTO-ENTMCNC: 32.8 G/DL (ref 31.4–37.4)
MCHC RBC AUTO-ENTMCNC: 32.8 G/DL (ref 31.4–37.4)
MCV RBC AUTO: 96 FL (ref 82–98)
MCV RBC AUTO: 96 FL (ref 82–98)
MONOCYTES # BLD AUTO: 0.58 THOUSAND/ÂΜL (ref 0.17–1.22)
MONOCYTES # BLD AUTO: 0.58 THOUSAND/ÂΜL (ref 0.17–1.22)
MONOCYTES NFR BLD AUTO: 10 % (ref 4–12)
MONOCYTES NFR BLD AUTO: 10 % (ref 4–12)
NEUTROPHILS # BLD AUTO: 3.82 THOUSANDS/ÂΜL (ref 1.85–7.62)
NEUTROPHILS # BLD AUTO: 3.82 THOUSANDS/ÂΜL (ref 1.85–7.62)
NEUTS SEG NFR BLD AUTO: 64 % (ref 43–75)
NEUTS SEG NFR BLD AUTO: 64 % (ref 43–75)
NRBC BLD AUTO-RTO: 0 /100 WBCS
NRBC BLD AUTO-RTO: 0 /100 WBCS
PLATELET # BLD AUTO: 203 THOUSANDS/UL (ref 149–390)
PLATELET # BLD AUTO: 203 THOUSANDS/UL (ref 149–390)
PMV BLD AUTO: 10.1 FL (ref 8.9–12.7)
PMV BLD AUTO: 10.1 FL (ref 8.9–12.7)
POTASSIUM SERPL-SCNC: 4 MMOL/L (ref 3.5–5.3)
POTASSIUM SERPL-SCNC: 4 MMOL/L (ref 3.5–5.3)
RBC # BLD AUTO: 3.59 MILLION/UL (ref 3.81–5.12)
RBC # BLD AUTO: 3.59 MILLION/UL (ref 3.81–5.12)
SODIUM SERPL-SCNC: 141 MMOL/L (ref 135–147)
SODIUM SERPL-SCNC: 141 MMOL/L (ref 135–147)
WBC # BLD AUTO: 6.01 THOUSAND/UL (ref 4.31–10.16)
WBC # BLD AUTO: 6.01 THOUSAND/UL (ref 4.31–10.16)

## 2025-06-24 PROCEDURE — 99232 SBSQ HOSP IP/OBS MODERATE 35: CPT | Performed by: SURGERY

## 2025-06-24 PROCEDURE — 85025 COMPLETE CBC W/AUTO DIFF WBC: CPT

## 2025-06-24 PROCEDURE — 36415 COLL VENOUS BLD VENIPUNCTURE: CPT

## 2025-06-24 PROCEDURE — 28400 CLTX CALCANEAL FX W/O MNPJ: CPT | Performed by: STUDENT IN AN ORGANIZED HEALTH CARE EDUCATION/TRAINING PROGRAM

## 2025-06-24 PROCEDURE — 97167 OT EVAL HIGH COMPLEX 60 MIN: CPT

## 2025-06-24 PROCEDURE — 97163 PT EVAL HIGH COMPLEX 45 MIN: CPT

## 2025-06-24 PROCEDURE — 80048 BASIC METABOLIC PNL TOTAL CA: CPT

## 2025-06-24 PROCEDURE — 99204 OFFICE O/P NEW MOD 45 MIN: CPT | Performed by: STUDENT IN AN ORGANIZED HEALTH CARE EDUCATION/TRAINING PROGRAM

## 2025-06-24 RX ORDER — DULOXETIN HYDROCHLORIDE 60 MG/1
60 CAPSULE, DELAYED RELEASE ORAL 2 TIMES DAILY
Status: DISCONTINUED | OUTPATIENT
Start: 2025-06-24 | End: 2025-06-25

## 2025-06-24 RX ORDER — DULOXETIN HYDROCHLORIDE 60 MG/1
60 CAPSULE, DELAYED RELEASE ORAL 2 TIMES DAILY
Status: CANCELLED | OUTPATIENT
Start: 2025-06-24

## 2025-06-24 RX ORDER — ENOXAPARIN SODIUM 100 MG/ML
30 INJECTION SUBCUTANEOUS EVERY 12 HOURS SCHEDULED
Status: DISCONTINUED | OUTPATIENT
Start: 2025-06-24 | End: 2025-06-26

## 2025-06-24 RX ORDER — METOPROLOL SUCCINATE 50 MG/1
50 TABLET, EXTENDED RELEASE ORAL DAILY
Status: DISCONTINUED | OUTPATIENT
Start: 2025-06-25 | End: 2025-06-26

## 2025-06-24 RX ADMIN — PRAVASTATIN SODIUM 40 MG: 40 TABLET ORAL at 19:09

## 2025-06-24 RX ADMIN — DULOXETINE 60 MG: 60 CAPSULE, DELAYED RELEASE ORAL at 21:22

## 2025-06-24 RX ADMIN — ENOXAPARIN SODIUM 30 MG: 30 INJECTION SUBCUTANEOUS at 09:09

## 2025-06-24 RX ADMIN — GABAPENTIN 400 MG: 400 CAPSULE ORAL at 19:09

## 2025-06-24 RX ADMIN — Medication 2.5 MG: at 21:22

## 2025-06-24 RX ADMIN — GABAPENTIN 400 MG: 400 CAPSULE ORAL at 09:08

## 2025-06-24 RX ADMIN — ENOXAPARIN SODIUM 30 MG: 30 INJECTION SUBCUTANEOUS at 21:22

## 2025-06-24 RX ADMIN — ACETAMINOPHEN 975 MG: 325 TABLET, FILM COATED ORAL at 06:02

## 2025-06-24 RX ADMIN — GABAPENTIN 400 MG: 400 CAPSULE ORAL at 21:22

## 2025-06-24 RX ADMIN — ACETAMINOPHEN 975 MG: 325 TABLET, FILM COATED ORAL at 21:21

## 2025-06-24 RX ADMIN — ACETAMINOPHEN 975 MG: 325 TABLET, FILM COATED ORAL at 14:49

## 2025-06-24 RX ADMIN — Medication 2.5 MG: at 11:48

## 2025-06-24 RX ADMIN — METOPROLOL SUCCINATE 25 MG: 25 TABLET, EXTENDED RELEASE ORAL at 09:08
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
## 2025-06-24 NOTE — ASSESSMENT & PLAN NOTE
- Hematoma to L forearm, L hand  - Compressive dressing applied  - Frequent neuro checks  - f/u hgb
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
## 2025-06-24 NOTE — DISCHARGE INSTR - AVS FIRST PAGE
Discharge Instructions - Orthopedics  Divine Conde 80 y.o. female MRN: [id number]  Unit/Bed#: AN CT SCAN    Weight Bearing Status:                                           Non weight bearing right lower extremity.     DVT prophylaxis:  *** Per primary team, ok for eliquis.    Pain:  Continue analgesics as directed    Dressing Instructions:   Please keep clean, dry and intact until follow up     Appt Instructions:   If you do not have your appointment, please call the clinic at [phone] to schedule with Dr. Solis in 3 weeks.  Otherwise follow up as scheduled.    Contact the office sooner if you experience any increased numbness/tingling in the extremities.

## 2025-06-24 NOTE — ASSESSMENT & PLAN NOTE
- Xray R ankle: band of lucency and sclerosis vertically oriented along the mid calcaneus suspicious for fracture.   - splinted in the ER  - Appreciate Orthopedic surgery evaluation, recommendations and interventions as noted.  - per ortho call, recommending CT w/o contrast of RLE  - follow up CT results  - Maintain non weightbearing status on the RL extremity.  - Monitor right lower extremity neurovascular exam.  - Continue multimodal analgesic regimen.  - PT and OT evaluation and treatment as indicated.

## 2025-06-24 NOTE — ASSESSMENT & PLAN NOTE
- Xray R ankle: band of lucency and sclerosis vertically oriented along the mid calcaneus suspicious for fracture.   - splinted in the ER  - Appreciate Orthopedic surgery evaluation, recommendations and interventions as noted.  - per ortho call, recommending CT w/o contrast of RLE  - follow up CT results, pending radiology read (6/24)  - Maintain non weightbearing status on the RL extremity.  - Monitor right lower extremity neurovascular exam.  - Continue multimodal analgesic regimen.  - PT and OT evaluation and treatment as indicated.

## 2025-06-24 NOTE — ASSESSMENT & PLAN NOTE
- Superficial lacerations to L hand, R forearm  - Repaired at bedside  - Routine wound care  - Tetanus updated today
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
## 2025-06-24 NOTE — ASSESSMENT & PLAN NOTE
Non weight bearing to the right lower extremity in bulky nguyen splint.   No immediate operative intervention at this time.   Pain control per trauma team.   Ice/elevation.   PT/OT  DVT ppx: suggest dvt ppx in light of non weight bearing status.   Medical management per primary team.   Rest of care per primary  Discussed and reviewed with Dr. Solis.   Follow up as outpatient with Dr. Solis in 3 weeks for re-evaluation.

## 2025-06-24 NOTE — PROCEDURES
POC FAST US    Date/Time: 6/23/2025 10:52 PM    Performed by: Ai Raza MD  Authorized by: Ai Raza MD    Patient location:  Trauma  Procedure details:     Exam Type:  Diagnostic    Indications: blunt abdominal trauma      Assess for:  Intra-abdominal fluid, pericardial effusion and pneumothorax    Technique: FAST      Views obtained:  Heart - Pericardial sac, Suprapubic - Pouch of Robi, RUQ - Seo's Pouch and LUQ - Splenorenal space    Image quality: diagnostic      Image availability:  Images available in PACS  FAST Findings:     RUQ (Hepatorenal) free fluid: absent      LUQ (Splenorenal) free fluid: absent      Suprapubic free fluid: absent      Cardiac wall motion: identified      Pericardial effusion: absent    Interpretation:     Impressions: negative
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
## 2025-06-24 NOTE — PLAN OF CARE
Problem: Prexisting or High Potential for Compromised Skin Integrity  Goal: Skin integrity is maintained or improved  Description: INTERVENTIONS:  - Identify patients at risk for skin breakdown  - Assess and monitor skin integrity including under and around medical devices   - Assess and monitor nutrition and hydration status  - Monitor labs  - Assess for incontinence   - Turn and reposition patient  - Assist with mobility/ambulation  - Relieve pressure over alirio prominences   - Avoid friction and shearing  - Provide appropriate hygiene as needed including keeping skin clean and dry  - Evaluate need for skin moisturizer/barrier cream  - Collaborate with interdisciplinary team  - Patient/family teaching  - Consider wound care consult    Assess:  - Review Asa scale daily  - Clean and moisturize skin every day  - Inspect skin when repositioning, toileting, and assisting with ADLS  - Assess under medical devices such as IV every day  - Assess extremities for adequate circulation and sensation     Bed Management:  - Have minimal linens on bed & keep smooth, unwrinkled  - Change linens as needed when moist or perspiring  - Avoid sitting or lying in one position for more than 2 hours while in bed?Keep HOB at 20 degrees   - Toileting:  - Offer bedside commode  - Assess for incontinence every hour  - Use incontinent care products after each incontinent episode    Activity:  - Mobilize patient 3 times a day  - Encourage activity and walks on unit  - Encourage or provide ROM exercises   - Turn and reposition patient every 2 Hours  - Use appropriate equipment to lift or move patient in bed  - Instruct/ Assist with weight shifting every 2 hours when out of bed in chair  - Consider limitation of chair time 4 hour intervals    Skin Care:  - Avoid use of baby powder, tape, friction and shearing, hot water or constrictive clothing  - Relieve pressure over bony prominences using foam  - Do not massage red bony areas    Next  Steps:  - Consider consults to  interdisciplinary teams such as pt/ot  Outcome: Progressing     Problem: PAIN - ADULT  Goal: Verbalizes/displays adequate comfort level or baseline comfort level  Description: Interventions:  - Encourage patient to monitor pain and request assistance  - Assess pain using appropriate pain scale  - Administer analgesics as ordered based on type and severity of pain and evaluate response  - Implement non-pharmacological measures as appropriate and evaluate response  - Consider cultural and social influences on pain and pain management  - Notify physician/advanced practitioner if interventions unsuccessful or patient reports new pain  - Educate patient/family on pain management process including their role and importance of  reporting pain   - Provide non-pharmacologic/complimentary pain relief interventions  Outcome: Progressing

## 2025-06-25 PROBLEM — G62.9 PERIPHERAL NEUROPATHY: Status: ACTIVE | Noted: 2025-06-25

## 2025-06-25 PROBLEM — G89.29 CHRONIC LOWER BACK PAIN: Status: ACTIVE | Noted: 2025-06-25

## 2025-06-25 PROBLEM — E78.5 HLD (HYPERLIPIDEMIA): Status: ACTIVE | Noted: 2025-06-25

## 2025-06-25 PROBLEM — R41.89 ALTERATION IN COGNITION: Status: ACTIVE | Noted: 2025-06-25

## 2025-06-25 PROBLEM — M81.0 OSTEOPOROSIS: Status: ACTIVE | Noted: 2025-06-25

## 2025-06-25 PROBLEM — I10 HYPERTENSION: Status: ACTIVE | Noted: 2025-06-25

## 2025-06-25 PROBLEM — M54.50 CHRONIC LOWER BACK PAIN: Status: ACTIVE | Noted: 2025-06-25

## 2025-06-25 PROBLEM — F32.A DEPRESSION: Status: ACTIVE | Noted: 2025-06-25

## 2025-06-25 PROBLEM — I48.91 A-FIB (HCC): Status: ACTIVE | Noted: 2025-06-25

## 2025-06-25 LAB
ANION GAP SERPL CALCULATED.3IONS-SCNC: 4 MMOL/L (ref 4–13)
ANION GAP SERPL CALCULATED.3IONS-SCNC: 4 MMOL/L (ref 4–13)
BASOPHILS # BLD AUTO: 0.03 THOUSANDS/ÂΜL (ref 0–0.1)
BASOPHILS # BLD AUTO: 0.03 THOUSANDS/ÂΜL (ref 0–0.1)
BASOPHILS NFR BLD AUTO: 1 % (ref 0–1)
BASOPHILS NFR BLD AUTO: 1 % (ref 0–1)
BUN SERPL-MCNC: 22 MG/DL (ref 5–25)
BUN SERPL-MCNC: 22 MG/DL (ref 5–25)
CALCIUM SERPL-MCNC: 8.6 MG/DL (ref 8.4–10.2)
CALCIUM SERPL-MCNC: 8.6 MG/DL (ref 8.4–10.2)
CHLORIDE SERPL-SCNC: 109 MMOL/L (ref 96–108)
CHLORIDE SERPL-SCNC: 109 MMOL/L (ref 96–108)
CO2 SERPL-SCNC: 26 MMOL/L (ref 21–32)
CO2 SERPL-SCNC: 26 MMOL/L (ref 21–32)
CREAT SERPL-MCNC: 0.96 MG/DL (ref 0.6–1.3)
CREAT SERPL-MCNC: 0.96 MG/DL (ref 0.6–1.3)
EOSINOPHIL # BLD AUTO: 0.34 THOUSAND/ÂΜL (ref 0–0.61)
EOSINOPHIL # BLD AUTO: 0.34 THOUSAND/ÂΜL (ref 0–0.61)
EOSINOPHIL NFR BLD AUTO: 5 % (ref 0–6)
EOSINOPHIL NFR BLD AUTO: 5 % (ref 0–6)
ERYTHROCYTE [DISTWIDTH] IN BLOOD BY AUTOMATED COUNT: 14 % (ref 11.6–15.1)
ERYTHROCYTE [DISTWIDTH] IN BLOOD BY AUTOMATED COUNT: 14 % (ref 11.6–15.1)
GFR SERPL CREATININE-BSD FRML MDRD: 55 ML/MIN/1.73SQ M
GFR SERPL CREATININE-BSD FRML MDRD: 55 ML/MIN/1.73SQ M
GLUCOSE SERPL-MCNC: 110 MG/DL (ref 65–140)
GLUCOSE SERPL-MCNC: 110 MG/DL (ref 65–140)
HCT VFR BLD AUTO: 31.9 % (ref 34.8–46.1)
HCT VFR BLD AUTO: 31.9 % (ref 34.8–46.1)
HGB BLD-MCNC: 10.4 G/DL (ref 11.5–15.4)
HGB BLD-MCNC: 10.4 G/DL (ref 11.5–15.4)
IMM GRANULOCYTES # BLD AUTO: 0.02 THOUSAND/UL (ref 0–0.2)
IMM GRANULOCYTES # BLD AUTO: 0.02 THOUSAND/UL (ref 0–0.2)
IMM GRANULOCYTES NFR BLD AUTO: 0 % (ref 0–2)
IMM GRANULOCYTES NFR BLD AUTO: 0 % (ref 0–2)
LYMPHOCYTES # BLD AUTO: 1.5 THOUSANDS/ÂΜL (ref 0.6–4.47)
LYMPHOCYTES # BLD AUTO: 1.5 THOUSANDS/ÂΜL (ref 0.6–4.47)
LYMPHOCYTES NFR BLD AUTO: 24 % (ref 14–44)
LYMPHOCYTES NFR BLD AUTO: 24 % (ref 14–44)
MAGNESIUM SERPL-MCNC: 1.4 MG/DL (ref 1.9–2.7)
MAGNESIUM SERPL-MCNC: 1.4 MG/DL (ref 1.9–2.7)
MCH RBC QN AUTO: 31.1 PG (ref 26.8–34.3)
MCH RBC QN AUTO: 31.1 PG (ref 26.8–34.3)
MCHC RBC AUTO-ENTMCNC: 32.6 G/DL (ref 31.4–37.4)
MCHC RBC AUTO-ENTMCNC: 32.6 G/DL (ref 31.4–37.4)
MCV RBC AUTO: 96 FL (ref 82–98)
MCV RBC AUTO: 96 FL (ref 82–98)
MONOCYTES # BLD AUTO: 0.55 THOUSAND/ÂΜL (ref 0.17–1.22)
MONOCYTES # BLD AUTO: 0.55 THOUSAND/ÂΜL (ref 0.17–1.22)
MONOCYTES NFR BLD AUTO: 9 % (ref 4–12)
MONOCYTES NFR BLD AUTO: 9 % (ref 4–12)
NEUTROPHILS # BLD AUTO: 3.93 THOUSANDS/ÂΜL (ref 1.85–7.62)
NEUTROPHILS # BLD AUTO: 3.93 THOUSANDS/ÂΜL (ref 1.85–7.62)
NEUTS SEG NFR BLD AUTO: 61 % (ref 43–75)
NEUTS SEG NFR BLD AUTO: 61 % (ref 43–75)
NRBC BLD AUTO-RTO: 0 /100 WBCS
NRBC BLD AUTO-RTO: 0 /100 WBCS
PHOSPHATE SERPL-MCNC: 3 MG/DL (ref 2.3–4.1)
PHOSPHATE SERPL-MCNC: 3 MG/DL (ref 2.3–4.1)
PLATELET # BLD AUTO: 186 THOUSANDS/UL (ref 149–390)
PLATELET # BLD AUTO: 186 THOUSANDS/UL (ref 149–390)
PMV BLD AUTO: 10 FL (ref 8.9–12.7)
PMV BLD AUTO: 10 FL (ref 8.9–12.7)
POTASSIUM SERPL-SCNC: 3.8 MMOL/L (ref 3.5–5.3)
POTASSIUM SERPL-SCNC: 3.8 MMOL/L (ref 3.5–5.3)
RBC # BLD AUTO: 3.34 MILLION/UL (ref 3.81–5.12)
RBC # BLD AUTO: 3.34 MILLION/UL (ref 3.81–5.12)
SODIUM SERPL-SCNC: 139 MMOL/L (ref 135–147)
SODIUM SERPL-SCNC: 139 MMOL/L (ref 135–147)
T4 FREE SERPL-MCNC: 0.98 NG/DL (ref 0.61–1.12)
T4 FREE SERPL-MCNC: 0.98 NG/DL (ref 0.61–1.12)
TSH SERPL DL<=0.05 MIU/L-ACNC: 5.34 UIU/ML (ref 0.45–4.5)
TSH SERPL DL<=0.05 MIU/L-ACNC: 5.34 UIU/ML (ref 0.45–4.5)
VIT B12 SERPL-MCNC: 334 PG/ML (ref 180–914)
VIT B12 SERPL-MCNC: 334 PG/ML (ref 180–914)
WBC # BLD AUTO: 6.37 THOUSAND/UL (ref 4.31–10.16)
WBC # BLD AUTO: 6.37 THOUSAND/UL (ref 4.31–10.16)

## 2025-06-25 PROCEDURE — 84100 ASSAY OF PHOSPHORUS: CPT | Performed by: EMERGENCY MEDICINE

## 2025-06-25 PROCEDURE — 97530 THERAPEUTIC ACTIVITIES: CPT

## 2025-06-25 PROCEDURE — 99223 1ST HOSP IP/OBS HIGH 75: CPT | Performed by: FAMILY MEDICINE

## 2025-06-25 PROCEDURE — 83735 ASSAY OF MAGNESIUM: CPT | Performed by: EMERGENCY MEDICINE

## 2025-06-25 PROCEDURE — 97110 THERAPEUTIC EXERCISES: CPT

## 2025-06-25 PROCEDURE — 82607 VITAMIN B-12: CPT | Performed by: FAMILY MEDICINE

## 2025-06-25 PROCEDURE — 84443 ASSAY THYROID STIM HORMONE: CPT | Performed by: FAMILY MEDICINE

## 2025-06-25 PROCEDURE — 99232 SBSQ HOSP IP/OBS MODERATE 35: CPT | Performed by: SURGERY

## 2025-06-25 PROCEDURE — 80048 BASIC METABOLIC PNL TOTAL CA: CPT | Performed by: EMERGENCY MEDICINE

## 2025-06-25 PROCEDURE — 84439 ASSAY OF FREE THYROXINE: CPT | Performed by: FAMILY MEDICINE

## 2025-06-25 PROCEDURE — 85025 COMPLETE CBC W/AUTO DIFF WBC: CPT | Performed by: EMERGENCY MEDICINE

## 2025-06-25 RX ORDER — DULOXETIN HYDROCHLORIDE 60 MG/1
60 CAPSULE, DELAYED RELEASE ORAL DAILY
Status: DISCONTINUED | OUTPATIENT
Start: 2025-06-26 | End: 2025-06-28 | Stop reason: HOSPADM

## 2025-06-25 RX ORDER — POLYETHYLENE GLYCOL 3350 17 G/17G
17 POWDER, FOR SOLUTION ORAL DAILY PRN
Status: DISCONTINUED | OUTPATIENT
Start: 2025-06-25 | End: 2025-06-28 | Stop reason: HOSPADM

## 2025-06-25 RX ORDER — MAGNESIUM SULFATE HEPTAHYDRATE 40 MG/ML
4 INJECTION, SOLUTION INTRAVENOUS ONCE
Status: COMPLETED | OUTPATIENT
Start: 2025-06-25 | End: 2025-06-25

## 2025-06-25 RX ORDER — SENNOSIDES 8.6 MG
1 TABLET ORAL
Status: DISCONTINUED | OUTPATIENT
Start: 2025-06-25 | End: 2025-06-25

## 2025-06-25 RX ORDER — POLYETHYLENE GLYCOL 3350 17 G/17G
17 POWDER, FOR SOLUTION ORAL DAILY
Status: DISCONTINUED | OUTPATIENT
Start: 2025-06-25 | End: 2025-06-25

## 2025-06-25 RX ORDER — GABAPENTIN 400 MG/1
400 CAPSULE ORAL 2 TIMES DAILY
Status: DISCONTINUED | OUTPATIENT
Start: 2025-06-26 | End: 2025-06-26

## 2025-06-25 RX ADMIN — ENOXAPARIN SODIUM 30 MG: 30 INJECTION SUBCUTANEOUS at 09:10

## 2025-06-25 RX ADMIN — METOPROLOL SUCCINATE 50 MG: 50 TABLET, EXTENDED RELEASE ORAL at 09:10

## 2025-06-25 RX ADMIN — ENOXAPARIN SODIUM 30 MG: 30 INJECTION SUBCUTANEOUS at 22:17

## 2025-06-25 RX ADMIN — PRAVASTATIN SODIUM 40 MG: 40 TABLET ORAL at 17:45

## 2025-06-25 RX ADMIN — DULOXETINE 60 MG: 60 CAPSULE, DELAYED RELEASE ORAL at 09:10

## 2025-06-25 RX ADMIN — ACETAMINOPHEN 975 MG: 325 TABLET, FILM COATED ORAL at 05:04

## 2025-06-25 RX ADMIN — ACETAMINOPHEN 975 MG: 325 TABLET, FILM COATED ORAL at 14:20

## 2025-06-25 RX ADMIN — ACETAMINOPHEN 975 MG: 325 TABLET, FILM COATED ORAL at 22:17

## 2025-06-25 RX ADMIN — GABAPENTIN 400 MG: 400 CAPSULE ORAL at 09:10

## 2025-06-25 RX ADMIN — MAGNESIUM SULFATE HEPTAHYDRATE 4 G: 40 INJECTION, SOLUTION INTRAVENOUS at 10:54
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
## 2025-06-25 NOTE — PHYSICAL THERAPY NOTE
PHYSICAL THERAPY NOTE          Patient Name: Divine Conde  Today's Date: 25 1356   PT Last Visit   PT Visit Date 25   Note Type   Note Type Treatment   Pain Assessment   Pain Assessment Tool 0-10   Pain Score No Pain   Patient's Stated Pain Goal No pain   Hospital Pain Intervention(s) Repositioned;Ambulation/increased activity;Elevated;Rest   Multiple Pain Sites No   Restrictions/Precautions   Weight Bearing Precautions Per Order Yes   RLE Weight Bearing Per Order (S)  NWB   Braces or Orthoses Splint  (bulky nguyen splint)   Other Precautions Chair Alarm;Cognitive;Bed Alarm;WBS;Fall Risk;Pain   General   Chart Reviewed Yes   Response to Previous Treatment Patient with no complaints from previous session.   Family/Caregiver Present No   Cognition   Overall Cognitive Status WFL   Arousal/Participation Alert;Responsive;Cooperative   Attention Attends with cues to redirect   Orientation Level Oriented X4   Memory Decreased recall of precautions   Following Commands Follows one step commands without difficulty   Comments pt ID by name and . pt was agreeable to participate in PT tx session. pt reports no pain pre/post tx session   Subjective   Subjective pt was agreeable to participate in PT tx session. pt reports no pain pre/post tx session   Bed Mobility   Supine to Sit 5  Supervision   Additional items Assist x 1;HOB elevated;Bedrails;Increased time required;Verbal cues   Sit to Supine Unable to assess   Additional Comments pt seated OOB in the recliner post tx w/ call bell, chair alarm activated, legs elevated and all pt needs met   Transfers   Sit to Stand 2  Maximal assistance   Additional items Assist x 1;HOB elevated;Bedrails;Increased time required;Verbal cues   Stand to Sit 2  Maximal assistance   Additional items Assist x 1;HOB elevated;Bedrails;Increased time required;Verbal cues   Stand pivot 2   Maximal assistance   Additional items Assist x 1;HOB elevated;Bedrails;Increased time required;Verbal cues   Additional Comments pt continues to require max Ax1 and RW for all functional transfers. pt at this time is unable to maintain her NWB precautions and required assistance for RLE management during transfers   Ambulation/Elevation   Gait pattern Not appropriate;Not tested   Balance   Static Sitting Fair   Dynamic Sitting Fair -   Static Standing Poor -   Dynamic Standing Poor -   Ambulatory Zero   Endurance Deficit   Endurance Deficit Yes   Endurance Deficit Description limited functional mobility, standing tolerance and ambulation at this time   Activity Tolerance   Activity Tolerance Patient tolerated treatment well   Nurse Made Aware Spoke to RN   Exercises   Quad Sets Sitting;10 reps;AROM;Bilateral  (long sit position)   Hip Abduction Sitting;10 reps;AROM;Bilateral   Hip Adduction Sitting;10 reps;AROM;Bilateral  (long sit position)   Knee AROM Short Arc Quad Sitting;10 reps;AROM;Bilateral  (long sit position)   Knee AROM Long Arc Quad Sitting;10 reps;AROM;Bilateral   Ankle Pumps Sitting;20 reps;AROM;Left   Marching Sitting;10 reps;AROM;Bilateral   Assessment   Problem List Decreased strength;Decreased endurance;Impaired balance;Decreased mobility;Orthopedic restrictions   Assessment pt began tx session lying supine in the bed as pt was pleasant and agreeable to participate in PT tx session. PT to focus on bed mobility, transfer training, maintaining pt NWB precautions and increasing pt safety with OOB activities. In comparison to previous tx sessions pt continues to demonstrate the ability to complete a supine<>sit EOB transfer with /s. pt was able to sit EOB with /s while being educated on her NWB precauions and functional transfers with RW. pt continues to be limited with functional mobility and functional transfers as pt required max Ax1 for all STS and SPT w/ RW in todays tx session. pt at this time is  unable to maintain her NWB precautions of her RLE and required physical assistance for maintaining her NWB precautions during all functional transfers. pt did participate in TE activities with AROM, good form throughout and no increases in pain. At this time pt continues to be functioning below her baseline level and remains at an increased risk for falls and injuries with OOB activities. pt would benefit form continued skilled pT interventions in order to address deficts listed above. Continue to recommend DC w/ level 2 moderate rehab resource intensity when medically cleared   Barriers to Discharge Inaccessible home environment;Decreased caregiver support;Other (Comment)  ([facility] home)   Goals   Patient Goals to get better   STG Expiration Date 07/04/25   PT Treatment Day 1   Plan   Treatment/Interventions Functional transfer training;LE strengthening/ROM;Therapeutic exercise;Endurance training;Patient/family training;Equipment eval/education;Bed mobility;Gait training;Spoke to nursing;Compensatory technique education  (WC training)   Progress No functional improvements   PT Frequency 3-5x/wk   Discharge Recommendation   Rehab Resource Intensity Level, PT II (Moderate Resource Intensity)   Equipment Recommended Walker   Walker Package Recommended Wheeled walker   Change/add to Walker Package? No   AM-PAC Basic Mobility Inpatient   Turning in Flat Bed Without Bedrails 3   Lying on Back to Sitting on Edge of Flat Bed Without Bedrails 3   Moving Bed to Chair 2   Standing Up From Chair Using Arms 2   Walk in Room 1   Climb 3-5 Stairs With Railing 1   Basic Mobility Inpatient Raw Score 12   Basic Mobility Standardized Score 32.23   [facility] Highest Level Of Mobility   -[facility] Goal 4: Move to chair/commode   -[facility] Achieved 4: Move to chair/commode   Education   Education Provided Other  (bed mobility, transfer training and TE activities)   Patient Reinforcement needed   End of Consult   Patient Position at End of  Consult Bedside chair;Bed/Chair alarm activated;All needs within reach   The patient's AM-PAC Basic Mobility Inpatient Short Form Raw Score is 12. A Raw score of less than or equal to 16 suggests the patient may benefit from discharge to post-acute rehabilitation services. Please also refer to the recommendation of the Physical Therapist for safe discharge planning.    Jm Villarreal
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
## 2025-06-25 NOTE — PLAN OF CARE
Problem: PHYSICAL THERAPY ADULT  Goal: Performs mobility at highest level of function for planned discharge setting.  See evaluation for individualized goals.  Description: Treatment/Interventions: Functional transfer training, LE strengthening/ROM, Therapeutic exercise, Endurance training, Patient/family training, Equipment eval/education, Bed mobility, Gait training, Compensatory technique education (WC training)  Equipment Recommended: Walker       See flowsheet documentation for full assessment, interventions and recommendations.  Outcome: Not Progressing  Note:    Problem List: Decreased strength, Decreased endurance, Impaired balance, Decreased mobility, Orthopedic restrictions  Assessment: pt began tx session lying supine in the bed as pt was pleasant and agreeable to participate in PT tx session. PT to focus on bed mobility, transfer training, maintaining pt NWB precautions and increasing pt safety with OOB activities. In comparison to previous tx sessions pt continues to demonstrate the ability to complete a supine<>sit EOB transfer with /s. pt was able to sit EOB with /s while being educated on her NWB precauions and functional transfers with RW. pt continues to be limited with functional mobility and functional transfers as pt required max Ax1 for all STS and SPT w/ RW in todays tx session. pt at this time is unable to maintain her NWB precautions of her RLE and required physical assistance for maintaining her NWB precautions during all functional transfers. pt did participate in TE activities with AROM, good form throughout and no increases in pain. At this time pt continues to be functioning below her baseline level and remains at an increased risk for falls and injuries with OOB activities. pt would benefit form continued skilled pT interventions in order to address deficts listed above. Continue to recommend DC w/ level 2 moderate rehab resource intensity when medically cleared  Barriers to  Discharge: Inaccessible home environment, Decreased caregiver support, Other (Comment) ([facility] home)     Rehab Resource Intensity Level, PT: II (Moderate Resource Intensity)    See flowsheet documentation for full assessment.
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
## 2025-06-25 NOTE — ASSESSMENT & PLAN NOTE
With history of cervical and lumbar spin stenosis and vertebral osteomyelitis   Maintained on buprenorphine patch 5 mcg/h q 7days, due to change on Thursdays  Will hold at this time as pain regimen has been ordered here  Monitor symptoms
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
## 2025-06-25 NOTE — QUICK NOTE
Home medication reviewed with the patient, pharmacy online    Cymbalta 60 mg twice a day  Gabapentin 400 mg TID  BuSpar 10 mg twice daily  Metoprolol 25 mg daily  Simvastatin 20 mg daily  Boniva 150 mg every month  Buprenorphine 5 mcg/h q 7 days, due to change on Thursdays  Eliquis 2.5 mg twice daily  Estradiol vaginal cream apply 2-3 times a week  Trospium 20 mg BID
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
## 2025-06-26 LAB
ANION GAP SERPL CALCULATED.3IONS-SCNC: 6 MMOL/L (ref 4–13)
ANION GAP SERPL CALCULATED.3IONS-SCNC: 6 MMOL/L (ref 4–13)
BASOPHILS # BLD AUTO: 0.03 THOUSANDS/ÂΜL (ref 0–0.1)
BASOPHILS # BLD AUTO: 0.03 THOUSANDS/ÂΜL (ref 0–0.1)
BASOPHILS NFR BLD AUTO: 1 % (ref 0–1)
BASOPHILS NFR BLD AUTO: 1 % (ref 0–1)
BUN SERPL-MCNC: 19 MG/DL (ref 5–25)
BUN SERPL-MCNC: 19 MG/DL (ref 5–25)
CALCIUM SERPL-MCNC: 8.7 MG/DL (ref 8.4–10.2)
CALCIUM SERPL-MCNC: 8.7 MG/DL (ref 8.4–10.2)
CHLORIDE SERPL-SCNC: 105 MMOL/L (ref 96–108)
CHLORIDE SERPL-SCNC: 105 MMOL/L (ref 96–108)
CO2 SERPL-SCNC: 26 MMOL/L (ref 21–32)
CO2 SERPL-SCNC: 26 MMOL/L (ref 21–32)
CREAT SERPL-MCNC: 1.14 MG/DL (ref 0.6–1.3)
CREAT SERPL-MCNC: 1.14 MG/DL (ref 0.6–1.3)
EOSINOPHIL # BLD AUTO: 0.48 THOUSAND/ÂΜL (ref 0–0.61)
EOSINOPHIL # BLD AUTO: 0.48 THOUSAND/ÂΜL (ref 0–0.61)
EOSINOPHIL NFR BLD AUTO: 7 % (ref 0–6)
EOSINOPHIL NFR BLD AUTO: 7 % (ref 0–6)
ERYTHROCYTE [DISTWIDTH] IN BLOOD BY AUTOMATED COUNT: 14.3 % (ref 11.6–15.1)
ERYTHROCYTE [DISTWIDTH] IN BLOOD BY AUTOMATED COUNT: 14.3 % (ref 11.6–15.1)
GFR SERPL CREATININE-BSD FRML MDRD: 45 ML/MIN/1.73SQ M
GFR SERPL CREATININE-BSD FRML MDRD: 45 ML/MIN/1.73SQ M
GLUCOSE SERPL-MCNC: 122 MG/DL (ref 65–140)
GLUCOSE SERPL-MCNC: 122 MG/DL (ref 65–140)
HCT VFR BLD AUTO: 33.4 % (ref 34.8–46.1)
HCT VFR BLD AUTO: 33.4 % (ref 34.8–46.1)
HGB BLD-MCNC: 10.8 G/DL (ref 11.5–15.4)
HGB BLD-MCNC: 10.8 G/DL (ref 11.5–15.4)
IMM GRANULOCYTES # BLD AUTO: 0.02 THOUSAND/UL (ref 0–0.2)
IMM GRANULOCYTES # BLD AUTO: 0.02 THOUSAND/UL (ref 0–0.2)
IMM GRANULOCYTES NFR BLD AUTO: 0 % (ref 0–2)
IMM GRANULOCYTES NFR BLD AUTO: 0 % (ref 0–2)
LYMPHOCYTES # BLD AUTO: 1.59 THOUSANDS/ÂΜL (ref 0.6–4.47)
LYMPHOCYTES # BLD AUTO: 1.59 THOUSANDS/ÂΜL (ref 0.6–4.47)
LYMPHOCYTES NFR BLD AUTO: 24 % (ref 14–44)
LYMPHOCYTES NFR BLD AUTO: 24 % (ref 14–44)
MAGNESIUM SERPL-MCNC: 2.4 MG/DL (ref 1.9–2.7)
MAGNESIUM SERPL-MCNC: 2.4 MG/DL (ref 1.9–2.7)
MCH RBC QN AUTO: 31.3 PG (ref 26.8–34.3)
MCH RBC QN AUTO: 31.3 PG (ref 26.8–34.3)
MCHC RBC AUTO-ENTMCNC: 32.3 G/DL (ref 31.4–37.4)
MCHC RBC AUTO-ENTMCNC: 32.3 G/DL (ref 31.4–37.4)
MCV RBC AUTO: 97 FL (ref 82–98)
MCV RBC AUTO: 97 FL (ref 82–98)
MONOCYTES # BLD AUTO: 0.55 THOUSAND/ÂΜL (ref 0.17–1.22)
MONOCYTES # BLD AUTO: 0.55 THOUSAND/ÂΜL (ref 0.17–1.22)
MONOCYTES NFR BLD AUTO: 8 % (ref 4–12)
MONOCYTES NFR BLD AUTO: 8 % (ref 4–12)
NEUTROPHILS # BLD AUTO: 3.98 THOUSANDS/ÂΜL (ref 1.85–7.62)
NEUTROPHILS # BLD AUTO: 3.98 THOUSANDS/ÂΜL (ref 1.85–7.62)
NEUTS SEG NFR BLD AUTO: 60 % (ref 43–75)
NEUTS SEG NFR BLD AUTO: 60 % (ref 43–75)
NRBC BLD AUTO-RTO: 0 /100 WBCS
NRBC BLD AUTO-RTO: 0 /100 WBCS
PHOSPHATE SERPL-MCNC: 2.5 MG/DL (ref 2.3–4.1)
PHOSPHATE SERPL-MCNC: 2.5 MG/DL (ref 2.3–4.1)
PLATELET # BLD AUTO: 214 THOUSANDS/UL (ref 149–390)
PLATELET # BLD AUTO: 214 THOUSANDS/UL (ref 149–390)
PMV BLD AUTO: 9.7 FL (ref 8.9–12.7)
PMV BLD AUTO: 9.7 FL (ref 8.9–12.7)
POTASSIUM SERPL-SCNC: 4.7 MMOL/L (ref 3.5–5.3)
POTASSIUM SERPL-SCNC: 4.7 MMOL/L (ref 3.5–5.3)
RBC # BLD AUTO: 3.45 MILLION/UL (ref 3.81–5.12)
RBC # BLD AUTO: 3.45 MILLION/UL (ref 3.81–5.12)
SODIUM SERPL-SCNC: 137 MMOL/L (ref 135–147)
SODIUM SERPL-SCNC: 137 MMOL/L (ref 135–147)
WBC # BLD AUTO: 6.65 THOUSAND/UL (ref 4.31–10.16)
WBC # BLD AUTO: 6.65 THOUSAND/UL (ref 4.31–10.16)

## 2025-06-26 PROCEDURE — 84100 ASSAY OF PHOSPHORUS: CPT | Performed by: SURGERY

## 2025-06-26 PROCEDURE — 99233 SBSQ HOSP IP/OBS HIGH 50: CPT | Performed by: FAMILY MEDICINE

## 2025-06-26 PROCEDURE — 83735 ASSAY OF MAGNESIUM: CPT | Performed by: SURGERY

## 2025-06-26 PROCEDURE — 85025 COMPLETE CBC W/AUTO DIFF WBC: CPT | Performed by: SURGERY

## 2025-06-26 PROCEDURE — 99232 SBSQ HOSP IP/OBS MODERATE 35: CPT | Performed by: SURGERY

## 2025-06-26 PROCEDURE — 80048 BASIC METABOLIC PNL TOTAL CA: CPT | Performed by: SURGERY

## 2025-06-26 RX ORDER — ONDANSETRON 2 MG/ML
4 INJECTION INTRAMUSCULAR; INTRAVENOUS EVERY 4 HOURS PRN
Status: DISCONTINUED | OUTPATIENT
Start: 2025-06-26 | End: 2025-06-28 | Stop reason: HOSPADM

## 2025-06-26 RX ORDER — METOPROLOL SUCCINATE 25 MG/1
25 TABLET, EXTENDED RELEASE ORAL DAILY
Status: DISCONTINUED | OUTPATIENT
Start: 2025-06-26 | End: 2025-06-28 | Stop reason: HOSPADM

## 2025-06-26 RX ORDER — GABAPENTIN 400 MG/1
400 CAPSULE ORAL 3 TIMES DAILY
Status: DISCONTINUED | OUTPATIENT
Start: 2025-06-26 | End: 2025-06-26

## 2025-06-26 RX ORDER — GABAPENTIN 400 MG/1
400 CAPSULE ORAL 2 TIMES DAILY
Status: DISCONTINUED | OUTPATIENT
Start: 2025-06-26 | End: 2025-06-28 | Stop reason: HOSPADM

## 2025-06-26 RX ADMIN — PRAVASTATIN SODIUM 40 MG: 40 TABLET ORAL at 16:20

## 2025-06-26 RX ADMIN — APIXABAN 2.5 MG: 2.5 TABLET, FILM COATED ORAL at 09:23

## 2025-06-26 RX ADMIN — DULOXETINE 60 MG: 60 CAPSULE, DELAYED RELEASE ORAL at 09:23

## 2025-06-26 RX ADMIN — ACETAMINOPHEN 975 MG: 325 TABLET, FILM COATED ORAL at 23:34

## 2025-06-26 RX ADMIN — METOPROLOL SUCCINATE 25 MG: 25 TABLET, EXTENDED RELEASE ORAL at 09:23

## 2025-06-26 RX ADMIN — ACETAMINOPHEN 975 MG: 325 TABLET, FILM COATED ORAL at 14:20

## 2025-06-26 RX ADMIN — APIXABAN 2.5 MG: 2.5 TABLET, FILM COATED ORAL at 16:20

## 2025-06-26 RX ADMIN — GABAPENTIN 400 MG: 400 CAPSULE ORAL at 09:23

## 2025-06-26 RX ADMIN — ONDANSETRON 4 MG: 2 INJECTION INTRAMUSCULAR; INTRAVENOUS at 10:36

## 2025-06-26 RX ADMIN — GABAPENTIN 400 MG: 400 CAPSULE ORAL at 16:20

## 2025-06-26 NOTE — ASSESSMENT & PLAN NOTE
Sustained to left forearm and hand  Dressing applied  Recent Labs     06/24/25  0606 06/25/25  0435 06/26/25  0224   HGB 11.3* 10.4* 10.8*     monitor
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
## 2025-06-26 NOTE — ASSESSMENT & PLAN NOTE
- Hematoma to L forearm, L hand  - Compressive dressing applied  - Frequent neuro checks  - 6/25 Hgb stable at 12.5. Hematomas are all soft.

## 2025-06-26 NOTE — ASSESSMENT & PLAN NOTE
- Superficial lacerations to L hand, R forearm  - Repaired at bedside  - Routine wound care  - Tetanus updated

## 2025-06-26 NOTE — ASSESSMENT & PLAN NOTE
With history of cervical and lumbar spin stenosis and vertebral osteomyelitis   Maintained on buprenorphine patch 5 mcg/h q 7days, due to change on Thursdays  Manage per primary team

## 2025-06-26 NOTE — ASSESSMENT & PLAN NOTE
- prescribed buprenorphine 5 mcg patch weekly, reports she changes the patch on Friday  - continue multi modal regimen of oxycodone 2.5/5 mg, scheduled Tylenol and home gabapentin 400 mg TID
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
## 2025-06-26 NOTE — ASSESSMENT & PLAN NOTE
- Xray R ankle: band of lucency and sclerosis vertically oriented along the mid calcaneus suspicious for fracture.   - splinted in the ER  - Appreciate Orthopedic surgery evaluation, recommendations and interventions as noted.  - Non-operative management at this time.   - Maintain non weightbearing status on the RL extremity in splint.  - Monitor right lower extremity neurovascular exam.  - Continue multimodal analgesic regimen.  - DVT ppx with lovenox  - PT and OT evaluation and treatment as indicated.  - Outpatient follow up with orthopedic surgery for reevaluation.
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
## 2025-06-26 NOTE — PLAN OF CARE
Problem: Prexisting or High Potential for Compromised Skin Integrity  Goal: Skin integrity is maintained or improved  Description: INTERVENTIONS:  - Identify patients at risk for skin breakdown  - Assess and monitor skin integrity including under and around medical devices   - Assess and monitor nutrition and hydration status  - Monitor labs  - Assess for incontinence   - Turn and reposition patient  - Assist with mobility/ambulation  - Relieve pressure over alirio prominences   - Avoid friction and shearing  - Provide appropriate hygiene as needed including keeping skin clean and dry  - Evaluate need for skin moisturizer/barrier cream  - Collaborate with interdisciplinary team  - Patient/family teaching  - Consider wound care consult    Assess:  - Review Asa scale daily  - Clean and moisturize skin every day  - Inspect skin when repositioning, toileting, and assisting with ADLS  - Assess under medical devices such as IV every day  - Assess extremities for adequate circulation and sensation     Bed Management:  - Have minimal linens on bed & keep smooth, unwrinkled  - Change linens as needed when moist or perspiring  - Avoid sitting or lying in one position for more than 2 hours while in bed?Keep HOB at 20 degrees   - Toileting:  - Offer bedside commode  - Assess for incontinence every hour  - Use incontinent care products after each incontinent episode    Activity:  - Mobilize patient 3 times a day  - Encourage activity and walks on unit  - Encourage or provide ROM exercises   - Turn and reposition patient every 2 Hours  - Use appropriate equipment to lift or move patient in bed  - Instruct/ Assist with weight shifting every 2 hours when out of bed in chair  - Consider limitation of chair time 4 hour intervals    Skin Care:  - Avoid use of baby powder, tape, friction and shearing, hot water or constrictive clothing  - Relieve pressure over bony prominences using foam  - Do not massage red bony areas    Next  Steps:  - Consider consults to  interdisciplinary teams such as pt/ot  Outcome: Progressing     Problem: PAIN - ADULT  Goal: Verbalizes/displays adequate comfort level or baseline comfort level  Description: Interventions:  - Encourage patient to monitor pain and request assistance  - Assess pain using appropriate pain scale  - Administer analgesics as ordered based on type and severity of pain and evaluate response  - Implement non-pharmacological measures as appropriate and evaluate response  - Consider cultural and social influences on pain and pain management  - Notify physician/advanced practitioner if interventions unsuccessful or patient reports new pain  - Educate patient/family on pain management process including their role and importance of  reporting pain   - Provide non-pharmacologic/complimentary pain relief interventions  Outcome: Progressing     Problem: INFECTION - ADULT  Goal: Absence or prevention of progression during hospitalization  Description: INTERVENTIONS:  - Assess and monitor for signs and symptoms of infection  - Monitor lab/diagnostic results  - Monitor all insertion sites, i.e. indwelling lines, tubes, and drains  - Monitor endotracheal if appropriate and nasal secretions for changes in amount and color  - [place] appropriate cooling/warming therapies per order  - Administer medications as ordered  - Instruct and encourage patient and family to use good hand hygiene technique  - Identify and instruct in appropriate isolation precautions for identified infection/condition  Outcome: Progressing  Goal: Absence of fever/infection during neutropenic period  Description: INTERVENTIONS:  - Monitor WBC  - Perform strict hand hygiene  - Limit to healthy visitors only  - No plants, dried, fresh or silk flowers with miller in patient room  Outcome: Progressing     Problem: SAFETY ADULT  Goal: Patient will remain free of falls  Description: INTERVENTIONS:  - Educate patient/family on patient  safety including physical limitations  - Instruct patient to call for assistance with activity   - Consider consulting OT/PT to assist with strengthening/mobility based on AM PAC & JH-HLM score  - Consult OT/PT to assist with strengthening/mobility   - Keep Call bell within reach  - Keep bed low and locked with side rails adjusted as appropriate  - Keep care items and personal belongings within reach  - Initiate and maintain comfort rounds  - Make Fall Risk Sign visible to staff  - Offer Toileting every 2 Hours, in advance of need  - Initiate/Maintain bed/chair alarm  - Obtain necessary fall risk management equipment:   - Apply yellow socks and bracelet for high fall risk patients  - Consider moving patient to room near nurses station  Outcome: Progressing  Goal: Maintain or return to baseline ADL function  Description: INTERVENTIONS:  -  Assess patient's ability to carry out ADLs; assess patient's baseline for ADL function and identify physical deficits which impact ability to perform ADLs (bathing, care of mouth/teeth, toileting, grooming, dressing, etc.)  - Assess/evaluate cause of self-care deficits   - Assess range of motion  - Assess patient's mobility; develop plan if impaired  - Assess patient's need for assistive devices and provide as appropriate  - Encourage maximum independence but intervene and supervise when necessary  - Involve family in performance of ADLs  - Assess for home care needs following discharge   - Consider OT consult to assist with ADL evaluation and planning for discharge  - Provide patient education as appropriate  - Monitor functional capacity and physical performance, use of AM PAC & JH-HLM   - Monitor gait, balance and fatigue with ambulation    Outcome: Progressing  Goal: Maintains/Returns to pre admission functional level  Description: INTERVENTIONS:  - Perform AM-PAC 6 Click Basic Mobility/ Daily Activity assessment daily.  - Set and communicate daily mobility goal to care team  and patient/family/caregiver.   - Collaborate with rehabilitation services on mobility goals if consulted  - Perform Range of Motion 3 times a day.  - Dangle patient 3 times a day  - Stand patient 3 times a day  - Ambulate patient 3 times a day  - Out of bed to chair 3 times a day   - Out of bed for meals 3 times a day  - Out of bed for toileting  - Record patient progress and toleration of activity level   Outcome: Progressing     Problem: DISCHARGE PLANNING  Goal: Discharge to home or other facility with appropriate resources  Description: INTERVENTIONS:  - Identify barriers to discharge w/patient and caregiver  - Arrange for needed discharge resources and transportation as appropriate  - Identify discharge learning needs (meds, wound care, etc.)  - Arrange for interpretive services to assist at discharge as needed  - Refer to Case Management Department for coordinating discharge planning if the patient needs post-hospital services based on physician/advanced practitioner order or complex needs related to functional status, cognitive ability, or social support system  Outcome: Progressing     Problem: Knowledge Deficit  Goal: Patient/family/caregiver demonstrates understanding of disease process, treatment plan, medications, and discharge instructions  Description: Complete learning assessment and assess knowledge base.  Interventions:  - Provide teaching at level of understanding  - Provide teaching via preferred learning methods  Outcome: Progressing

## 2025-06-27 VITALS
HEIGHT: 63 IN | RESPIRATION RATE: 18 BRPM | OXYGEN SATURATION: 96 % | TEMPERATURE: 98.1 F | WEIGHT: 121.91 LBS | SYSTOLIC BLOOD PRESSURE: 110 MMHG | DIASTOLIC BLOOD PRESSURE: 54 MMHG | BODY MASS INDEX: 21.6 KG/M2 | HEART RATE: 65 BPM

## 2025-06-27 LAB
ANION GAP SERPL CALCULATED.3IONS-SCNC: 3 MMOL/L (ref 4–13)
ANION GAP SERPL CALCULATED.3IONS-SCNC: 3 MMOL/L (ref 4–13)
BUN SERPL-MCNC: 21 MG/DL (ref 5–25)
BUN SERPL-MCNC: 21 MG/DL (ref 5–25)
CALCIUM SERPL-MCNC: 8.7 MG/DL (ref 8.4–10.2)
CALCIUM SERPL-MCNC: 8.7 MG/DL (ref 8.4–10.2)
CHLORIDE SERPL-SCNC: 109 MMOL/L (ref 96–108)
CHLORIDE SERPL-SCNC: 109 MMOL/L (ref 96–108)
CO2 SERPL-SCNC: 25 MMOL/L (ref 21–32)
CO2 SERPL-SCNC: 25 MMOL/L (ref 21–32)
CREAT SERPL-MCNC: 1.15 MG/DL (ref 0.6–1.3)
CREAT SERPL-MCNC: 1.15 MG/DL (ref 0.6–1.3)
GFR SERPL CREATININE-BSD FRML MDRD: 44 ML/MIN/1.73SQ M
GFR SERPL CREATININE-BSD FRML MDRD: 44 ML/MIN/1.73SQ M
GLUCOSE SERPL-MCNC: 105 MG/DL (ref 65–140)
GLUCOSE SERPL-MCNC: 105 MG/DL (ref 65–140)
POTASSIUM SERPL-SCNC: 5 MMOL/L (ref 3.5–5.3)
POTASSIUM SERPL-SCNC: 5 MMOL/L (ref 3.5–5.3)
SODIUM SERPL-SCNC: 137 MMOL/L (ref 135–147)
SODIUM SERPL-SCNC: 137 MMOL/L (ref 135–147)

## 2025-06-27 PROCEDURE — 97530 THERAPEUTIC ACTIVITIES: CPT

## 2025-06-27 PROCEDURE — 97129 THER IVNTJ 1ST 15 MIN: CPT

## 2025-06-27 PROCEDURE — NC001 PR NO CHARGE

## 2025-06-27 PROCEDURE — 99233 SBSQ HOSP IP/OBS HIGH 50: CPT | Performed by: FAMILY MEDICINE

## 2025-06-27 PROCEDURE — 80048 BASIC METABOLIC PNL TOTAL CA: CPT | Performed by: SURGERY

## 2025-06-27 PROCEDURE — 97110 THERAPEUTIC EXERCISES: CPT

## 2025-06-27 PROCEDURE — 99232 SBSQ HOSP IP/OBS MODERATE 35: CPT | Performed by: SURGERY

## 2025-06-27 RX ORDER — OXYCODONE HYDROCHLORIDE 5 MG/1
5 TABLET ORAL EVERY 4 HOURS PRN
Refills: 0 | Status: CANCELLED | OUTPATIENT
Start: 2025-06-27

## 2025-06-27 RX ORDER — PRAVASTATIN SODIUM 40 MG
40 TABLET ORAL
Status: CANCELLED | OUTPATIENT
Start: 2025-06-28

## 2025-06-27 RX ORDER — BUPRENORPHINE 10 UG/H
10 PATCH TRANSDERMAL
Status: CANCELLED | OUTPATIENT
Start: 2025-07-04

## 2025-06-27 RX ORDER — DULOXETIN HYDROCHLORIDE 60 MG/1
60 CAPSULE, DELAYED RELEASE ORAL DAILY
Status: CANCELLED | OUTPATIENT
Start: 2025-06-28

## 2025-06-27 RX ORDER — ACETAMINOPHEN 325 MG/1
975 TABLET ORAL EVERY 8 HOURS SCHEDULED
Status: CANCELLED | OUTPATIENT
Start: 2025-06-27

## 2025-06-27 RX ORDER — ONDANSETRON 2 MG/ML
4 INJECTION INTRAMUSCULAR; INTRAVENOUS EVERY 4 HOURS PRN
Status: CANCELLED | OUTPATIENT
Start: 2025-06-27

## 2025-06-27 RX ORDER — METOPROLOL SUCCINATE 25 MG/1
25 TABLET, EXTENDED RELEASE ORAL DAILY
Status: CANCELLED | OUTPATIENT
Start: 2025-06-28

## 2025-06-27 RX ORDER — POLYETHYLENE GLYCOL 3350 17 G/17G
17 POWDER, FOR SOLUTION ORAL DAILY PRN
Status: CANCELLED | OUTPATIENT
Start: 2025-06-27

## 2025-06-27 RX ORDER — GABAPENTIN 400 MG/1
400 CAPSULE ORAL 2 TIMES DAILY
Status: CANCELLED | OUTPATIENT
Start: 2025-06-28

## 2025-06-27 RX ORDER — BUPRENORPHINE 10 UG/H
10 PATCH TRANSDERMAL
Status: DISCONTINUED | OUTPATIENT
Start: 2025-06-27 | End: 2025-06-28 | Stop reason: HOSPADM

## 2025-06-27 RX ADMIN — GABAPENTIN 400 MG: 400 CAPSULE ORAL at 09:53

## 2025-06-27 RX ADMIN — BUPRENORPHINE 10 MCG: 10 PATCH TRANSDERMAL at 10:43

## 2025-06-27 RX ADMIN — DULOXETINE 60 MG: 60 CAPSULE, DELAYED RELEASE ORAL at 09:53

## 2025-06-27 RX ADMIN — ACETAMINOPHEN 975 MG: 325 TABLET, FILM COATED ORAL at 21:36

## 2025-06-27 RX ADMIN — APIXABAN 2.5 MG: 2.5 TABLET, FILM COATED ORAL at 17:10

## 2025-06-27 RX ADMIN — METOPROLOL SUCCINATE 25 MG: 25 TABLET, EXTENDED RELEASE ORAL at 09:53

## 2025-06-27 RX ADMIN — GABAPENTIN 400 MG: 400 CAPSULE ORAL at 17:10

## 2025-06-27 RX ADMIN — ACETAMINOPHEN 975 MG: 325 TABLET, FILM COATED ORAL at 14:10

## 2025-06-27 RX ADMIN — PRAVASTATIN SODIUM 40 MG: 40 TABLET ORAL at 17:10

## 2025-06-27 RX ADMIN — APIXABAN 2.5 MG: 2.5 TABLET, FILM COATED ORAL at 09:53
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
## 2025-06-27 NOTE — QUICK NOTE
I contacted patient's chronic pain management office and spoke with staff to clarify the recommended current dose of her buprenorphine patch. The office was able to confirm that as of 6/19/25, she is to be on Burprenorphine 10 mcg patch to be changed weekly. Patient reports this is changed every Friday. Patch ordered as stated. She will f/u with Dr. Altamirano on discharge as well.

## 2025-06-27 NOTE — PHYSICAL THERAPY NOTE
PHYSICAL THERAPY NOTE          Patient Name: Divine Conde  Today's Date: 6/27/2025 06/27/25 1150   PT Last Visit   PT Visit Date 06/27/25   Note Type   Note Type Treatment   Pain Assessment   Pain Assessment Tool 0-10   Pain Score No Pain   Patient's Stated Pain Goal No pain   Hospital Pain Intervention(s) Repositioned;Ambulation/increased activity;Elevated;Rest   Multiple Pain Sites No   Restrictions/Precautions   Weight Bearing Precautions Per Order Yes   RLE Weight Bearing Per Order NWB   Braces or Orthoses   (RLE splint)   Other Precautions Cognitive;Chair Alarm;Bed Alarm;Fall Risk;Pain   General   Chart Reviewed Yes   Response to Previous Treatment Patient with no complaints from previous session.   Family/Caregiver Present No   Cognition   Overall Cognitive Status (S)  Impaired   Arousal/Participation Alert;Responsive;Cooperative   Attention Attends with cues to redirect   Orientation Level Oriented X4   Memory Decreased short term memory;Decreased recall of precautions   Following Commands Follows one step commands with increased time or repetition   Comments pt continues to be pleasant and cooperative throughout [facility] tx session. pt continues to require Vc's for adhering to her NWB precautions with functional transfers and ambulation   Subjective   Subjective pt reports no pain pree/post tx lillianison, was agreeable to participate in PT tx session   Bed Mobility   Additional Comments pt seated OOB in the recliner pre/post tx session   Transfers   Sit to Stand 3  Moderate assistance   Additional items Assist x 1;Armrests;Increased time required;Verbal cues   Stand to Sit 3  Moderate assistance   Additional items Assist x 1;Armrests;Increased time required;Verbal cues   Stand pivot Unable to assess   Additional Comments pt required mod Ax1 for all functional transfesr with RW, Vc's for proper hand placement while ascending  to RW and descending to [facility] recliner   Ambulation/Elevation   Gait pattern Not appropriate  (unable to maintain NWB precautions during ambulation trial.)   Balance   Static Sitting Fair   Dynamic Sitting Fair -   Static Standing Poor   Dynamic Standing Poor   Ambulatory Zero   Endurance Deficit   Endurance Deficit Yes   Endurance Deficit Description limited standing tolerance   Activity Tolerance   Activity Tolerance Other (Comment)  (generalized weakness)   Nurse Made Aware Spoke to RN   Exercises   Quad Sets Sitting;15 reps;AROM;Bilateral   Hip Abduction Sitting;15 reps;AROM;Bilateral   Hip Adduction Sitting;15 reps;AROM;Bilateral   Knee AROM Short Arc Quad Sitting;15 reps;AROM;Bilateral   Knee AROM Long Arc Quad Sitting;15 reps;AROM;Bilateral   Ankle Pumps Sitting;20 reps;AROM;Left   Marching Sitting;15 reps;AROM;Bilateral   Assessment   Problem List Decreased strength;Decreased endurance;Impaired balance;Decreased mobility;Orthopedic restrictions   Assessment pt begna tx session  seated OOB in the recliner as pt was agreeable to participate in PT tx session. PT to focus on transfer training, TE activities, static/dynamic standing tolerance/balance. Progress was noted with STS transfers as pt was able to complete multiple functional transfers with mod Ax1. pt does continue to require assistance for maintaining her NWB precautions of her RLE. pt did increase her static standing tolerance/balance to 1 minute before requiring a seated therapeutic rest break due to fatigue and the inability to maintain her NWB precautions. pt continues to be limited with activity tolerance, functional mobility and ambulation distance as pt was unable to advance LLE during ambulation trial with RW while maintaining her NWB precautions. pt did participate in TE activitiues while seated in [facility] recliner iwth AROM for bilateral LE's w/o increases pain and good form throughout. Post tx pt continues to be at an increased risk for falls and  injuires with all OOB activities and continues to be functioning below her baseline. pt would benefit from continued skilled PT intervention in order to address pt deficits listed above. Continue to recommend DC w/ level 2 moderate rehab resource intensity when medically cleared   Barriers to Discharge Inaccessible home environment;Decreased caregiver support   Goals   Patient Goals to get better and be able to walk   STG Expiration Date 07/04/25   PT Treatment Day 2   Plan   Treatment/Interventions Functional transfer training;LE strengthening/ROM;Therapeutic exercise;Endurance training;Patient/family training;Equipment eval/education;Bed mobility;Compensatory technique education;Spoke to nursing;OT  (WC training, pt refused WC training in todays tx session stating she will nmot be using a WC at home)   Progress Slow progress, decreased activity tolerance   PT Frequency 3-5x/wk   Discharge Recommendation   Rehab Resource Intensity Level, PT II (Moderate Resource Intensity)   Equipment Recommended Walker   Walker Package Recommended Wheeled walker   Change/add to Walker Package? No   AM-PAC Basic Mobility Inpatient   Turning in Flat Bed Without Bedrails 3   Lying on Back to Sitting on Edge of Flat Bed Without Bedrails 2   Moving Bed to Chair 2   Standing Up From Chair Using Arms 2   Walk in Room 1   Climb 3-5 Stairs With Railing 1   Basic Mobility Inpatient Raw Score 11   Basic Mobility Standardized Score 30.25   [facility] Highest Level Of Mobility   -[facility] Goal 4: Move to chair/commode   -[facility] Achieved 5: Stand (1 or more minutes)   Education   Education Provided Other  (funcitonla transfers, TE activities, standing balance/tolerance)   Patient Reinforcement needed   End of Consult   Patient Position at End of Consult Bedside chair;Bed/Chair alarm activated;All needs within reach   The patient's AM-PAC Basic Mobility Inpatient Short Form Raw Score is 11. A Raw score of less than or equal to 16 suggests the  patient may benefit from discharge to post-acute rehabilitation services. Please also refer to the recommendation of the Physical Therapist for safe discharge planning.    Jm Villarreal
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
## 2025-06-27 NOTE — Clinical Note
Pt is seen for OT treatment session 6/27/25 including interventions to: increase independence with functional transfers with fall prevention strategies, assess cognition.  Pt completed MoCA 8.1 in a quiet environment and scored ***/30, implying ***.  From an OT standpoint, continue to recommend level II rehab resources upon d/c.  Pt is to continue to benefit from skilled occupational therapy services while in the hospital to maximize functioning and independence with daily activities.
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
## 2025-06-27 NOTE — PLAN OF CARE
Problem: Prexisting or High Potential for Compromised Skin Integrity  Goal: Skin integrity is maintained or improved  Description: INTERVENTIONS:  - Identify patients at risk for skin breakdown  - Assess and monitor skin integrity including under and around medical devices   - Assess and monitor nutrition and hydration status  - Monitor labs  - Assess for incontinence   - Turn and reposition patient  - Assist with mobility/ambulation  - Relieve pressure over alirio prominences   - Avoid friction and shearing  - Provide appropriate hygiene as needed including keeping skin clean and dry  - Evaluate need for skin moisturizer/barrier cream  - Collaborate with interdisciplinary team  - Patient/family teaching  - Consider wound care consult    Assess:  - Review Asa scale daily  - Clean and moisturize skin every day  - Inspect skin when repositioning, toileting, and assisting with ADLS  - Assess under medical devices such as IV every day  - Assess extremities for adequate circulation and sensation     Bed Management:  - Have minimal linens on bed & keep smooth, unwrinkled  - Change linens as needed when moist or perspiring  - Avoid sitting or lying in one position for more than 2 hours while in bed?Keep HOB at 20 degrees   - Toileting:  - Offer bedside commode  - Assess for incontinence every hour  - Use incontinent care products after each incontinent episode    Activity:  - Mobilize patient 3 times a day  - Encourage activity and walks on unit  - Encourage or provide ROM exercises   - Turn and reposition patient every 2 Hours  - Use appropriate equipment to lift or move patient in bed  - Instruct/ Assist with weight shifting every 2 hours when out of bed in chair  - Consider limitation of chair time 4 hour intervals    Skin Care:  - Avoid use of baby powder, tape, friction and shearing, hot water or constrictive clothing  - Relieve pressure over bony prominences using foam  - Do not massage red bony areas    Next  Steps:  - Consider consults to  interdisciplinary teams such as pt/ot  Outcome: Progressing     Problem: PAIN - ADULT  Goal: Verbalizes/displays adequate comfort level or baseline comfort level  Description: Interventions:  - Encourage patient to monitor pain and request assistance  - Assess pain using appropriate pain scale  - Administer analgesics as ordered based on type and severity of pain and evaluate response  - Implement non-pharmacological measures as appropriate and evaluate response  - Consider cultural and social influences on pain and pain management  - Notify physician/advanced practitioner if interventions unsuccessful or patient reports new pain  - Educate patient/family on pain management process including their role and importance of  reporting pain   - Provide non-pharmacologic/complimentary pain relief interventions  Outcome: Progressing     Problem: INFECTION - ADULT  Goal: Absence or prevention of progression during hospitalization  Description: INTERVENTIONS:  - Assess and monitor for signs and symptoms of infection  - Monitor lab/diagnostic results  - Monitor all insertion sites, i.e. indwelling lines, tubes, and drains  - Monitor endotracheal if appropriate and nasal secretions for changes in amount and color  - [place] appropriate cooling/warming therapies per order  - Administer medications as ordered  - Instruct and encourage patient and family to use good hand hygiene technique  - Identify and instruct in appropriate isolation precautions for identified infection/condition  Outcome: Progressing  Goal: Absence of fever/infection during neutropenic period  Description: INTERVENTIONS:  - Monitor WBC  - Perform strict hand hygiene  - Limit to healthy visitors only  - No plants, dried, fresh or silk flowers with miller in patient room  Outcome: Progressing     Problem: SAFETY ADULT  Goal: Patient will remain free of falls  Description: INTERVENTIONS:  - Educate patient/family on patient  safety including physical limitations  - Instruct patient to call for assistance with activity   - Consider consulting OT/PT to assist with strengthening/mobility based on AM PAC & JH-HLM score  - Consult OT/PT to assist with strengthening/mobility   - Keep Call bell within reach  - Keep bed low and locked with side rails adjusted as appropriate  - Keep care items and personal belongings within reach  - Initiate and maintain comfort rounds  - Make Fall Risk Sign visible to staff  - Offer Toileting every 2 Hours, in advance of need  - Initiate/Maintain bed/chair alarm  - Obtain necessary fall risk management equipment:   - Apply yellow socks and bracelet for high fall risk patients  - Consider moving patient to room near nurses station  Outcome: Progressing  Goal: Maintain or return to baseline ADL function  Description: INTERVENTIONS:  -  Assess patient's ability to carry out ADLs; assess patient's baseline for ADL function and identify physical deficits which impact ability to perform ADLs (bathing, care of mouth/teeth, toileting, grooming, dressing, etc.)  - Assess/evaluate cause of self-care deficits   - Assess range of motion  - Assess patient's mobility; develop plan if impaired  - Assess patient's need for assistive devices and provide as appropriate  - Encourage maximum independence but intervene and supervise when necessary  - Involve family in performance of ADLs  - Assess for home care needs following discharge   - Consider OT consult to assist with ADL evaluation and planning for discharge  - Provide patient education as appropriate  - Monitor functional capacity and physical performance, use of AM PAC & JH-HLM   - Monitor gait, balance and fatigue with ambulation    Outcome: Progressing  Goal: Maintains/Returns to pre admission functional level  Description: INTERVENTIONS:  - Perform AM-PAC 6 Click Basic Mobility/ Daily Activity assessment daily.  - Set and communicate daily mobility goal to care team  and patient/family/caregiver.   - Collaborate with rehabilitation services on mobility goals if consulted  - Perform Range of Motion 3 times a day.  - Dangle patient 3 times a day  - Stand patient 3 times a day  - Ambulate patient 3 times a day  - Out of bed to chair 3 times a day   - Out of bed for meals 3 times a day  - Out of bed for toileting  - Record patient progress and toleration of activity level   Outcome: Progressing     Problem: DISCHARGE PLANNING  Goal: Discharge to home or other facility with appropriate resources  Description: INTERVENTIONS:  - Identify barriers to discharge w/patient and caregiver  - Arrange for needed discharge resources and transportation as appropriate  - Identify discharge learning needs (meds, wound care, etc.)  - Arrange for interpretive services to assist at discharge as needed  - Refer to Case Management Department for coordinating discharge planning if the patient needs post-hospital services based on physician/advanced practitioner order or complex needs related to functional status, cognitive ability, or social support system  Outcome: Progressing     Problem: Knowledge Deficit  Goal: Patient/family/caregiver demonstrates understanding of disease process, treatment plan, medications, and discharge instructions  Description: Complete learning assessment and assess knowledge base.  Interventions:  - Provide teaching at level of understanding  - Provide teaching via preferred learning methods  Outcome: Progressing

## 2025-06-27 NOTE — PROGRESS NOTES
Patient:  RACHEL HIRSCH    MRN:  [id number]    [place] Request ID:  [id number]    Level of care reserved:    Partner Reserved:    Clinical needs requested:    Geography searched:  20 miles around [id number]    Start of Service:    Request sent:  9:28am EDT on 6/26/2025 by Sekou Rogers    Partner reserved:  by Sekou Rogers    Choice list shared:  2:55pm EDT on 6/27/2025 by Sekou Rogers

## 2025-06-27 NOTE — PLAN OF CARE
Problem: Prexisting or High Potential for Compromised Skin Integrity  Goal: Skin integrity is maintained or improved  Description: INTERVENTIONS:  - Identify patients at risk for skin breakdown  - Assess and monitor skin integrity including under and around medical devices   - Assess and monitor nutrition and hydration status  - Monitor labs  - Assess for incontinence   - Turn and reposition patient  - Assist with mobility/ambulation  - Relieve pressure over alirio prominences   - Avoid friction and shearing  - Provide appropriate hygiene as needed including keeping skin clean and dry  - Evaluate need for skin moisturizer/barrier cream  - Collaborate with interdisciplinary team  - Patient/family teaching  - Consider wound care consult    Assess:  - Review Asa scale daily  - Clean and moisturize skin every day  - Inspect skin when repositioning, toileting, and assisting with ADLS  - Assess under medical devices such as IV every day  - Assess extremities for adequate circulation and sensation     Bed Management:  - Have minimal linens on bed & keep smooth, unwrinkled  - Change linens as needed when moist or perspiring  - Avoid sitting or lying in one position for more than 2 hours while in bed?Keep HOB at 20 degrees   - Toileting:  - Offer bedside commode  - Assess for incontinence every hour  - Use incontinent care products after each incontinent episode    Activity:  - Mobilize patient 3 times a day  - Encourage activity and walks on unit  - Encourage or provide ROM exercises   - Turn and reposition patient every 2 Hours  - Use appropriate equipment to lift or move patient in bed  - Instruct/ Assist with weight shifting every 2 hours when out of bed in chair  - Consider limitation of chair time 4 hour intervals    Skin Care:  - Avoid use of baby powder, tape, friction and shearing, hot water or constrictive clothing  - Relieve pressure over bony prominences using foam  - Do not massage red bony areas    Next  Steps:  - Consider consults to  interdisciplinary teams such as pt/ot  Outcome: Progressing     Problem: PAIN - ADULT  Goal: Verbalizes/displays adequate comfort level or baseline comfort level  Description: Interventions:  - Encourage patient to monitor pain and request assistance  - Assess pain using appropriate pain scale  - Administer analgesics as ordered based on type and severity of pain and evaluate response  - Implement non-pharmacological measures as appropriate and evaluate response  - Consider cultural and social influences on pain and pain management  - Notify physician/advanced practitioner if interventions unsuccessful or patient reports new pain  - Educate patient/family on pain management process including their role and importance of  reporting pain   - Provide non-pharmacologic/complimentary pain relief interventions  Outcome: Progressing     Problem: INFECTION - ADULT  Goal: Absence or prevention of progression during hospitalization  Description: INTERVENTIONS:  - Assess and monitor for signs and symptoms of infection  - Monitor lab/diagnostic results  - Monitor all insertion sites, i.e. indwelling lines, tubes, and drains  - Monitor endotracheal if appropriate and nasal secretions for changes in amount and color  - [place] appropriate cooling/warming therapies per order  - Administer medications as ordered  - Instruct and encourage patient and family to use good hand hygiene technique  - Identify and instruct in appropriate isolation precautions for identified infection/condition  Outcome: Progressing  Goal: Absence of fever/infection during neutropenic period  Description: INTERVENTIONS:  - Monitor WBC  - Perform strict hand hygiene  - Limit to healthy visitors only  - No plants, dried, fresh or silk flowers with miller in patient room  Outcome: Progressing     Problem: SAFETY ADULT  Goal: Patient will remain free of falls  Description: INTERVENTIONS:  - Educate patient/family on patient  safety including physical limitations  - Instruct patient to call for assistance with activity   - Consider consulting OT/PT to assist with strengthening/mobility based on AM PAC & JH-HLM score  - Consult OT/PT to assist with strengthening/mobility   - Keep Call bell within reach  - Keep bed low and locked with side rails adjusted as appropriate  - Keep care items and personal belongings within reach  - Initiate and maintain comfort rounds  - Make Fall Risk Sign visible to staff  - Offer Toileting every 2 Hours, in advance of need  - Initiate/Maintain bed/chair alarm  - Obtain necessary fall risk management equipment:   - Apply yellow socks and bracelet for high fall risk patients  - Consider moving patient to room near nurses station  Outcome: Progressing  Goal: Maintain or return to baseline ADL function  Description: INTERVENTIONS:  -  Assess patient's ability to carry out ADLs; assess patient's baseline for ADL function and identify physical deficits which impact ability to perform ADLs (bathing, care of mouth/teeth, toileting, grooming, dressing, etc.)  - Assess/evaluate cause of self-care deficits   - Assess range of motion  - Assess patient's mobility; develop plan if impaired  - Assess patient's need for assistive devices and provide as appropriate  - Encourage maximum independence but intervene and supervise when necessary  - Involve family in performance of ADLs  - Assess for home care needs following discharge   - Consider OT consult to assist with ADL evaluation and planning for discharge  - Provide patient education as appropriate  - Monitor functional capacity and physical performance, use of AM PAC & JH-HLM   - Monitor gait, balance and fatigue with ambulation    Outcome: Progressing  Goal: Maintains/Returns to pre admission functional level  Description: INTERVENTIONS:  - Perform AM-PAC 6 Click Basic Mobility/ Daily Activity assessment daily.  - Set and communicate daily mobility goal to care team  and patient/family/caregiver.   - Collaborate with rehabilitation services on mobility goals if consulted  - Perform Range of Motion 3 times a day.  - Dangle patient 3 times a day  - Stand patient 3 times a day  - Ambulate patient 3 times a day  - Out of bed to chair 3 times a day   - Out of bed for meals 3 times a day  - Out of bed for toileting  - Record patient progress and toleration of activity level   Outcome: Progressing     Problem: DISCHARGE PLANNING  Goal: Discharge to home or other facility with appropriate resources  Description: INTERVENTIONS:  - Identify barriers to discharge w/patient and caregiver  - Arrange for needed discharge resources and transportation as appropriate  - Identify discharge learning needs (meds, wound care, etc.)  - Arrange for interpretive services to assist at discharge as needed  - Refer to Case Management Department for coordinating discharge planning if the patient needs post-hospital services based on physician/advanced practitioner order or complex needs related to functional status, cognitive ability, or social support system  Outcome: Progressing     Problem: Knowledge Deficit  Goal: Patient/family/caregiver demonstrates understanding of disease process, treatment plan, medications, and discharge instructions  Description: Complete learning assessment and assess knowledge base.  Interventions:  - Provide teaching at level of understanding  - Provide teaching via preferred learning methods  Outcome: Progressing

## 2025-06-27 NOTE — PLAN OF CARE
Problem: PHYSICAL THERAPY ADULT  Goal: Performs mobility at highest level of function for planned discharge setting.  See evaluation for individualized goals.  Description: Treatment/Interventions: Functional transfer training, LE strengthening/ROM, Therapeutic exercise, Endurance training, Patient/family training, Equipment eval/education, Bed mobility, Gait training, Compensatory technique education (WC training)  Equipment Recommended: Walker       See flowsheet documentation for full assessment, interventions and recommendations.  Outcome: Progressing  Note:    Problem List: Decreased strength, Decreased endurance, Impaired balance, Decreased mobility, Orthopedic restrictions  Assessment: pt begna tx session  seated OOB in the recliner as pt was agreeable to participate in PT tx session. PT to focus on transfer training, TE activities, static/dynamic standing tolerance/balance. Progress was noted with STS transfers as pt was able to complete multiple functional transfers with mod Ax1. pt does continue to require assistance for maintaining her NWB precautions of her RLE. pt did increase her static standing tolerance/balance to 1 minute before requiring a seated therapeutic rest break due to fatigue and the inability to maintain her NWB precautions. pt continues to be limited with activity tolerance, functional mobility and ambulation distance as pt was unable to advance LLE during ambulation trial with RW while maintaining her NWB precautions. pt did participate in TE activitiues while seated in [facility] recliner iwth AROM for bilateral LE's w/o increases pain and good form throughout. Post tx pt continues to be at an increased risk for falls and injuires with all OOB activities and continues to be functioning below her baseline. pt would benefit from continued skilled PT intervention in order to address pt deficits listed above. Continue to recommend DC w/ level 2 moderate rehab resource intensity when medically  cleared  Barriers to Discharge: Inaccessible home environment, Decreased caregiver support     Rehab Resource Intensity Level, PT: II (Moderate Resource Intensity)    See flowsheet documentation for full assessment.

## 2025-06-27 NOTE — ASSESSMENT & PLAN NOTE
With history of cervical and lumbar spin stenosis and vertebral osteomyelitis   Maintained on buprenorphine patch 10 mcg/h q 7days, due to change on Fridays  Manage per primary team

## 2025-06-28 ENCOUNTER — HOSPITAL ENCOUNTER (INPATIENT)
Facility: HOSPITAL | Age: 80
LOS: 2 days | Discharge: NURSING, REHAB OR LONG-TERM CARE | End: 2025-06-30
Attending: SURGERY | Admitting: SURGERY
Payer: COMMERCIAL

## 2025-06-28 DIAGNOSIS — S92.001A CLOSED NONDISPLACED FRACTURE OF RIGHT CALCANEUS, UNSPECIFIED PORTION OF CALCANEUS, INITIAL ENCOUNTER: ICD-10-CM

## 2025-06-28 DIAGNOSIS — G89.29 CHRONIC LOW BACK PAIN, UNSPECIFIED BACK PAIN LATERALITY, UNSPECIFIED WHETHER SCIATICA PRESENT: ICD-10-CM
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
## 2025-06-28 DIAGNOSIS — I48.91 ATRIAL FIBRILLATION, UNSPECIFIED TYPE (HCC): ICD-10-CM

## 2025-06-28 DIAGNOSIS — M54.50 CHRONIC LOW BACK PAIN, UNSPECIFIED BACK PAIN LATERALITY, UNSPECIFIED WHETHER SCIATICA PRESENT: ICD-10-CM

## 2025-06-28 LAB
BACTERIA UR QL AUTO: NORMAL /HPF
BACTERIA UR QL AUTO: NORMAL /HPF
BILIRUB UR QL STRIP: NEGATIVE
BILIRUB UR QL STRIP: NEGATIVE
CLARITY UR: ABNORMAL
CLARITY UR: ABNORMAL
COLOR UR: ABNORMAL
COLOR UR: ABNORMAL
GLUCOSE UR STRIP-MCNC: NEGATIVE MG/DL
GLUCOSE UR STRIP-MCNC: NEGATIVE MG/DL
HGB UR QL STRIP.AUTO: NEGATIVE
HGB UR QL STRIP.AUTO: NEGATIVE
KETONES UR STRIP-MCNC: NEGATIVE MG/DL
KETONES UR STRIP-MCNC: NEGATIVE MG/DL
LEUKOCYTE ESTERASE UR QL STRIP: ABNORMAL
LEUKOCYTE ESTERASE UR QL STRIP: ABNORMAL
NITRITE UR QL STRIP: POSITIVE
NITRITE UR QL STRIP: POSITIVE
NON-SQ EPI CELLS URNS QL MICRO: NORMAL /HPF
NON-SQ EPI CELLS URNS QL MICRO: NORMAL /HPF
PH UR STRIP.AUTO: 5.5 [PH]
PH UR STRIP.AUTO: 5.5 [PH]
PROT UR STRIP-MCNC: NEGATIVE MG/DL
PROT UR STRIP-MCNC: NEGATIVE MG/DL
RBC #/AREA URNS AUTO: NORMAL /HPF
RBC #/AREA URNS AUTO: NORMAL /HPF
SP GR UR STRIP.AUTO: 1.01 (ref 1–1.03)
SP GR UR STRIP.AUTO: 1.01 (ref 1–1.03)
UROBILINOGEN UR STRIP-ACNC: <2 MG/DL
UROBILINOGEN UR STRIP-ACNC: <2 MG/DL
WBC #/AREA URNS AUTO: NORMAL /HPF
WBC #/AREA URNS AUTO: NORMAL /HPF

## 2025-06-28 PROCEDURE — NC001 PR NO CHARGE

## 2025-06-28 PROCEDURE — 99232 SBSQ HOSP IP/OBS MODERATE 35: CPT | Performed by: PHYSICIAN ASSISTANT

## 2025-06-28 PROCEDURE — 81001 URINALYSIS AUTO W/SCOPE: CPT

## 2025-06-28 RX ORDER — PRAVASTATIN SODIUM 40 MG
40 TABLET ORAL
Status: DISCONTINUED | OUTPATIENT
Start: 2025-06-28 | End: 2025-06-30 | Stop reason: HOSPADM

## 2025-06-28 RX ORDER — POLYETHYLENE GLYCOL 3350 17 G/17G
17 POWDER, FOR SOLUTION ORAL DAILY PRN
Status: DISCONTINUED | OUTPATIENT
Start: 2025-06-28 | End: 2025-06-30 | Stop reason: HOSPADM

## 2025-06-28 RX ORDER — METOPROLOL SUCCINATE 25 MG/1
25 TABLET, EXTENDED RELEASE ORAL DAILY
Status: DISCONTINUED | OUTPATIENT
Start: 2025-06-28 | End: 2025-06-30 | Stop reason: HOSPADM

## 2025-06-28 RX ORDER — ONDANSETRON 2 MG/ML
4 INJECTION INTRAMUSCULAR; INTRAVENOUS EVERY 4 HOURS PRN
Status: DISCONTINUED | OUTPATIENT
Start: 2025-06-28 | End: 2025-06-30 | Stop reason: HOSPADM

## 2025-06-28 RX ORDER — BUPRENORPHINE 10 UG/H
10 PATCH TRANSDERMAL
Status: DISCONTINUED | OUTPATIENT
Start: 2025-07-04 | End: 2025-06-30 | Stop reason: HOSPADM

## 2025-06-28 RX ORDER — DULOXETIN HYDROCHLORIDE 60 MG/1
60 CAPSULE, DELAYED RELEASE ORAL DAILY
Status: DISCONTINUED | OUTPATIENT
Start: 2025-06-28 | End: 2025-06-30 | Stop reason: HOSPADM

## 2025-06-28 RX ORDER — ACETAMINOPHEN 325 MG/1
975 TABLET ORAL EVERY 8 HOURS SCHEDULED
Status: DISCONTINUED | OUTPATIENT
Start: 2025-06-28 | End: 2025-06-30 | Stop reason: HOSPADM

## 2025-06-28 RX ORDER — OXYCODONE HYDROCHLORIDE 5 MG/1
5 TABLET ORAL EVERY 4 HOURS PRN
Status: DISCONTINUED | OUTPATIENT
Start: 2025-06-28 | End: 2025-06-30 | Stop reason: HOSPADM

## 2025-06-28 RX ORDER — GABAPENTIN 400 MG/1
400 CAPSULE ORAL 2 TIMES DAILY
Status: DISCONTINUED | OUTPATIENT
Start: 2025-06-28 | End: 2025-06-30 | Stop reason: HOSPADM

## 2025-06-28 RX ADMIN — ACETAMINOPHEN 975 MG: 325 TABLET ORAL at 06:23

## 2025-06-28 RX ADMIN — ACETAMINOPHEN 975 MG: 325 TABLET ORAL at 13:58

## 2025-06-28 RX ADMIN — ACETAMINOPHEN 975 MG: 325 TABLET ORAL at 21:19

## 2025-06-28 RX ADMIN — GABAPENTIN 400 MG: 400 CAPSULE ORAL at 17:16

## 2025-06-28 RX ADMIN — APIXABAN 2.5 MG: 2.5 TABLET, FILM COATED ORAL at 09:15

## 2025-06-28 RX ADMIN — APIXABAN 2.5 MG: 2.5 TABLET, FILM COATED ORAL at 17:16

## 2025-06-28 RX ADMIN — GABAPENTIN 400 MG: 400 CAPSULE ORAL at 09:15

## 2025-06-28 RX ADMIN — PRAVASTATIN SODIUM 40 MG: 40 TABLET ORAL at 17:16

## 2025-06-28 RX ADMIN — DULOXETINE 60 MG: 60 CAPSULE, DELAYED RELEASE ORAL at 09:15

## 2025-06-28 RX ADMIN — METOPROLOL SUCCINATE 25 MG: 25 TABLET, EXTENDED RELEASE ORAL at 09:15

## 2025-06-28 RX ADMIN — CEFTRIAXONE SODIUM 1000 MG: 10 INJECTION, POWDER, FOR SOLUTION INTRAVENOUS at 10:35

## 2025-06-28 NOTE — DISCHARGE SUMMARY
Discharge Summary - Trauma   Name: Divine Conde 80 y.o. female I MRN: [id number]  Unit/Bed#: W -01 I Date of Admission: 6/23/2025   Date of Service: 6/27/2025 I Hospital Day: 2      This discharge summary is part of the administrative requirements for the TIN cutover process.

## 2025-06-28 NOTE — CASE MANAGEMENT
Case Management Progress Note    Patient name Joycelyn Pierce  Location W /W -01 MRN [id number]  : 1945 Date 2025       LOS (days): 0  Geometric Mean LOS (GMLOS) (days):   Days to GMLOS:        OBJECTIVE:        Current admission status: Inpatient  Preferred Pharmacy:   UNKNOWN - FOLLOW UP PRIOR TO DISCHARGE TO E-PRESCRIBE  No address on file      Primary Care Provider: Chaitanya Morgan MD    Primary Insurance: MVERSE  Secondary Insurance:     PROGRESS NOTE:    Cm met with patient to review choice of STR. Patient choice is [facility]. CM to reserve in [place] and request insurance authorization.
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
## 2025-06-28 NOTE — UTILIZATION REVIEW
NOTIFICATION OF INPATIENT ADMISSION   AUTHORIZATION REQUEST   SERVICING FACILITY:   [place] [address]  Tax ID: [id number]  NPI: [id number]   ATTENDING PROVIDER:  Attending Name and NPI#: Amado Maurice Md [[id number]]  Address: [address] [place] [address]  Phone: [phone]     ADMISSION INFORMATION:  Place of Service: Inpatient [facility]  Place of Service Code: 21  Inpatient Admission Date/Time: 6/28/25 12:06 AM  Discharge Date/Time: No discharge date for patient encounter.  Admitting Diagnosis Code/Description:  Closed fracture of right calcaneus [S92.001A]     UTILIZATION REVIEW CONTACT:  Sophia Amato Utilization   Network Utilization Review Department  Phone: [phone]  Fax: [phone]  Email: [email or web address]  Contact for approvals/pending authorizations, clinical reviews, and discharge.     PHYSICIAN ADVISORY SERVICES:  Medical Necessity Denial & Splm-ne-Grho Review  Phone: [phone]  Fax: [phone]  Email: [email or web address]     DISCHARGE SUPPORT TEAM:  For Patients Discharge Needs & Updates  Phone: [phone] opt. 2 Fax: [phone]  Email: [email or web address]
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
## 2025-06-28 NOTE — PLAN OF CARE
Problem: PAIN - ADULT  Goal: Verbalizes/displays adequate comfort level or baseline comfort level  Description: Interventions:  - Encourage patient to monitor pain and request assistance  - Assess pain using appropriate pain scale  - Administer analgesics as ordered based on type and severity of pain and evaluate response  - Implement non-pharmacological measures as appropriate and evaluate response  - Consider cultural and social influences on pain and pain management  - Notify physician/advanced practitioner if interventions unsuccessful or patient reports new pain  - Educate patient/family on pain management process including their role and importance of  reporting pain   - Provide non-pharmacologic/complimentary pain relief interventions  Outcome: Progressing     Problem: INFECTION - ADULT  Goal: Absence or prevention of progression during hospitalization  Description: INTERVENTIONS:  - Assess and monitor for signs and symptoms of infection  - Monitor lab/diagnostic results  - Monitor all insertion sites, i.e. indwelling lines, tubes, and drains  - Monitor endotracheal if appropriate and nasal secretions for changes in amount and color  - [place] appropriate cooling/warming therapies per order  - Administer medications as ordered  - Instruct and encourage patient and family to use good hand hygiene technique  - Identify and instruct in appropriate isolation precautions for identified infection/condition  Outcome: Progressing

## 2025-06-28 NOTE — UTILIZATION REVIEW
Effective 6/28/25, Formerly [facility] has become a new legal entity. TAX ID # is now [id number] and NPI # is now [id number]. In order to facilitate the change in Tax ID and NPI, this patient had to be discharged and readmitted in our [facility] system. This patient did not physically relocate to another campus and remains in the same bed/unit as the previous encounter.

## 2025-06-28 NOTE — PLAN OF CARE
Problem: PAIN - ADULT  Goal: Verbalizes/displays adequate comfort level or baseline comfort level  Description: Interventions:  - Encourage patient to monitor pain and request assistance  - Assess pain using appropriate pain scale  - Administer analgesics as ordered based on type and severity of pain and evaluate response  - Implement non-pharmacological measures as appropriate and evaluate response  - Consider cultural and social influences on pain and pain management  - Notify physician/advanced practitioner if interventions unsuccessful or patient reports new pain  - Educate patient/family on pain management process including their role and importance of  reporting pain   - Provide non-pharmacologic/complimentary pain relief interventions  Outcome: Progressing     Problem: INFECTION - ADULT  Goal: Absence or prevention of progression during hospitalization  Description: INTERVENTIONS:  - Assess and monitor for signs and symptoms of infection  - Monitor lab/diagnostic results  - Monitor all insertion sites, i.e. indwelling lines, tubes, and drains  - Monitor endotracheal if appropriate and nasal secretions for changes in amount and color  - [place] appropriate cooling/warming therapies per order  - Administer medications as ordered  - Instruct and encourage patient and family to use good hand hygiene technique  - Identify and instruct in appropriate isolation precautions for identified infection/condition  Outcome: Progressing  Goal: Absence of fever/infection during neutropenic period  Description: INTERVENTIONS:  - Monitor WBC  - Perform strict hand hygiene  - Limit to healthy visitors only  - No plants, dried, fresh or silk flowers with miller in patient room  Outcome: Progressing     Problem: SAFETY ADULT  Goal: Patient will remain free of falls  Description: INTERVENTIONS:  - Educate patient/family on patient safety including physical limitations  - Instruct patient to call for assistance with activity   -  Consider consulting OT/PT to assist with strengthening/mobility based on AM PAC & JH-HLM score  - Consult OT/PT to assist with strengthening/mobility   - Keep Call bell within reach  - Keep bed low and locked with side rails adjusted as appropriate  - Keep care items and personal belongings within reach  - Initiate and maintain comfort rounds  - Make Fall Risk Sign visible to staff  - Offer Toileting every 2 Hours, in advance of need  - Initiate/Maintain bed/chair alarm  - Obtain necessary fall risk management equipment:   - Apply yellow socks and bracelet for high fall risk patients  - Consider moving patient to room near nurses station  Outcome: Progressing  Goal: Maintain or return to baseline ADL function  Description: INTERVENTIONS:  -  Assess patient's ability to carry out ADLs; assess patient's baseline for ADL function and identify physical deficits which impact ability to perform ADLs (bathing, care of mouth/teeth, toileting, grooming, dressing, etc.)  - Assess/evaluate cause of self-care deficits   - Assess range of motion  - Assess patient's mobility; develop plan if impaired  - Assess patient's need for assistive devices and provide as appropriate  - Encourage maximum independence but intervene and supervise when necessary  - Involve family in performance of ADLs  - Assess for home care needs following discharge   - Consider OT consult to assist with ADL evaluation and planning for discharge  - Provide patient education as appropriate  - Monitor functional capacity and physical performance, use of AM PAC & JH-HLM   - Monitor gait, balance and fatigue with ambulation    Outcome: Progressing  Goal: Maintains/Returns to pre admission functional level  Description: INTERVENTIONS:  - Perform AM-PAC 6 Click Basic Mobility/ Daily Activity assessment daily.  - Set and communicate daily mobility goal to care team and patient/family/caregiver.   - Collaborate with rehabilitation services on mobility goals if  consulted  - Perform Range of Motion 3 times a day.  - Dangle patient 3 times a day  - Stand patient 3 times a day  - Ambulate patient 3 times a day  - Out of bed to chair 3 times a day   - Out of bed for meals 3 times a day  - Out of bed for toileting  - Record patient progress and toleration of activity level   Outcome: Progressing

## 2025-06-29 PROCEDURE — 99232 SBSQ HOSP IP/OBS MODERATE 35: CPT | Performed by: SURGERY

## 2025-06-29 RX ORDER — AMOXICILLIN 250 MG
1 CAPSULE ORAL
Status: DISCONTINUED | OUTPATIENT
Start: 2025-06-29 | End: 2025-06-30 | Stop reason: HOSPADM

## 2025-06-29 RX ADMIN — APIXABAN 2.5 MG: 2.5 TABLET, FILM COATED ORAL at 08:32

## 2025-06-29 RX ADMIN — GABAPENTIN 400 MG: 400 CAPSULE ORAL at 18:04

## 2025-06-29 RX ADMIN — ACETAMINOPHEN 975 MG: 325 TABLET ORAL at 04:43

## 2025-06-29 RX ADMIN — ACETAMINOPHEN 975 MG: 325 TABLET ORAL at 22:22

## 2025-06-29 RX ADMIN — CEFTRIAXONE SODIUM 1000 MG: 10 INJECTION, POWDER, FOR SOLUTION INTRAVENOUS at 10:25

## 2025-06-29 RX ADMIN — APIXABAN 2.5 MG: 2.5 TABLET, FILM COATED ORAL at 18:05

## 2025-06-29 RX ADMIN — GABAPENTIN 400 MG: 400 CAPSULE ORAL at 08:32

## 2025-06-29 RX ADMIN — METOPROLOL SUCCINATE 25 MG: 25 TABLET, EXTENDED RELEASE ORAL at 08:32

## 2025-06-29 RX ADMIN — PRAVASTATIN SODIUM 40 MG: 40 TABLET ORAL at 15:45

## 2025-06-29 RX ADMIN — DULOXETINE 60 MG: 60 CAPSULE, DELAYED RELEASE ORAL at 08:32

## 2025-06-29 NOTE — PLAN OF CARE
Problem: PAIN - ADULT  Goal: Verbalizes/displays adequate comfort level or baseline comfort level  Description: Interventions:  - Encourage patient to monitor pain and request assistance  - Assess pain using appropriate pain scale  - Administer analgesics as ordered based on type and severity of pain and evaluate response  - Implement non-pharmacological measures as appropriate and evaluate response  - Consider cultural and social influences on pain and pain management  - Notify physician/advanced practitioner if interventions unsuccessful or patient reports new pain  - Educate patient/family on pain management process including their role and importance of  reporting pain   - Provide non-pharmacologic/complimentary pain relief interventions  Outcome: Progressing     Problem: INFECTION - ADULT  Goal: Absence or prevention of progression during hospitalization  Description: INTERVENTIONS:  - Assess and monitor for signs and symptoms of infection  - Monitor lab/diagnostic results  - Monitor all insertion sites, i.e. indwelling lines, tubes, and drains  - Monitor endotracheal if appropriate and nasal secretions for changes in amount and color  - [place] appropriate cooling/warming therapies per order  - Administer medications as ordered  - Instruct and encourage patient and family to use good hand hygiene technique  - Identify and instruct in appropriate isolation precautions for identified infection/condition  Outcome: Progressing  Goal: Absence of fever/infection during neutropenic period  Description: INTERVENTIONS:  - Monitor WBC  - Perform strict hand hygiene  - Limit to healthy visitors only  - No plants, dried, fresh or silk flowers with vel[place]co in patient room  Outcome: Progressing     Problem: SAFETY ADULT  Goal: Patient will remain free of falls  Description: INTERVENTIONS:  - Educate patient/family on patient safety including physical limitations  - Instruct patient to call for assistance with activity   -  Consider consulting OT/PT to assist with strengthening/mobility based on AM PAC & JH-HLM score  - Consult OT/PT to assist with strengthening/mobility   - Keep Call bell within reach  - Keep bed low and locked with side rails adjusted as appropriate  - Keep care items and personal belongings within reach  - Initiate and maintain comfort rounds  - Make Fall Risk Sign visible to staff  - Initiate/Maintain bed alarm  - Apply yellow socks and bracelet for high fall risk patients  - Consider moving patient to room near nurses station  Outcome: Progressing  Goal: Maintain or return to baseline ADL function  Description: INTERVENTIONS:  -  Assess patient's ability to carry out ADLs; assess patient's baseline for ADL function and identify physical deficits which impact ability to perform ADLs (bathing, care of mouth/teeth, toileting, grooming, dressing, etc.)  - Assess/evaluate cause of self-care deficits   - Assess range of motion  - Assess patient's mobility; develop plan if impaired  - Assess patient's need for assistive devices and provide as appropriate  - Encourage maximum independence but intervene and supervise when necessary  - Involve family in performance of ADLs  - Assess for home care needs following discharge   - Consider OT consult to assist with ADL evaluation and planning for discharge  - Provide patient education as appropriate  - Monitor functional capacity and physical performance, use of AM PAC & JH-HLM   - Monitor gait, balance and fatigue with ambulation    Outcome: Progressing  Goal: Maintains/Returns to pre admission functional level  Description: INTERVENTIONS:  - Perform AM-PAC 6 Click Basic Mobility/ Daily Activity assessment daily.  - Set and communicate daily mobility goal to care team and patient/family/caregiver.   - Collaborate with rehabilitation services on mobility goals if consulted  Problem: DISCHARGE PLANNING  Goal: Discharge to home or other facility with appropriate  resources  Description: INTERVENTIONS:  - Identify barriers to discharge w/patient and caregiver  - Arrange for needed discharge resources and transportation as appropriate  - Identify discharge learning needs (meds, wound care, etc.)  - Arrange for interpretive services to assist at discharge as needed  - Refer to Case Management Department for coordinating discharge planning if the patient needs post-hospital services based on physician/advanced practitioner order or complex needs related to functional status, cognitive ability, or social support system  Outcome: Progressing     Problem: Knowledge Deficit  Goal: Patient/family/caregiver demonstrates understanding of disease process, treatment plan, medications, and discharge instructions  Description: Complete learning assessment and assess knowledge base.  Interventions:  - Provide teaching at level of understanding  - Provide teaching via preferred learning methods  Outcome: Progressing     - Out of bed for toileting  - Record patient progress and toleration of activity level   Outcome: Progressing
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
## 2025-06-29 NOTE — CASE MANAGEMENT
Case Management Progress Note    Patient name Joycelyn Pierce  Location W /W -01 MRN [id number]  : 1945 Date 2025       LOS (days): 1  Geometric Mean LOS (GMLOS) (days):   Days to GMLOS:        OBJECTIVE:        Current admission status: Inpatient  Preferred Pharmacy:   UNKNOWN - FOLLOW UP PRIOR TO DISCHARGE TO E-PRESCRIBE  No address on file      Primary Care Provider: Chaitanya Morgan MD    Primary Insurance: [email or web address]  Secondary Insurance:     PROGRESS NOTE:    CM advised auth is still pending. Provider updated. Cm to follow.

## 2025-06-29 NOTE — PLAN OF CARE
Problem: SAFETY ADULT  Goal: Patient will remain free of falls  Description: INTERVENTIONS:  - Educate patient/family on patient safety including physical limitations  - Instruct patient to call for assistance with activity   - Consider consulting OT/PT to assist with strengthening/mobility based on AM PAC & JH-HLM score  - Consult OT/PT to assist with strengthening/mobility   - Keep Call bell within reach  - Keep bed low and locked with side rails adjusted as appropriate  - Keep care items and personal belongings within reach  - Initiate and maintain comfort rounds  - Make Fall Risk Sign visible to staff  - Offer Toileting every 2 Hours, in advance of need  - Initiate/Maintain bed alarm  - Obtain necessary fall risk management equipment: alarms  - Apply yellow socks and bracelet for high fall risk patients  - Consider moving patient to room near nurses station  Outcome: Progressing  Goal: Maintain or return to baseline ADL function  Description: INTERVENTIONS:  -  Assess patient's ability to carry out ADLs; assess patient's baseline for ADL function and identify physical deficits which impact ability to perform ADLs (bathing, care of mouth/teeth, toileting, grooming, dressing, etc.)  - Assess/evaluate cause of self-care deficits   - Assess range of motion  - Assess patient's mobility; develop plan if impaired  - Assess patient's need for assistive devices and provide as appropriate  - Encourage maximum independence but intervene and supervise when necessary  - Involve family in performance of ADLs  - Assess for home care needs following discharge   - Consider OT consult to assist with ADL evaluation and planning for discharge  - Provide patient education as appropriate  - Monitor functional capacity and physical performance, use of AM PAC & JH-HLM   - Monitor gait, balance and fatigue with ambulation    Outcome: Progressing  Goal: Maintains/Returns to pre admission functional level  Description: INTERVENTIONS:  -  Perform AM-PAC 6 Click Basic Mobility/ Daily Activity assessment daily.  - Set and communicate daily mobility goal to care team and patient/family/caregiver.   - Collaborate with rehabilitation services on mobility goals if consulted  - Perform Range of Motion 4 times a day.  - Reposition patient every 2 hours.  - Dangle patient 4 times a day  - Stand patient 4 times a day  - Ambulate patient 4 times a day  - Out of bed to chair 4 times a day   - Out of bed for meals 3 times a day  - Out of bed for toileting  - Record patient progress and toleration of activity level   Outcome: Progressing     Problem: DISCHARGE PLANNING  Goal: Discharge to home or other facility with appropriate resources  Description: INTERVENTIONS:  - Identify barriers to discharge w/patient and caregiver  - Arrange for needed discharge resources and transportation as appropriate  - Identify discharge learning needs (meds, wound care, etc.)  - Arrange for interpretive services to assist at discharge as needed  - Refer to Case Management Department for coordinating discharge planning if the patient needs post-hospital services based on physician/advanced practitioner order or complex needs related to functional status, cognitive ability, or social support system  Outcome: Progressing      Azelaic Acid Pregnancy And Lactation Text: This medication is considered safe during pregnancy and breast feeding.

## 2025-06-30 VITALS
RESPIRATION RATE: 18 BRPM | HEART RATE: 66 BPM | DIASTOLIC BLOOD PRESSURE: 67 MMHG | TEMPERATURE: 100.2 F | WEIGHT: 121.91 LBS | BODY MASS INDEX: 21.6 KG/M2 | HEIGHT: 63 IN | OXYGEN SATURATION: 98 % | SYSTOLIC BLOOD PRESSURE: 135 MMHG

## 2025-06-30 VITALS
RESPIRATION RATE: 18 BRPM | BODY MASS INDEX: 21.6 KG/M2 | TEMPERATURE: 98.4 F | OXYGEN SATURATION: 98 % | HEIGHT: 63 IN | DIASTOLIC BLOOD PRESSURE: 63 MMHG | SYSTOLIC BLOOD PRESSURE: 127 MMHG | WEIGHT: 121.91 LBS | HEART RATE: 72 BPM

## 2025-06-30 PROBLEM — N18.9 CKD (CHRONIC KIDNEY DISEASE): Status: ACTIVE | Noted: 2025-06-30

## 2025-06-30 PROCEDURE — 97530 THERAPEUTIC ACTIVITIES: CPT

## 2025-06-30 PROCEDURE — NC001 PR NO CHARGE: Performed by: PHYSICIAN ASSISTANT

## 2025-06-30 PROCEDURE — 99232 SBSQ HOSP IP/OBS MODERATE 35: CPT | Performed by: FAMILY MEDICINE

## 2025-06-30 PROCEDURE — 97110 THERAPEUTIC EXERCISES: CPT

## 2025-06-30 PROCEDURE — 99238 HOSP IP/OBS DSCHRG MGMT 30/<: CPT | Performed by: SURGERY

## 2025-06-30 RX ORDER — ACETAMINOPHEN 325 MG/1
975 TABLET ORAL EVERY 8 HOURS SCHEDULED
Start: 2025-06-30

## 2025-06-30 RX ORDER — GABAPENTIN 400 MG/1
400 CAPSULE ORAL 2 TIMES DAILY
Start: 2025-06-30

## 2025-06-30 RX ORDER — BUPRENORPHINE 10 UG/H
10 PATCH TRANSDERMAL
Qty: 2 PATCH | Refills: 0 | Status: SHIPPED | OUTPATIENT
Start: 2025-07-04 | End: 2025-07-18

## 2025-06-30 RX ORDER — DULOXETIN HYDROCHLORIDE 60 MG/1
60 CAPSULE, DELAYED RELEASE ORAL DAILY
Start: 2025-07-01

## 2025-06-30 RX ADMIN — PRAVASTATIN SODIUM 40 MG: 40 TABLET ORAL at 16:39

## 2025-06-30 RX ADMIN — CEFTRIAXONE SODIUM 1000 MG: 10 INJECTION, POWDER, FOR SOLUTION INTRAVENOUS at 11:37

## 2025-06-30 RX ADMIN — GABAPENTIN 400 MG: 400 CAPSULE ORAL at 09:07

## 2025-06-30 RX ADMIN — DULOXETINE 60 MG: 60 CAPSULE, DELAYED RELEASE ORAL at 09:07

## 2025-06-30 RX ADMIN — APIXABAN 2.5 MG: 2.5 TABLET, FILM COATED ORAL at 09:08

## 2025-06-30 RX ADMIN — ACETAMINOPHEN 975 MG: 325 TABLET ORAL at 15:17

## 2025-06-30 RX ADMIN — METOPROLOL SUCCINATE 25 MG: 25 TABLET, EXTENDED RELEASE ORAL at 09:07

## 2025-06-30 RX ADMIN — ACETAMINOPHEN 975 MG: 325 TABLET ORAL at 05:48
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
## 2025-06-30 NOTE — OCCUPATIONAL THERAPY NOTE
Occupational Therapy Note     New OT orders as of 6/28 received, acknowledged. Pt already on OT caseload as of 6/27. Will continue to follow POC as previously established.     Eileen Montoya MS, OTR/L
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
## 2025-06-30 NOTE — PHYSICAL THERAPY NOTE
PHYSICAL THERAPY NOTE          Patient Name: Joycelyn Pierce  Today's Date: 6/30/2025 06/30/25 1432   PT Last Visit   PT Visit Date 06/30/25   Note Type   Note Type Treatment   Pain Assessment   Pain Assessment Tool 0-10   Pain Score No Pain   Patient's Stated Pain Goal No pain   Hospital Pain Intervention(s) Repositioned;Ambulation/increased activity;Rest   Multiple Pain Sites No   Restrictions/Precautions   Weight Bearing Precautions Per Order Yes   RLE Weight Bearing Per Order NWB   Braces or Orthoses   (RLE splint)   Other Precautions Chair Alarm;Cognitive;Bed Alarm;Fall Risk;Pain   General   Chart Reviewed Yes   Response to Previous Treatment Patient with no complaints from previous session.   Family/Caregiver Present No   Cognition   Overall Cognitive Status (S)  Impaired   Arousal/Participation Alert;Responsive;Cooperative   Attention Attends with cues to redirect   Orientation Level Oriented X4   Memory Decreased short term memory;Decreased recall of precautions   Following Commands Follows one step commands with increased time or repetition   Comments pt continues to be pleasant and cooperative throughout todays tx session   Subjective   Subjective pt reports no pain pre/post tx session, was agreeable to participate in PT tx session   Bed Mobility   Sit to Supine 4  Minimal assistance   Additional items Assist x 1;HOB elevated;Bedrails;Increased time required;Verbal cues;LE management   Additional Comments pt was able to sit EOB w/o LOB while participating in TE activities >8 miunutes   Transfers   Sit to Stand 4  Minimal assistance   Additional items Assist x 1;Armrests;Increased time required;Verbal cues   Stand to Sit 3  Moderate assistance   Additional items Assist x 1;Increased time required;Verbal cues   Stand pivot 4  Minimal assistance   Additional items Assist x 1;Armrests;Increased time required;Verbal cues    Additional Comments pt is unable to maintain her NWB precautions with STS transfers but with SPT pt was able to adhere to her NWB precautions with RW   Ambulation/Elevation   Gait pattern Not appropriate;Not tested   Balance   Static Sitting Fair +   Dynamic Sitting Fair -   Static Standing Poor +   Dynamic Standing Poor   Ambulatory Zero   Endurance Deficit   Endurance Deficit Yes   Endurance Deficit Description limited standing tolerance, functional mobility and mabulation distance   Activity Tolerance   Activity Tolerance Patient tolerated treatment well   Nurse Made Aware Spoke to RN   Exercises   Quad Sets Supine;15 reps;AROM;Bilateral   Hip Abduction Sitting;20 reps;AROM;Bilateral   Hip Adduction Sitting;15 reps;AROM;Bilateral  (pillow squeezes)   Knee AROM Short Arc Quad Supine;10 reps;AROM;Bilateral   Knee AROM Long Arc Quad Sitting;15 reps;AROM;Bilateral   Ankle Pumps Sitting;20 reps;AROM;Left   Marching Sitting;15 reps;AROM;Bilateral   Balance training  standing hip abd/add RLE w/ RW UE support x20 reps   Assessment   Problem List Decreased strength;Decreased endurance;Impaired balance;Decreased mobility;Orthopedic restrictions   Assessment pt began tx session seated OOB in the recliner as pt was agreeable to participate in PT tx session. PT to focus on bed mobility, transfer training, maintaining her NWB precautions, TE activities, standing balance/tolerance. In comparison to previous tx sessions pt demonstrated slight progress with functional transfers as pt completed mutiple funcitonal transfers with RW with min Ax1 but pt was unable to adhere to her NWB precautions with STS transfers. While completing a SPT from recliner to the EOB pt was able to adhere to her NWB precautions. pt was able to increase her static/dynamic sitting balance by standing 3 minutes w/ RW while performing TE activities w/o LOB. pt was able to participate in TE activities >10 minutes w/o LOB. pt does continues to be functioning  below her baseline level as pt would benefit from continued skilled PT intervention in order to address pt deficits listed above. pt was able to complete a sit<>supine transfer with LE management and min Ax1. Continue to recommend DC w/ level 2 moderate rehab resource intensity when medically cleared   Barriers to Discharge Inaccessible home environment;Decreased caregiver support   Goals   Patient Goals to get better   STG Expiration Date 07/04/25   PT Treatment Day 3   Plan   Treatment/Interventions Functional transfer training;LE strengthening/ROM;Therapeutic exercise;Endurance training;Patient/family training;Equipment eval/education;Bed mobility;Gait training;Spoke to nursing;Compensatory technique education  (WC training, pt refused WC training in todays tx session stating she will not be using a WC at home)   Progress Slow progress, decreased activity tolerance   PT Frequency 3-5x/wk   Discharge Recommendation   Rehab Resource Intensity Level, PT II (Moderate Resource Intensity)   Equipment Recommended Walker   Walker Package Recommended Wheeled walker   Change/add to Walker Package? No   AM-PAC Basic Mobility Inpatient   Turning in Flat Bed Without Bedrails 3   Lying on Back to Sitting on Edge of Flat Bed Without Bedrails 2   Moving Bed to Chair 3   Standing Up From Chair Using Arms 3   Walk in Room 1   Climb 3-5 Stairs With Railing 1   Basic Mobility Inpatient Raw Score 13   Basic Mobility Standardized Score 33.99   [facility] Highest Level Of Mobility   -HL Goal 4: Move to chair/commode   -[facility] Achieved 4: Move to chair/commode   Education   Education Provided Other  (functional transfers and TE activities.)   Patient Demonstrates acceptance/verbal understanding   End of Consult   Patient Position at End of Consult Supine;Bed/Chair alarm activated;All needs within reach   The patient's AM-PAC Basic Mobility Inpatient Short Form Raw Score is 13. A Raw score of less than or equal to 16 suggests the  patient may benefit from discharge to post-acute rehabilitation services. Please also refer to the recommendation of the Physical Therapist for safe discharge planning.    Zane Rao

## 2025-06-30 NOTE — PLAN OF CARE
Problem: PAIN - ADULT  Goal: Verbalizes/displays adequate comfort level or baseline comfort level  Description: Interventions:  - Encourage patient to monitor pain and request assistance  - Assess pain using appropriate pain scale  - Administer analgesics as ordered based on type and severity of pain and evaluate response  - Implement non-pharmacological measures as appropriate and evaluate response  - Consider cultural and social influences on pain and pain management  - Notify physician/advanced practitioner if interventions unsuccessful or patient reports new pain  - Educate patient/family on pain management process including their role and importance of  reporting pain   - Provide non-pharmacologic/complimentary pain relief interventions  Outcome: Progressing     Problem: INFECTION - ADULT  Goal: Absence or prevention of progression during hospitalization  Description: INTERVENTIONS:  - Assess and monitor for signs and symptoms of infection  - Monitor lab/diagnostic results  - Monitor all insertion sites, i.e. indwelling lines, tubes, and drains  - Monitor endotracheal if appropriate and nasal secretions for changes in amount and color  - [place] appropriate cooling/warming therapies per order  - Administer medications as ordered  - Instruct and encourage patient and family to use good hand hygiene technique  - Identify and instruct in appropriate isolation precautions for identified infection/condition  Outcome: Progressing  Goal: Absence of fever/infection during neutropenic period  Description: INTERVENTIONS:  - Monitor WBC  - Perform strict hand hygiene  - Limit to healthy visitors only  - No plants, dried, fresh or silk flowers with velasco in patient room  Outcome: Progressing     Problem: SAFETY ADULT  Goal: Patient will remain free of falls  Description: INTERVENTIONS:  - Educate patient/family on patient safety including physical limitations  - Instruct patient to call for assistance with activity   -  Consider consulting OT/PT to assist with strengthening/mobility based on AM PAC & JH-HLM score  - Consult OT/PT to assist with strengthening/mobility   - Keep Call bell within reach  - Keep bed low and locked with side rails adjusted as appropriate  - Keep care items and personal belongings within reach  - Initiate and maintain comfort rounds  - Make Fall Risk Sign visible to staff  - Offer Toileting every  Hours, in advance of need  - Initiate/Maintain alarm  - Obtain necessary fall risk management equipment:   - Apply yellow socks and bracelet for high fall risk patients  - Consider moving patient to room near nurses station  Outcome: Progressing  Goal: Maintain or return to baseline ADL function  Description: INTERVENTIONS:  -  Assess patient's ability to carry out ADLs; assess patient's baseline for ADL function and identify physical deficits which impact ability to perform ADLs (bathing, care of mouth/teeth, toileting, grooming, dressing, etc.)  - Assess/evaluate cause of self-care deficits   - Assess range of motion  - Assess patient's mobility; develop plan if impaired  - Assess patient's need for assistive devices and provide as appropriate  - Encourage maximum independence but intervene and supervise when necessary  - Involve family in performance of ADLs  - Assess for home care needs following discharge   - Consider OT consult to assist with ADL evaluation and planning for discharge  - Provide patient education as appropriate  - Monitor functional capacity and physical performance, use of AM PAC & JH-HLM   - Monitor gait, balance and fatigue with ambulation    Outcome: Progressing  Goal: Maintains/Returns to pre admission functional level  Description: INTERVENTIONS:  - Perform AM-PAC 6 Click Basic Mobility/ Daily Activity assessment daily.  - Set and communicate daily mobility goal to care team and patient/family/caregiver.   - Collaborate with rehabilitation services on mobility goals if consulted  -  Perform Range of Motion  times a day.  - Reposition patient every  hours.  - Dangle patient  times a day  - Stand patient  times a day  - Ambulate patient  times a day  - Out of bed to chair  times a day   - Out of bed for meals  times a day  - Out of bed for toileting  - Record patient progress and toleration of activity level   Outcome: Progressing     Problem: DISCHARGE PLANNING  Goal: Discharge to home or other facility with appropriate resources  Description: INTERVENTIONS:  - Identify barriers to discharge w/patient and caregiver  - Arrange for needed discharge resources and transportation as appropriate  - Identify discharge learning needs (meds, wound care, etc.)  - Arrange for interpretive services to assist at discharge as needed  - Refer to Case Management Department for coordinating discharge planning if the patient needs post-hospital services based on physician/advanced practitioner order or complex needs related to functional status, cognitive ability, or social support system  Outcome: Progressing     Problem: Knowledge Deficit  Goal: Patient/family/caregiver demonstrates understanding of disease process, treatment plan, medications, and discharge instructions  Description: Complete learning assessment and assess knowledge base.  Interventions:  - Provide teaching at level of understanding  - Provide teaching via preferred learning methods  Outcome: Progressing     Problem: Prexisting or High Potential for Compromised Skin Integrity  Goal: Skin integrity is maintained or improved  Description: INTERVENTIONS:  - Identify patients at risk for skin breakdown  - Assess and monitor skin integrity including under and around medical devices   - Assess and monitor nutrition and hydration status  - Monitor labs  - Assess for incontinence   - Turn and reposition patient  - Assist with mobility/ambulation  - Relieve pressure over tressa prominences   - Avoid friction and shearing  - Provide appropriate hygiene  as needed including keeping skin clean and dry  - Evaluate need for skin moisturizer/barrier cream  - Collaborate with interdisciplinary team  - Patient/family teaching  - Consider wound care consult    Assess:  - Review Iain scale daily  - Clean and moisturize skin every   - Inspect skin when repositioning, toileting, and assisting with ADLS  - Assess under medical devices such as  every   - Assess extremities for adequate circulation and sensation     Bed Management:  - Have minimal linens on bed & keep smooth, unwrinkled  - Change linens as needed when moist or perspiring  - Avoid sitting or lying in one position for more than  hours while in bed?Keep HOB at degrees   - Toileting:  - Offer bedside commode  - Assess for incontinence every   - Use incontinent care products after each incontinent episode such as     Activity:  - Mobilize patient  times a day  - Encourage activity and walks on unit  - Encourage or provide ROM exercises   - Turn and reposition patient every  Hours  - Use appropriate equipment to lift or move patient in bed  - Instruct/ Assist with weight shifting every  when out of bed in chair  - Consider limitation of chair time  hour intervals    Skin Care:  - Avoid use of baby powder, tape, friction and shearing, hot water or constrictive clothing  - Relieve pressure over bony prominences using   - Do not massage red bony areas    Next Steps:  - Teach patient strategies to minimize risks such as   - Consider consults to  interdisciplinary teams such as   Outcome: Progressing

## 2025-06-30 NOTE — DISCHARGE SUPPORT
Per [facility] [employer] Portal, pt plan not serviced through [facility] [employer] [facility]. Per Av[employer], prior auth still pending at this time. Pt insurance, [employer], not open during the weekend. LORNE notified: Lianne JOYNER
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
## 2025-06-30 NOTE — DISCHARGE INSTR - AVS FIRST PAGE
Discharge Instructions - Orthopedics      Weight Bearing Status:                                           - Patient was placed into a bulky Hoyos splint for stabilization of the calcaneus fracture -  - Non weight bearing right lower extremity 8 to 12 weeks    DVT prophylaxis:  - Home eliquis    Pain:  -  Continue analgesics as directed.    Appt Instructions:   -The patient will follow-up in the office in 3 weeks time for repeat x-ray evaluation of the right calcaneus and at that time we will determine whether the patient will be transition to a cast for further healing or if we will allow her to start in a cam boot.  - If you do not have your appointment, please call the Orthopedic Surgery Clinic at [phone] to schedule an appointment as instructed.  - Otherwise, followup as scheduled.  - Contact the office sooner if you experience any increased numbness/tingling in the extremities.      Miscellaneous:  - Activity as tolerated with assistance.  - Continue PT and OT evaluation and treatment as indicated.
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
## 2025-07-01 NOTE — UTILIZATION REVIEW
NOTIFICATION OF ADMISSION DISCHARGE   This is a Notification of Discharge from [facility]. Please be advised that this patient has been discharge from our facility. Below you will find the admission and discharge date and time including the patient’s disposition.   UTILIZATION REVIEW CONTACT:  Utilization Review Assistants  Network Utilization Review Department  Phone: [phone] x carefully listen to the prompts. All voicemails are confidential.  Email: [email or web address]     ADMISSION INFORMATION  PRESENTATION DATE: 6/28/2025 12:06 AM  OBERVATION ADMISSION DATE: N/A  INPATIENT ADMISSION DATE: 6/28/25 12:06 AM   DISCHARGE DATE: 6/30/2025  5:52 PM   DISPOSITION:Non [facility] SNF/TCU/SNU    Network Utilization Review Department  ATTENTION: Please call with any questions or concerns to [phone] and carefully listen to the prompts so that you are directed to the right person. All voicemails are confidential.   For Discharge needs, contact Care Management DC Support Team at [phone] opt. 2  Send all requests for admission clinical reviews, approved or denied determinations and any other requests to dedicated fax number below belonging to the campus where the patient is receiving treatment. List of dedicated fax numbers for the Facilities:  FACILITY NAME UR FAX NUMBER   ADMISSION DENIALS (Administrative/Medical Necessity) [phone]   DISCHARGE SUPPORT TEAM ([facility]) [phone]   PARENT CHILD HEALTH (Maternity/NICU/Pediatrics) [phone]   [facility] [phone]   [facility] [phone]   [facility] [phone]   [facility] [phone]   [facility] [phone]   [facility] [phone]   [facility] [phone]   [facility] [phone]   [facility]  [facility] [phone]   [facility] [phone]   [facility] [phone]   [facility] [phone]

## 2025-07-07 ENCOUNTER — OFFICE VISIT (OUTPATIENT)
Dept: OBGYN CLINIC | Facility: CLINIC | Age: 80
End: 2025-07-07

## 2025-07-07 ENCOUNTER — APPOINTMENT (OUTPATIENT)
Dept: RADIOLOGY | Facility: AMBULARY SURGERY CENTER | Age: 80
End: 2025-07-07
Attending: STUDENT IN AN ORGANIZED HEALTH CARE EDUCATION/TRAINING PROGRAM
Payer: COMMERCIAL

## 2025-07-07 VITALS — HEIGHT: 63 IN | WEIGHT: 121.91 LBS | BODY MASS INDEX: 21.6 KG/M2

## 2025-07-07 DIAGNOSIS — S92.001A CLOSED NONDISPLACED FRACTURE OF RIGHT CALCANEUS, UNSPECIFIED PORTION OF CALCANEUS, INITIAL ENCOUNTER: ICD-10-CM

## 2025-07-07 DIAGNOSIS — S92.001A CLOSED NONDISPLACED FRACTURE OF RIGHT CALCANEUS, UNSPECIFIED PORTION OF CALCANEUS, INITIAL ENCOUNTER: Primary | ICD-10-CM

## 2025-07-07 PROCEDURE — 99024 POSTOP FOLLOW-UP VISIT: CPT | Performed by: STUDENT IN AN ORGANIZED HEALTH CARE EDUCATION/TRAINING PROGRAM

## 2025-07-07 PROCEDURE — 73650 X-RAY EXAM OF HEEL: CPT
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
## 2025-07-07 NOTE — UTILIZATION REVIEW
NOTIFICATION OF ADMISSION DISCHARGE   This is a Notification of Discharge from [facility]. Please be advised that this patient has been discharge from our facility. Below you will find the admission and discharge date and time including the patient’s disposition.   UTILIZATION REVIEW CONTACT:  Utilization Review Assistants  Network Utilization Review Department  Phone: [phone] x carefully listen to the prompts. All voicemails are confidential.  Email: [email or web address]     ADMISSION INFORMATION  PRESENTATION DATE: 6/23/2025  5:49 PM  OBERVATION ADMISSION DATE: 06/23/2025 2134  INPATIENT ADMISSION DATE: 6/25/25  8:33 AM   DISCHARGE DATE: 6/28/2025 12:01 AM   DISPOSITION:Non [facility] SNF/TCU/SNU    Network Utilization Review Department  ATTENTION: Please call with any questions or concerns to [phone] and carefully listen to the prompts so that you are directed to the right person. All voicemails are confidential.   For Discharge needs, contact Care Management DC Support Team at [phone] opt. [phone]  Send all requests for admission clinical reviews, approved or denied determinations and any other requests to dedicated fax number below belonging to the campus where the patient is receiving treatment. List of dedicated fax numbers for the Facilities:  FACILITY NAME UR FAX NUMBER   ADMISSION DENIALS (Administrative/Medical Necessity) [phone]   DISCHARGE SUPPORT TEAM ([facility]) [phone]   PARENT CHILD HEALTH (Maternity/NICU/Pediatrics) [phone]   [facility] [phone]   [facility] [phone]   [facility] [phone]   [facility] [phone]   [facility] [phone]   [facility] [phone]   [facility] [phone]   [facility]  [phone]   [facility] [phone]   [facility] [phone]   [facility] [phone]   [facility] [phone]
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
## 2025-07-28 PROBLEM — T14.8XXA SUPERFICIAL LACERATION: Status: RESOLVED | Noted: 2025-06-23 | Resolved: 2025-07-28

## 2025-08-12 ENCOUNTER — APPOINTMENT (OUTPATIENT)
Dept: RADIOLOGY | Facility: AMBULARY SURGERY CENTER | Age: 80
End: 2025-08-12
Attending: STUDENT IN AN ORGANIZED HEALTH CARE EDUCATION/TRAINING PROGRAM
Payer: COMMERCIAL

## 2025-08-12 ENCOUNTER — OFFICE VISIT (OUTPATIENT)
Dept: OBGYN CLINIC | Facility: CLINIC | Age: 80
End: 2025-08-12

## 2025-08-13 PROBLEM — S92.001A CLOSED NONDISPLACED FRACTURE OF RIGHT CALCANEUS, UNSPECIFIED PORTION OF CALCANEUS, INITIAL ENCOUNTER: Status: ACTIVE | Noted: 2025-08-13
